# Patient Record
Sex: MALE | Race: WHITE | NOT HISPANIC OR LATINO | Employment: OTHER | URBAN - METROPOLITAN AREA
[De-identification: names, ages, dates, MRNs, and addresses within clinical notes are randomized per-mention and may not be internally consistent; named-entity substitution may affect disease eponyms.]

---

## 2018-08-29 ENCOUNTER — HOSPITAL ENCOUNTER (OUTPATIENT)
Dept: CT IMAGING | Facility: HOSPITAL | Age: 78
Discharge: HOME | End: 2018-08-29
Attending: INTERNAL MEDICINE | Admitting: INTERNAL MEDICINE

## 2018-08-29 ENCOUNTER — TRANSCRIBE ORDERS (OUTPATIENT)
Dept: ADMINISTRATIVE | Facility: HOSPITAL | Age: 78
End: 2018-08-29

## 2018-08-29 DIAGNOSIS — R41.82 ALTERED MENTAL STATUS, UNSPECIFIED ALTERED MENTAL STATUS TYPE: Primary | ICD-10-CM

## 2018-08-29 DIAGNOSIS — R41.82 ALTERED MENTAL STATUS, UNSPECIFIED ALTERED MENTAL STATUS TYPE: ICD-10-CM

## 2018-08-29 PROCEDURE — 70450 CT HEAD/BRAIN W/O DYE: CPT

## 2018-09-19 ENCOUNTER — HOSPITAL ENCOUNTER (INPATIENT)
Facility: HOSPITAL | Age: 78
LOS: 5 days | Discharge: HOME HEALTH | End: 2018-09-24
Attending: EMERGENCY MEDICINE | Admitting: INTERNAL MEDICINE

## 2018-09-19 DIAGNOSIS — G30.8 ALZHEIMER'S DISEASE OF OTHER ONSET WITH BEHAVIORAL DISTURBANCE: ICD-10-CM

## 2018-09-19 DIAGNOSIS — N30.90 CYSTITIS: Primary | ICD-10-CM

## 2018-09-19 DIAGNOSIS — F02.818 ALZHEIMER'S DISEASE OF OTHER ONSET WITH BEHAVIORAL DISTURBANCE: ICD-10-CM

## 2018-09-19 DIAGNOSIS — F03.91 DEMENTIA WITH BEHAVIORAL DISTURBANCE, UNSPECIFIED DEMENTIA TYPE: ICD-10-CM

## 2018-09-19 DIAGNOSIS — R46.89 AGGRESSIVE BEHAVIOR: ICD-10-CM

## 2018-09-19 PROBLEM — E78.5 HYPERLIPIDEMIA: Status: ACTIVE | Noted: 2018-09-19

## 2018-09-19 PROBLEM — N20.0 NEPHROLITHIASIS: Status: ACTIVE | Noted: 2018-09-19

## 2018-09-19 PROBLEM — Z91.14 NONCOMPLIANCE WITH MEDICATION REGIMEN: Status: ACTIVE | Noted: 2018-09-19

## 2018-09-19 PROBLEM — IMO0002 TYPE 2 DIABETES MELLITUS WITH NEUROLOGICAL MANIFESTATIONS, UNCONTROLLED: Status: ACTIVE | Noted: 2018-09-19

## 2018-09-19 PROBLEM — E11.65 POORLY CONTROLLED TYPE 2 DIABETES MELLITUS (HCC): Status: ACTIVE | Noted: 2018-09-19

## 2018-09-19 PROBLEM — G30.9 ALZHEIMER'S DEMENTIA WITH BEHAVIORAL DISTURBANCE (HCC): Status: ACTIVE | Noted: 2018-09-19

## 2018-09-19 PROBLEM — Z86.69 H/O PARTIAL SEIZURES: Status: ACTIVE | Noted: 2018-09-19

## 2018-09-19 PROBLEM — Z85.46 HISTORY OF PROSTATE CANCER: Status: ACTIVE | Noted: 2018-09-19

## 2018-09-19 LAB
ALBUMIN SERPL-MCNC: 4.3 G/DL (ref 3.5–5.2)
ALBUMIN UR-MCNC: 2.3 MG/L
ALBUMIN/GLOB SERPL: 1.4 G/DL
ALP SERPL-CCNC: 108 U/L (ref 39–117)
ALT SERPL W P-5'-P-CCNC: 21 U/L (ref 1–41)
AMPHET+METHAMPHET UR QL: NEGATIVE
ANION GAP SERPL CALCULATED.3IONS-SCNC: 16.3 MMOL/L
AST SERPL-CCNC: 15 U/L (ref 1–40)
BACTERIA UR QL AUTO: ABNORMAL /HPF
BARBITURATES UR QL SCN: NEGATIVE
BASOPHILS # BLD AUTO: 0.03 10*3/MM3 (ref 0–0.2)
BASOPHILS NFR BLD AUTO: 0.4 % (ref 0–1.5)
BENZODIAZ UR QL SCN: NEGATIVE
BILIRUB SERPL-MCNC: 0.5 MG/DL (ref 0.1–1.2)
BILIRUB UR QL STRIP: NEGATIVE
BUN BLD-MCNC: 10 MG/DL (ref 8–23)
BUN/CREAT SERPL: 14.5 (ref 7–25)
CALCIUM SPEC-SCNC: 9.4 MG/DL (ref 8.6–10.5)
CANNABINOIDS SERPL QL: NEGATIVE
CHLORIDE SERPL-SCNC: 98 MMOL/L (ref 98–107)
CLARITY UR: CLEAR
CO2 SERPL-SCNC: 22.7 MMOL/L (ref 22–29)
COCAINE UR QL: NEGATIVE
COLOR UR: YELLOW
CREAT BLD-MCNC: 0.69 MG/DL (ref 0.76–1.27)
CREAT UR-MCNC: 19.4 MG/DL
DEPRECATED RDW RBC AUTO: 41.8 FL (ref 37–54)
EOSINOPHIL # BLD AUTO: 0.07 10*3/MM3 (ref 0–0.7)
EOSINOPHIL NFR BLD AUTO: 0.9 % (ref 0.3–6.2)
ERYTHROCYTE [DISTWIDTH] IN BLOOD BY AUTOMATED COUNT: 12.4 % (ref 11.5–14.5)
ETHANOL BLD-MCNC: <10 MG/DL (ref 0–10)
ETHANOL UR QL: <0.01 %
FOLATE SERPL-MCNC: >20 NG/ML (ref 4.78–24.2)
GFR SERPL CREATININE-BSD FRML MDRD: 111 ML/MIN/1.73
GLOBULIN UR ELPH-MCNC: 3 GM/DL
GLUCOSE BLD-MCNC: 275 MG/DL (ref 65–99)
GLUCOSE BLDC GLUCOMTR-MCNC: 256 MG/DL (ref 70–130)
GLUCOSE BLDC GLUCOMTR-MCNC: 334 MG/DL (ref 70–130)
GLUCOSE UR STRIP-MCNC: ABNORMAL MG/DL
HBA1C MFR BLD: 16.54 % (ref 4.8–5.6)
HCT VFR BLD AUTO: 42.9 % (ref 40.4–52.2)
HGB BLD-MCNC: 14.8 G/DL (ref 13.7–17.6)
HGB UR QL STRIP.AUTO: NEGATIVE
HYALINE CASTS UR QL AUTO: ABNORMAL /LPF
IMM GRANULOCYTES # BLD: 0.03 10*3/MM3 (ref 0–0.03)
IMM GRANULOCYTES NFR BLD: 0.4 % (ref 0–0.5)
KETONES UR QL STRIP: ABNORMAL
LEUKOCYTE ESTERASE UR QL STRIP.AUTO: ABNORMAL
LYMPHOCYTES # BLD AUTO: 1.86 10*3/MM3 (ref 0.9–4.8)
LYMPHOCYTES NFR BLD AUTO: 24.1 % (ref 19.6–45.3)
MCH RBC QN AUTO: 31.7 PG (ref 27–32.7)
MCHC RBC AUTO-ENTMCNC: 34.5 G/DL (ref 32.6–36.4)
MCV RBC AUTO: 91.9 FL (ref 79.8–96.2)
METHADONE UR QL SCN: NEGATIVE
MICROALBUMIN/CREAT UR: 118.6 MG/G
MONOCYTES # BLD AUTO: 0.78 10*3/MM3 (ref 0.2–1.2)
MONOCYTES NFR BLD AUTO: 10.1 % (ref 5–12)
NEUTROPHILS # BLD AUTO: 4.98 10*3/MM3 (ref 1.9–8.1)
NEUTROPHILS NFR BLD AUTO: 64.5 % (ref 42.7–76)
NITRITE UR QL STRIP: NEGATIVE
OPIATES UR QL: NEGATIVE
OXYCODONE UR QL SCN: NEGATIVE
PH UR STRIP.AUTO: 6 [PH] (ref 5–8)
PLATELET # BLD AUTO: 265 10*3/MM3 (ref 140–500)
PMV BLD AUTO: 9.5 FL (ref 6–12)
POTASSIUM BLD-SCNC: 3.8 MMOL/L (ref 3.5–5.2)
PROT SERPL-MCNC: 7.3 G/DL (ref 6–8.5)
PROT UR QL STRIP: NEGATIVE
RBC # BLD AUTO: 4.67 10*6/MM3 (ref 4.6–6)
RBC # UR: ABNORMAL /HPF
REF LAB TEST METHOD: ABNORMAL
SODIUM BLD-SCNC: 137 MMOL/L (ref 136–145)
SP GR UR STRIP: 1.01 (ref 1–1.03)
SQUAMOUS #/AREA URNS HPF: ABNORMAL /HPF
T4 FREE SERPL-MCNC: 1.53 NG/DL (ref 0.93–1.7)
TSH SERPL DL<=0.05 MIU/L-ACNC: 1.05 MIU/ML (ref 0.27–4.2)
UROBILINOGEN UR QL STRIP: ABNORMAL
VIT B12 BLD-MCNC: 1367 PG/ML (ref 211–946)
WBC NRBC COR # BLD: 7.72 10*3/MM3 (ref 4.5–10.7)
WBC UR QL AUTO: ABNORMAL /HPF

## 2018-09-19 PROCEDURE — 84439 ASSAY OF FREE THYROXINE: CPT | Performed by: INTERNAL MEDICINE

## 2018-09-19 PROCEDURE — 80307 DRUG TEST PRSMV CHEM ANLYZR: CPT | Performed by: EMERGENCY MEDICINE

## 2018-09-19 PROCEDURE — 80053 COMPREHEN METABOLIC PANEL: CPT | Performed by: EMERGENCY MEDICINE

## 2018-09-19 PROCEDURE — 90791 PSYCH DIAGNOSTIC EVALUATION: CPT | Performed by: SOCIAL WORKER

## 2018-09-19 PROCEDURE — 87077 CULTURE AEROBIC IDENTIFY: CPT | Performed by: EMERGENCY MEDICINE

## 2018-09-19 PROCEDURE — 99284 EMERGENCY DEPT VISIT MOD MDM: CPT

## 2018-09-19 PROCEDURE — 84443 ASSAY THYROID STIM HORMONE: CPT | Performed by: INTERNAL MEDICINE

## 2018-09-19 PROCEDURE — 83036 HEMOGLOBIN GLYCOSYLATED A1C: CPT | Performed by: INTERNAL MEDICINE

## 2018-09-19 PROCEDURE — 87086 URINE CULTURE/COLONY COUNT: CPT | Performed by: EMERGENCY MEDICINE

## 2018-09-19 PROCEDURE — 87040 BLOOD CULTURE FOR BACTERIA: CPT | Performed by: EMERGENCY MEDICINE

## 2018-09-19 PROCEDURE — 82607 VITAMIN B-12: CPT | Performed by: INTERNAL MEDICINE

## 2018-09-19 PROCEDURE — 81001 URINALYSIS AUTO W/SCOPE: CPT | Performed by: EMERGENCY MEDICINE

## 2018-09-19 PROCEDURE — 63710000001 INSULIN ASPART PER 5 UNITS: Performed by: INTERNAL MEDICINE

## 2018-09-19 PROCEDURE — 99223 1ST HOSP IP/OBS HIGH 75: CPT | Performed by: INTERNAL MEDICINE

## 2018-09-19 PROCEDURE — 87186 SC STD MICRODIL/AGAR DIL: CPT | Performed by: EMERGENCY MEDICINE

## 2018-09-19 PROCEDURE — 82962 GLUCOSE BLOOD TEST: CPT

## 2018-09-19 PROCEDURE — 25010000002 CEFTRIAXONE PER 250 MG: Performed by: EMERGENCY MEDICINE

## 2018-09-19 PROCEDURE — 82043 UR ALBUMIN QUANTITATIVE: CPT | Performed by: INTERNAL MEDICINE

## 2018-09-19 PROCEDURE — 85025 COMPLETE CBC W/AUTO DIFF WBC: CPT | Performed by: EMERGENCY MEDICINE

## 2018-09-19 PROCEDURE — 82746 ASSAY OF FOLIC ACID SERUM: CPT | Performed by: INTERNAL MEDICINE

## 2018-09-19 PROCEDURE — 82570 ASSAY OF URINE CREATININE: CPT | Performed by: INTERNAL MEDICINE

## 2018-09-19 RX ORDER — GLIPIZIDE 10 MG/1
10 TABLET ORAL DAILY
COMMUNITY
End: 2018-09-24 | Stop reason: HOSPADM

## 2018-09-19 RX ORDER — LISINOPRIL 2.5 MG/1
2.5 TABLET ORAL DAILY
Status: DISCONTINUED | OUTPATIENT
Start: 2018-09-19 | End: 2018-09-24 | Stop reason: HOSPADM

## 2018-09-19 RX ORDER — SODIUM CHLORIDE 0.9 % (FLUSH) 0.9 %
1-10 SYRINGE (ML) INJECTION AS NEEDED
Status: DISCONTINUED | OUTPATIENT
Start: 2018-09-19 | End: 2018-09-24 | Stop reason: HOSPADM

## 2018-09-19 RX ORDER — LISINOPRIL 2.5 MG/1
2.5 TABLET ORAL DAILY
COMMUNITY
End: 2019-01-01

## 2018-09-19 RX ORDER — LORAZEPAM 2 MG/ML
0.5 INJECTION INTRAMUSCULAR EVERY 6 HOURS PRN
Status: DISCONTINUED | OUTPATIENT
Start: 2018-09-19 | End: 2018-09-21

## 2018-09-19 RX ORDER — NICOTINE POLACRILEX 4 MG
15 LOZENGE BUCCAL
Status: DISCONTINUED | OUTPATIENT
Start: 2018-09-19 | End: 2018-09-24 | Stop reason: HOSPADM

## 2018-09-19 RX ORDER — CEFTRIAXONE SODIUM 1 G/50ML
1 INJECTION, SOLUTION INTRAVENOUS ONCE
Status: COMPLETED | OUTPATIENT
Start: 2018-09-19 | End: 2018-09-19

## 2018-09-19 RX ORDER — ACETAMINOPHEN 325 MG/1
650 TABLET ORAL EVERY 4 HOURS PRN
Status: DISCONTINUED | OUTPATIENT
Start: 2018-09-19 | End: 2018-09-24 | Stop reason: HOSPADM

## 2018-09-19 RX ORDER — DEXTROSE MONOHYDRATE 25 G/50ML
25 INJECTION, SOLUTION INTRAVENOUS
Status: DISCONTINUED | OUTPATIENT
Start: 2018-09-19 | End: 2018-09-24 | Stop reason: HOSPADM

## 2018-09-19 RX ORDER — SODIUM CHLORIDE 0.9 % (FLUSH) 0.9 %
10 SYRINGE (ML) INJECTION AS NEEDED
Status: DISCONTINUED | OUTPATIENT
Start: 2018-09-19 | End: 2018-09-24 | Stop reason: HOSPADM

## 2018-09-19 RX ORDER — GLIPIZIDE 10 MG/1
10 TABLET ORAL DAILY
Status: DISCONTINUED | OUTPATIENT
Start: 2018-09-19 | End: 2018-09-19

## 2018-09-19 RX ADMIN — METFORMIN HYDROCHLORIDE 1000 MG: 1000 TABLET ORAL at 19:10

## 2018-09-19 RX ADMIN — LINAGLIPTIN 5 MG: 5 TABLET, FILM COATED ORAL at 19:11

## 2018-09-19 RX ADMIN — INSULIN ASPART 5 UNITS: 100 INJECTION, SOLUTION INTRAVENOUS; SUBCUTANEOUS at 17:50

## 2018-09-19 RX ADMIN — CEFTRIAXONE SODIUM 1 G: 1 INJECTION, SOLUTION INTRAVENOUS at 14:44

## 2018-09-19 RX ADMIN — INSULIN ASPART 4 UNITS: 100 INJECTION, SOLUTION INTRAVENOUS; SUBCUTANEOUS at 21:50

## 2018-09-19 RX ADMIN — LISINOPRIL 2.5 MG: 2.5 TABLET ORAL at 19:11

## 2018-09-20 LAB
GLUCOSE BLDC GLUCOMTR-MCNC: 136 MG/DL (ref 70–130)
GLUCOSE BLDC GLUCOMTR-MCNC: 166 MG/DL (ref 70–130)
GLUCOSE BLDC GLUCOMTR-MCNC: 244 MG/DL (ref 70–130)
GLUCOSE BLDC GLUCOMTR-MCNC: 284 MG/DL (ref 70–130)
GLUCOSE BLDC GLUCOMTR-MCNC: 366 MG/DL (ref 70–130)
GLUCOSE BLDC GLUCOMTR-MCNC: 413 MG/DL (ref 70–130)

## 2018-09-20 PROCEDURE — 63710000001 INSULIN DETEMIR PER 5 UNITS: Performed by: INTERNAL MEDICINE

## 2018-09-20 PROCEDURE — 63710000001 INSULIN ASPART PER 5 UNITS: Performed by: INTERNAL MEDICINE

## 2018-09-20 PROCEDURE — 99232 SBSQ HOSP IP/OBS MODERATE 35: CPT | Performed by: INTERNAL MEDICINE

## 2018-09-20 PROCEDURE — 82962 GLUCOSE BLOOD TEST: CPT

## 2018-09-20 PROCEDURE — 25010000002 CEFTRIAXONE PER 250 MG: Performed by: HOSPITALIST

## 2018-09-20 PROCEDURE — 25010000002 LORAZEPAM PER 2 MG: Performed by: INTERNAL MEDICINE

## 2018-09-20 RX ORDER — QUETIAPINE FUMARATE 25 MG/1
25 TABLET, FILM COATED ORAL NIGHTLY
Status: DISCONTINUED | OUTPATIENT
Start: 2018-09-21 | End: 2018-09-24 | Stop reason: HOSPADM

## 2018-09-20 RX ORDER — CEFTRIAXONE SODIUM 1 G/50ML
1 INJECTION, SOLUTION INTRAVENOUS EVERY 24 HOURS
Status: COMPLETED | OUTPATIENT
Start: 2018-09-20 | End: 2018-09-22

## 2018-09-20 RX ADMIN — INSULIN ASPART 5 UNITS: 100 INJECTION, SOLUTION INTRAVENOUS; SUBCUTANEOUS at 13:16

## 2018-09-20 RX ADMIN — INSULIN DETEMIR 20 UNITS: 100 INJECTION, SOLUTION SUBCUTANEOUS at 13:16

## 2018-09-20 RX ADMIN — INSULIN ASPART 3 UNITS: 100 INJECTION, SOLUTION INTRAVENOUS; SUBCUTANEOUS at 08:51

## 2018-09-20 RX ADMIN — LINAGLIPTIN 5 MG: 5 TABLET, FILM COATED ORAL at 08:51

## 2018-09-20 RX ADMIN — LISINOPRIL 2.5 MG: 2.5 TABLET ORAL at 08:51

## 2018-09-20 RX ADMIN — LORAZEPAM 0.5 MG: 2 INJECTION INTRAMUSCULAR; INTRAVENOUS at 00:14

## 2018-09-20 RX ADMIN — INSULIN ASPART 5 UNITS: 100 INJECTION, SOLUTION INTRAVENOUS; SUBCUTANEOUS at 13:18

## 2018-09-20 RX ADMIN — METFORMIN HYDROCHLORIDE 1000 MG: 1000 TABLET ORAL at 08:51

## 2018-09-20 RX ADMIN — LORAZEPAM 0.5 MG: 2 INJECTION INTRAMUSCULAR; INTRAVENOUS at 06:17

## 2018-09-20 RX ADMIN — INSULIN ASPART 5 UNITS: 100 INJECTION, SOLUTION INTRAVENOUS; SUBCUTANEOUS at 18:14

## 2018-09-20 RX ADMIN — CEFTRIAXONE SODIUM 1 G: 1 INJECTION, SOLUTION INTRAVENOUS at 16:20

## 2018-09-20 RX ADMIN — LORAZEPAM 0.5 MG: 2 INJECTION INTRAMUSCULAR; INTRAVENOUS at 21:16

## 2018-09-20 RX ADMIN — INSULIN ASPART 2 UNITS: 100 INJECTION, SOLUTION INTRAVENOUS; SUBCUTANEOUS at 18:14

## 2018-09-21 LAB
BACTERIA SPEC AEROBE CULT: ABNORMAL
GLUCOSE BLDC GLUCOMTR-MCNC: 112 MG/DL (ref 70–130)
GLUCOSE BLDC GLUCOMTR-MCNC: 146 MG/DL (ref 70–130)
GLUCOSE BLDC GLUCOMTR-MCNC: 160 MG/DL (ref 70–130)
GLUCOSE BLDC GLUCOMTR-MCNC: 250 MG/DL (ref 70–130)

## 2018-09-21 PROCEDURE — 25010000002 CEFTRIAXONE PER 250 MG: Performed by: HOSPITALIST

## 2018-09-21 PROCEDURE — 99232 SBSQ HOSP IP/OBS MODERATE 35: CPT | Performed by: INTERNAL MEDICINE

## 2018-09-21 PROCEDURE — 82962 GLUCOSE BLOOD TEST: CPT

## 2018-09-21 PROCEDURE — 63710000001 INSULIN DETEMIR PER 5 UNITS: Performed by: INTERNAL MEDICINE

## 2018-09-21 PROCEDURE — 63710000001 INSULIN ASPART PER 5 UNITS: Performed by: INTERNAL MEDICINE

## 2018-09-21 RX ORDER — OLANZAPINE 10 MG/1
7.5 INJECTION, POWDER, LYOPHILIZED, FOR SOLUTION INTRAMUSCULAR EVERY 6 HOURS PRN
Status: COMPLETED | OUTPATIENT
Start: 2018-09-21 | End: 2018-09-23

## 2018-09-21 RX ADMIN — INSULIN ASPART 4 UNITS: 100 INJECTION, SOLUTION INTRAVENOUS; SUBCUTANEOUS at 21:07

## 2018-09-21 RX ADMIN — QUETIAPINE FUMARATE 25 MG: 25 TABLET ORAL at 21:07

## 2018-09-21 RX ADMIN — INSULIN ASPART 2 UNITS: 100 INJECTION, SOLUTION INTRAVENOUS; SUBCUTANEOUS at 08:05

## 2018-09-21 RX ADMIN — INSULIN DETEMIR 20 UNITS: 100 INJECTION, SOLUTION SUBCUTANEOUS at 07:27

## 2018-09-21 RX ADMIN — INSULIN ASPART 5 UNITS: 100 INJECTION, SOLUTION INTRAVENOUS; SUBCUTANEOUS at 17:20

## 2018-09-21 RX ADMIN — INSULIN ASPART 5 UNITS: 100 INJECTION, SOLUTION INTRAVENOUS; SUBCUTANEOUS at 08:05

## 2018-09-21 RX ADMIN — LISINOPRIL 2.5 MG: 2.5 TABLET ORAL at 08:05

## 2018-09-21 RX ADMIN — QUETIAPINE FUMARATE 25 MG: 25 TABLET ORAL at 00:00

## 2018-09-21 RX ADMIN — CEFTRIAXONE SODIUM 1 G: 1 INJECTION, SOLUTION INTRAVENOUS at 16:14

## 2018-09-22 LAB
GLUCOSE BLDC GLUCOMTR-MCNC: 158 MG/DL (ref 70–130)
GLUCOSE BLDC GLUCOMTR-MCNC: 177 MG/DL (ref 70–130)
GLUCOSE BLDC GLUCOMTR-MCNC: 279 MG/DL (ref 70–130)
GLUCOSE BLDC GLUCOMTR-MCNC: 91 MG/DL (ref 70–130)

## 2018-09-22 PROCEDURE — 63710000001 INSULIN ASPART PER 5 UNITS: Performed by: INTERNAL MEDICINE

## 2018-09-22 PROCEDURE — 82962 GLUCOSE BLOOD TEST: CPT

## 2018-09-22 PROCEDURE — 99232 SBSQ HOSP IP/OBS MODERATE 35: CPT | Performed by: INTERNAL MEDICINE

## 2018-09-22 PROCEDURE — 25010000002 CEFTRIAXONE PER 250 MG: Performed by: HOSPITALIST

## 2018-09-22 PROCEDURE — 63710000001 INSULIN DETEMIR PER 5 UNITS: Performed by: INTERNAL MEDICINE

## 2018-09-22 RX ADMIN — QUETIAPINE FUMARATE 25 MG: 25 TABLET ORAL at 20:57

## 2018-09-22 RX ADMIN — LISINOPRIL 2.5 MG: 2.5 TABLET ORAL at 09:11

## 2018-09-22 RX ADMIN — INSULIN ASPART 4 UNITS: 100 INJECTION, SOLUTION INTRAVENOUS; SUBCUTANEOUS at 12:32

## 2018-09-22 RX ADMIN — INSULIN ASPART 5 UNITS: 100 INJECTION, SOLUTION INTRAVENOUS; SUBCUTANEOUS at 09:12

## 2018-09-22 RX ADMIN — CEFTRIAXONE SODIUM 1 G: 1 INJECTION, SOLUTION INTRAVENOUS at 15:56

## 2018-09-22 RX ADMIN — INSULIN ASPART 2 UNITS: 100 INJECTION, SOLUTION INTRAVENOUS; SUBCUTANEOUS at 09:11

## 2018-09-22 RX ADMIN — INSULIN ASPART 5 UNITS: 100 INJECTION, SOLUTION INTRAVENOUS; SUBCUTANEOUS at 12:31

## 2018-09-22 RX ADMIN — INSULIN DETEMIR 20 UNITS: 100 INJECTION, SOLUTION SUBCUTANEOUS at 09:12

## 2018-09-23 LAB
GLUCOSE BLDC GLUCOMTR-MCNC: 163 MG/DL (ref 70–130)
GLUCOSE BLDC GLUCOMTR-MCNC: 203 MG/DL (ref 70–130)
GLUCOSE BLDC GLUCOMTR-MCNC: 234 MG/DL (ref 70–130)
GLUCOSE BLDC GLUCOMTR-MCNC: 74 MG/DL (ref 70–130)

## 2018-09-23 PROCEDURE — 82962 GLUCOSE BLOOD TEST: CPT

## 2018-09-23 PROCEDURE — 63710000001 INSULIN ASPART PER 5 UNITS: Performed by: INTERNAL MEDICINE

## 2018-09-23 PROCEDURE — 63710000001 INSULIN DETEMIR PER 5 UNITS: Performed by: INTERNAL MEDICINE

## 2018-09-23 PROCEDURE — 99232 SBSQ HOSP IP/OBS MODERATE 35: CPT | Performed by: INTERNAL MEDICINE

## 2018-09-23 RX ADMIN — INSULIN ASPART 5 UNITS: 100 INJECTION, SOLUTION INTRAVENOUS; SUBCUTANEOUS at 12:42

## 2018-09-23 RX ADMIN — QUETIAPINE FUMARATE 25 MG: 25 TABLET ORAL at 20:32

## 2018-09-23 RX ADMIN — INSULIN ASPART 3 UNITS: 100 INJECTION, SOLUTION INTRAVENOUS; SUBCUTANEOUS at 08:53

## 2018-09-23 RX ADMIN — LISINOPRIL 2.5 MG: 2.5 TABLET ORAL at 08:53

## 2018-09-23 RX ADMIN — OLANZAPINE 7.5 MG: 10 INJECTION, POWDER, FOR SOLUTION INTRAMUSCULAR at 23:46

## 2018-09-23 RX ADMIN — INSULIN ASPART 8 UNITS: 100 INJECTION, SOLUTION INTRAVENOUS; SUBCUTANEOUS at 17:30

## 2018-09-23 RX ADMIN — INSULIN ASPART 2 UNITS: 100 INJECTION, SOLUTION INTRAVENOUS; SUBCUTANEOUS at 17:29

## 2018-09-23 RX ADMIN — INSULIN DETEMIR 20 UNITS: 100 INJECTION, SOLUTION SUBCUTANEOUS at 08:53

## 2018-09-23 RX ADMIN — INSULIN ASPART 3 UNITS: 100 INJECTION, SOLUTION INTRAVENOUS; SUBCUTANEOUS at 12:43

## 2018-09-23 RX ADMIN — INSULIN ASPART 5 UNITS: 100 INJECTION, SOLUTION INTRAVENOUS; SUBCUTANEOUS at 08:00

## 2018-09-24 VITALS
SYSTOLIC BLOOD PRESSURE: 127 MMHG | RESPIRATION RATE: 16 BRPM | DIASTOLIC BLOOD PRESSURE: 74 MMHG | BODY MASS INDEX: 20.14 KG/M2 | WEIGHT: 136 LBS | HEIGHT: 69 IN | HEART RATE: 98 BPM | OXYGEN SATURATION: 99 % | TEMPERATURE: 97.4 F

## 2018-09-24 PROBLEM — R80.9 PROTEINURIA: Status: ACTIVE | Noted: 2018-09-24

## 2018-09-24 LAB
BACTERIA SPEC AEROBE CULT: NORMAL
BACTERIA SPEC AEROBE CULT: NORMAL
GLUCOSE BLDC GLUCOMTR-MCNC: 106 MG/DL (ref 70–130)
GLUCOSE BLDC GLUCOMTR-MCNC: 152 MG/DL (ref 70–130)
GLUCOSE BLDC GLUCOMTR-MCNC: 164 MG/DL (ref 70–130)

## 2018-09-24 PROCEDURE — 82962 GLUCOSE BLOOD TEST: CPT

## 2018-09-24 PROCEDURE — 63710000001 INSULIN ASPART PER 5 UNITS: Performed by: INTERNAL MEDICINE

## 2018-09-24 PROCEDURE — 99232 SBSQ HOSP IP/OBS MODERATE 35: CPT | Performed by: INTERNAL MEDICINE

## 2018-09-24 PROCEDURE — 63710000001 INSULIN DETEMIR PER 5 UNITS: Performed by: INTERNAL MEDICINE

## 2018-09-24 RX ORDER — QUETIAPINE FUMARATE 25 MG/1
25 TABLET, FILM COATED ORAL NIGHTLY
Qty: 30 TABLET | Refills: 0 | Status: SHIPPED | OUTPATIENT
Start: 2018-09-24 | End: 2018-10-24

## 2018-09-24 RX ADMIN — LISINOPRIL 2.5 MG: 2.5 TABLET ORAL at 08:25

## 2018-09-24 RX ADMIN — INSULIN ASPART 8 UNITS: 100 INJECTION, SOLUTION INTRAVENOUS; SUBCUTANEOUS at 08:23

## 2018-09-24 RX ADMIN — INSULIN ASPART 8 UNITS: 100 INJECTION, SOLUTION INTRAVENOUS; SUBCUTANEOUS at 13:19

## 2018-09-24 RX ADMIN — INSULIN DETEMIR 25 UNITS: 100 INJECTION, SOLUTION SUBCUTANEOUS at 08:23

## 2018-09-24 RX ADMIN — INSULIN ASPART 2 UNITS: 100 INJECTION, SOLUTION INTRAVENOUS; SUBCUTANEOUS at 13:20

## 2018-09-25 ENCOUNTER — READMISSION MANAGEMENT (OUTPATIENT)
Dept: CALL CENTER | Facility: HOSPITAL | Age: 78
End: 2018-09-25

## 2018-09-25 NOTE — OUTREACH NOTE
Prep Survey      Responses   Facility patient discharged from?  [facility]   Is patient eligible?  Yes   Discharge diagnosis  **Cystitis    Does the patient have one of the following disease processes/diagnoses(primary or secondary)?  Other   Does the patient have Home health ordered?  Yes   What is the Home health agency?   [facility]   Is there a DME ordered?  No   Prep survey completed?  Yes          Christen Powers RN

## 2018-09-27 ENCOUNTER — READMISSION MANAGEMENT (OUTPATIENT)
Dept: CALL CENTER | Facility: HOSPITAL | Age: 78
End: 2018-09-27

## 2018-10-01 ENCOUNTER — READMISSION MANAGEMENT (OUTPATIENT)
Dept: CALL CENTER | Facility: HOSPITAL | Age: 78
End: 2018-10-01

## 2018-10-01 NOTE — OUTREACH NOTE
Medical Week 1 Survey      Responses   Facility patient discharged from?  [facility]   Does the patient have one of the following disease processes/diagnoses(primary or secondary)?  Other   Is there a successful TCM telephone encounter documented?  No   Week 1 attempt successful?  No   Unsuccessful attempts  Attempt 2   Revoke  Phone number issues          Liya Betancourt, RN

## 2019-01-01 ENCOUNTER — APPOINTMENT (OUTPATIENT)
Dept: GENERAL RADIOLOGY | Facility: HOSPITAL | Age: 79
End: 2019-01-01

## 2019-01-01 ENCOUNTER — HOSPITAL ENCOUNTER (INPATIENT)
Facility: HOSPITAL | Age: 79
LOS: 13 days | Discharge: NURSING, REHAB OR LONG-TERM CARE | End: 2019-12-14
Attending: EMERGENCY MEDICINE | Admitting: INTERNAL MEDICINE

## 2019-01-01 ENCOUNTER — HOSPITAL ENCOUNTER (INPATIENT)
Facility: HOSPITAL | Age: 79
LOS: 65 days | End: 2020-02-28
Attending: SPECIALIST | Admitting: SPECIALIST

## 2019-01-01 ENCOUNTER — HOSPITAL ENCOUNTER (OUTPATIENT)
Facility: HOSPITAL | Age: 79
Setting detail: OBSERVATION
End: 2019-12-25
Attending: EMERGENCY MEDICINE | Admitting: INTERNAL MEDICINE

## 2019-01-01 VITALS
OXYGEN SATURATION: 97 % | RESPIRATION RATE: 18 BRPM | HEIGHT: 72 IN | BODY MASS INDEX: 22.28 KG/M2 | WEIGHT: 164.46 LBS | HEART RATE: 76 BPM | DIASTOLIC BLOOD PRESSURE: 77 MMHG | SYSTOLIC BLOOD PRESSURE: 123 MMHG | TEMPERATURE: 99.2 F

## 2019-01-01 VITALS
WEIGHT: 132.28 LBS | OXYGEN SATURATION: 98 % | RESPIRATION RATE: 16 BRPM | HEIGHT: 72 IN | SYSTOLIC BLOOD PRESSURE: 127 MMHG | BODY MASS INDEX: 17.92 KG/M2 | HEART RATE: 65 BPM | TEMPERATURE: 97.2 F | DIASTOLIC BLOOD PRESSURE: 69 MMHG

## 2019-01-01 DIAGNOSIS — F02.81 ALZHEIMER'S DEMENTIA WITH BEHAVIORAL DISTURBANCE, UNSPECIFIED TIMING OF DEMENTIA ONSET: ICD-10-CM

## 2019-01-01 DIAGNOSIS — F03.91 DEMENTIA WITH BEHAVIORAL DISTURBANCE, UNSPECIFIED DEMENTIA TYPE: Primary | ICD-10-CM

## 2019-01-01 DIAGNOSIS — A41.9 SEPSIS WITHOUT ACUTE ORGAN DYSFUNCTION, DUE TO UNSPECIFIED ORGANISM (HCC): ICD-10-CM

## 2019-01-01 DIAGNOSIS — R22.2 MASS OF LEFT CHEST WALL: ICD-10-CM

## 2019-01-01 DIAGNOSIS — N39.0 ACUTE UTI (URINARY TRACT INFECTION): ICD-10-CM

## 2019-01-01 DIAGNOSIS — J18.9 PNEUMONIA OF RIGHT LOWER LOBE DUE TO INFECTIOUS ORGANISM: Primary | ICD-10-CM

## 2019-01-01 DIAGNOSIS — Z74.09 IMPAIRED MOBILITY: Primary | ICD-10-CM

## 2019-01-01 DIAGNOSIS — F05 DELIRIUM DUE TO ANOTHER MEDICAL CONDITION: ICD-10-CM

## 2019-01-01 DIAGNOSIS — G30.9 ALZHEIMER'S DEMENTIA WITH BEHAVIORAL DISTURBANCE, UNSPECIFIED TIMING OF DEMENTIA ONSET: ICD-10-CM

## 2019-01-01 LAB
ALBUMIN SERPL-MCNC: 3.1 G/DL (ref 3.5–5.2)
ALBUMIN SERPL-MCNC: 3.2 G/DL (ref 3.5–5.2)
ALBUMIN SERPL-MCNC: 3.5 G/DL (ref 3.5–5.2)
ALBUMIN/GLOB SERPL: 0.8 G/DL
ALBUMIN/GLOB SERPL: 0.9 G/DL
ALBUMIN/GLOB SERPL: 0.9 G/DL
ALP SERPL-CCNC: 124 U/L (ref 39–117)
ALP SERPL-CCNC: 140 U/L (ref 39–117)
ALP SERPL-CCNC: 86 U/L (ref 39–117)
ALT SERPL W P-5'-P-CCNC: 10 U/L (ref 1–41)
ALT SERPL W P-5'-P-CCNC: 13 U/L (ref 1–41)
ALT SERPL W P-5'-P-CCNC: 18 U/L (ref 1–41)
ANION GAP SERPL CALCULATED.3IONS-SCNC: 10 MMOL/L (ref 5–15)
ANION GAP SERPL CALCULATED.3IONS-SCNC: 10 MMOL/L (ref 5–15)
ANION GAP SERPL CALCULATED.3IONS-SCNC: 10.8 MMOL/L (ref 5–15)
ANION GAP SERPL CALCULATED.3IONS-SCNC: 11 MMOL/L (ref 5–15)
ANION GAP SERPL CALCULATED.3IONS-SCNC: 11.1 MMOL/L (ref 5–15)
ANION GAP SERPL CALCULATED.3IONS-SCNC: 12.2 MMOL/L (ref 5–15)
ANION GAP SERPL CALCULATED.3IONS-SCNC: 12.6 MMOL/L (ref 5–15)
ANION GAP SERPL CALCULATED.3IONS-SCNC: 8 MMOL/L (ref 5–15)
ANION GAP SERPL CALCULATED.3IONS-SCNC: 9 MMOL/L (ref 5–15)
AST SERPL-CCNC: 14 U/L (ref 1–40)
AST SERPL-CCNC: 19 U/L (ref 1–40)
AST SERPL-CCNC: 21 U/L (ref 1–40)
B PERT DNA SPEC QL NAA+PROBE: NOT DETECTED
BACTERIA SPEC AEROBE CULT: ABNORMAL
BACTERIA SPEC AEROBE CULT: NORMAL
BACTERIA UR QL AUTO: ABNORMAL /HPF
BACTERIA UR QL AUTO: ABNORMAL /HPF
BASOPHILS # BLD AUTO: 0 10*3/MM3 (ref 0–0.2)
BASOPHILS # BLD AUTO: 0.06 10*3/MM3 (ref 0–0.2)
BASOPHILS # BLD AUTO: 0.1 10*3/MM3 (ref 0–0.2)
BASOPHILS # BLD AUTO: 0.1 10*3/MM3 (ref 0–0.2)
BASOPHILS NFR BLD AUTO: 0.3 % (ref 0–1.5)
BASOPHILS NFR BLD AUTO: 0.4 % (ref 0–1.5)
BASOPHILS NFR BLD AUTO: 0.5 % (ref 0–1.5)
BASOPHILS NFR BLD AUTO: 0.7 % (ref 0–1.5)
BILIRUB SERPL-MCNC: 0.2 MG/DL (ref 0.2–1.2)
BILIRUB UR QL STRIP: NEGATIVE
BILIRUB UR QL STRIP: NEGATIVE
BUN BLD-MCNC: 10 MG/DL (ref 8–23)
BUN BLD-MCNC: 11 MG/DL (ref 8–23)
BUN BLD-MCNC: 12 MG/DL (ref 8–23)
BUN BLD-MCNC: 14 MG/DL (ref 8–23)
BUN BLD-MCNC: 15 MG/DL (ref 8–23)
BUN BLD-MCNC: 16 MG/DL (ref 8–23)
BUN BLD-MCNC: 17 MG/DL (ref 8–23)
BUN BLD-MCNC: 18 MG/DL (ref 8–23)
BUN BLD-MCNC: 18 MG/DL (ref 8–23)
BUN/CREAT SERPL: 14.7 (ref 7–25)
BUN/CREAT SERPL: 17 (ref 7–25)
BUN/CREAT SERPL: 17.1 (ref 7–25)
BUN/CREAT SERPL: 17.7 (ref 7–25)
BUN/CREAT SERPL: 18.8 (ref 7–25)
BUN/CREAT SERPL: 18.9 (ref 7–25)
BUN/CREAT SERPL: 20.2 (ref 7–25)
BUN/CREAT SERPL: 20.3 (ref 7–25)
BUN/CREAT SERPL: 21.1 (ref 7–25)
BUN/CREAT SERPL: 21.2 (ref 7–25)
BUN/CREAT SERPL: 21.4 (ref 7–25)
C PNEUM DNA NPH QL NAA+NON-PROBE: NOT DETECTED
CALCIUM SPEC-SCNC: 7.8 MG/DL (ref 8.6–10.5)
CALCIUM SPEC-SCNC: 8.1 MG/DL (ref 8.6–10.5)
CALCIUM SPEC-SCNC: 8.2 MG/DL (ref 8.6–10.5)
CALCIUM SPEC-SCNC: 8.4 MG/DL (ref 8.6–10.5)
CALCIUM SPEC-SCNC: 8.4 MG/DL (ref 8.6–10.5)
CALCIUM SPEC-SCNC: 8.5 MG/DL (ref 8.6–10.5)
CALCIUM SPEC-SCNC: 8.5 MG/DL (ref 8.6–10.5)
CALCIUM SPEC-SCNC: 8.6 MG/DL (ref 8.6–10.5)
CALCIUM SPEC-SCNC: 8.7 MG/DL (ref 8.6–10.5)
CALCIUM SPEC-SCNC: 9 MG/DL (ref 8.6–10.5)
CALCIUM SPEC-SCNC: 9.2 MG/DL (ref 8.6–10.5)
CHLORIDE SERPL-SCNC: 100 MMOL/L (ref 98–107)
CHLORIDE SERPL-SCNC: 101 MMOL/L (ref 98–107)
CHLORIDE SERPL-SCNC: 102 MMOL/L (ref 98–107)
CHLORIDE SERPL-SCNC: 103 MMOL/L (ref 98–107)
CHLORIDE SERPL-SCNC: 97 MMOL/L (ref 98–107)
CHLORIDE SERPL-SCNC: 98 MMOL/L (ref 98–107)
CHOLEST SERPL-MCNC: 143 MG/DL (ref 0–200)
CLARITY UR: ABNORMAL
CLARITY UR: CLEAR
CO2 SERPL-SCNC: 24.4 MMOL/L (ref 22–29)
CO2 SERPL-SCNC: 27 MMOL/L (ref 22–29)
CO2 SERPL-SCNC: 27.2 MMOL/L (ref 22–29)
CO2 SERPL-SCNC: 27.8 MMOL/L (ref 22–29)
CO2 SERPL-SCNC: 27.9 MMOL/L (ref 22–29)
CO2 SERPL-SCNC: 28 MMOL/L (ref 22–29)
CO2 SERPL-SCNC: 28 MMOL/L (ref 22–29)
CO2 SERPL-SCNC: 29 MMOL/L (ref 22–29)
CO2 SERPL-SCNC: 30 MMOL/L (ref 22–29)
COLOR UR: ABNORMAL
COLOR UR: YELLOW
CREAT BLD-MCNC: 0.62 MG/DL (ref 0.76–1.27)
CREAT BLD-MCNC: 0.68 MG/DL (ref 0.76–1.27)
CREAT BLD-MCNC: 0.7 MG/DL (ref 0.76–1.27)
CREAT BLD-MCNC: 0.7 MG/DL (ref 0.76–1.27)
CREAT BLD-MCNC: 0.74 MG/DL (ref 0.76–1.27)
CREAT BLD-MCNC: 0.74 MG/DL (ref 0.76–1.27)
CREAT BLD-MCNC: 0.76 MG/DL (ref 0.76–1.27)
CREAT BLD-MCNC: 0.84 MG/DL (ref 0.76–1.27)
CREAT BLD-MCNC: 0.85 MG/DL (ref 0.76–1.27)
CREAT BLD-MCNC: 0.88 MG/DL (ref 0.76–1.27)
CREAT BLD-MCNC: 0.96 MG/DL (ref 0.76–1.27)
D-LACTATE SERPL-SCNC: 0.8 MMOL/L (ref 0.5–2)
D-LACTATE SERPL-SCNC: 1.1 MMOL/L (ref 0.5–2)
D-LACTATE SERPL-SCNC: 2 MMOL/L (ref 0.5–2)
D-LACTATE SERPL-SCNC: 2.2 MMOL/L (ref 0.5–2)
D-LACTATE SERPL-SCNC: 2.3 MMOL/L (ref 0.5–2)
DEPRECATED RDW RBC AUTO: 44.9 FL (ref 37–54)
DEPRECATED RDW RBC AUTO: 45.5 FL (ref 37–54)
DEPRECATED RDW RBC AUTO: 45.8 FL (ref 37–54)
DEPRECATED RDW RBC AUTO: 46.3 FL (ref 37–54)
DEPRECATED RDW RBC AUTO: 50.8 FL (ref 37–54)
DEPRECATED RDW RBC AUTO: 51.2 FL (ref 37–54)
DEPRECATED RDW RBC AUTO: 51.2 FL (ref 37–54)
DEPRECATED RDW RBC AUTO: 52.1 FL (ref 37–54)
DEPRECATED RDW RBC AUTO: 52.5 FL (ref 37–54)
DEPRECATED RDW RBC AUTO: 52.5 FL (ref 37–54)
DEPRECATED RDW RBC AUTO: 52.9 FL (ref 37–54)
DEPRECATED RDW RBC AUTO: 53.4 FL (ref 37–54)
EOSINOPHIL # BLD AUTO: 0 10*3/MM3 (ref 0–0.4)
EOSINOPHIL # BLD AUTO: 0.1 10*3/MM3 (ref 0–0.4)
EOSINOPHIL # BLD AUTO: 0.15 10*3/MM3 (ref 0–0.4)
EOSINOPHIL # BLD AUTO: 0.3 10*3/MM3 (ref 0–0.4)
EOSINOPHIL NFR BLD AUTO: 0.2 % (ref 0.3–6.2)
EOSINOPHIL NFR BLD AUTO: 0.2 % (ref 0.3–6.2)
EOSINOPHIL NFR BLD AUTO: 0.3 % (ref 0.3–6.2)
EOSINOPHIL NFR BLD AUTO: 0.3 % (ref 0.3–6.2)
EOSINOPHIL NFR BLD AUTO: 0.7 % (ref 0.3–6.2)
EOSINOPHIL NFR BLD AUTO: 1.2 % (ref 0.3–6.2)
EOSINOPHIL NFR BLD AUTO: 1.4 % (ref 0.3–6.2)
EOSINOPHIL NFR BLD AUTO: 2.3 % (ref 0.3–6.2)
EOSINOPHIL NFR BLD AUTO: 3.4 % (ref 0.3–6.2)
ERYTHROCYTE [DISTWIDTH] IN BLOOD BY AUTOMATED COUNT: 13.5 % (ref 12.3–15.4)
ERYTHROCYTE [DISTWIDTH] IN BLOOD BY AUTOMATED COUNT: 13.9 % (ref 12.3–15.4)
ERYTHROCYTE [DISTWIDTH] IN BLOOD BY AUTOMATED COUNT: 14 % (ref 12.3–15.4)
ERYTHROCYTE [DISTWIDTH] IN BLOOD BY AUTOMATED COUNT: 14 % (ref 12.3–15.4)
ERYTHROCYTE [DISTWIDTH] IN BLOOD BY AUTOMATED COUNT: 15.4 % (ref 12.3–15.4)
ERYTHROCYTE [DISTWIDTH] IN BLOOD BY AUTOMATED COUNT: 15.5 % (ref 12.3–15.4)
ERYTHROCYTE [DISTWIDTH] IN BLOOD BY AUTOMATED COUNT: 15.6 % (ref 12.3–15.4)
ERYTHROCYTE [DISTWIDTH] IN BLOOD BY AUTOMATED COUNT: 15.7 % (ref 12.3–15.4)
ERYTHROCYTE [DISTWIDTH] IN BLOOD BY AUTOMATED COUNT: 15.8 % (ref 12.3–15.4)
ERYTHROCYTE [DISTWIDTH] IN BLOOD BY AUTOMATED COUNT: 15.9 % (ref 12.3–15.4)
ERYTHROCYTE [DISTWIDTH] IN BLOOD BY AUTOMATED COUNT: 16.1 % (ref 12.3–15.4)
ERYTHROCYTE [DISTWIDTH] IN BLOOD BY AUTOMATED COUNT: 16.1 % (ref 12.3–15.4)
FLUAV H1 2009 PAND RNA NPH QL NAA+PROBE: NOT DETECTED
FLUAV H1 HA GENE NPH QL NAA+PROBE: NOT DETECTED
FLUAV H3 RNA NPH QL NAA+PROBE: NOT DETECTED
FLUAV SUBTYP SPEC NAA+PROBE: NOT DETECTED
FLUBV RNA ISLT QL NAA+PROBE: NOT DETECTED
GFR SERPL CREATININE-BSD FRML MDRD: 102 ML/MIN/1.73
GFR SERPL CREATININE-BSD FRML MDRD: 102 ML/MIN/1.73
GFR SERPL CREATININE-BSD FRML MDRD: 109 ML/MIN/1.73
GFR SERPL CREATININE-BSD FRML MDRD: 109 ML/MIN/1.73
GFR SERPL CREATININE-BSD FRML MDRD: 112 ML/MIN/1.73
GFR SERPL CREATININE-BSD FRML MDRD: 125 ML/MIN/1.73
GFR SERPL CREATININE-BSD FRML MDRD: 75 ML/MIN/1.73
GFR SERPL CREATININE-BSD FRML MDRD: 83 ML/MIN/1.73
GFR SERPL CREATININE-BSD FRML MDRD: 87 ML/MIN/1.73
GFR SERPL CREATININE-BSD FRML MDRD: 88 ML/MIN/1.73
GFR SERPL CREATININE-BSD FRML MDRD: 99 ML/MIN/1.73
GLOBULIN UR ELPH-MCNC: 3.5 GM/DL
GLOBULIN UR ELPH-MCNC: 3.7 GM/DL
GLOBULIN UR ELPH-MCNC: 4 GM/DL
GLUCOSE BLD-MCNC: 115 MG/DL (ref 65–99)
GLUCOSE BLD-MCNC: 126 MG/DL (ref 65–99)
GLUCOSE BLD-MCNC: 130 MG/DL (ref 65–99)
GLUCOSE BLD-MCNC: 150 MG/DL (ref 65–99)
GLUCOSE BLD-MCNC: 221 MG/DL (ref 65–99)
GLUCOSE BLD-MCNC: 230 MG/DL (ref 65–99)
GLUCOSE BLD-MCNC: 272 MG/DL (ref 65–99)
GLUCOSE BLD-MCNC: 394 MG/DL (ref 65–99)
GLUCOSE BLD-MCNC: 47 MG/DL (ref 65–99)
GLUCOSE BLD-MCNC: 55 MG/DL (ref 65–99)
GLUCOSE BLD-MCNC: 82 MG/DL (ref 65–99)
GLUCOSE BLDC GLUCOMTR-MCNC: 102 MG/DL (ref 70–105)
GLUCOSE BLDC GLUCOMTR-MCNC: 103 MG/DL (ref 70–105)
GLUCOSE BLDC GLUCOMTR-MCNC: 103 MG/DL (ref 70–130)
GLUCOSE BLDC GLUCOMTR-MCNC: 105 MG/DL (ref 70–105)
GLUCOSE BLDC GLUCOMTR-MCNC: 106 MG/DL (ref 70–105)
GLUCOSE BLDC GLUCOMTR-MCNC: 108 MG/DL (ref 70–130)
GLUCOSE BLDC GLUCOMTR-MCNC: 110 MG/DL (ref 70–130)
GLUCOSE BLDC GLUCOMTR-MCNC: 110 MG/DL (ref 70–130)
GLUCOSE BLDC GLUCOMTR-MCNC: 111 MG/DL (ref 70–105)
GLUCOSE BLDC GLUCOMTR-MCNC: 111 MG/DL (ref 70–130)
GLUCOSE BLDC GLUCOMTR-MCNC: 113 MG/DL (ref 70–130)
GLUCOSE BLDC GLUCOMTR-MCNC: 115 MG/DL (ref 70–105)
GLUCOSE BLDC GLUCOMTR-MCNC: 116 MG/DL (ref 70–130)
GLUCOSE BLDC GLUCOMTR-MCNC: 118 MG/DL (ref 70–130)
GLUCOSE BLDC GLUCOMTR-MCNC: 120 MG/DL (ref 70–105)
GLUCOSE BLDC GLUCOMTR-MCNC: 120 MG/DL (ref 70–105)
GLUCOSE BLDC GLUCOMTR-MCNC: 121 MG/DL (ref 70–130)
GLUCOSE BLDC GLUCOMTR-MCNC: 123 MG/DL (ref 70–130)
GLUCOSE BLDC GLUCOMTR-MCNC: 124 MG/DL (ref 70–105)
GLUCOSE BLDC GLUCOMTR-MCNC: 125 MG/DL (ref 70–105)
GLUCOSE BLDC GLUCOMTR-MCNC: 127 MG/DL (ref 70–130)
GLUCOSE BLDC GLUCOMTR-MCNC: 128 MG/DL (ref 70–105)
GLUCOSE BLDC GLUCOMTR-MCNC: 133 MG/DL (ref 70–105)
GLUCOSE BLDC GLUCOMTR-MCNC: 133 MG/DL (ref 70–105)
GLUCOSE BLDC GLUCOMTR-MCNC: 134 MG/DL (ref 70–105)
GLUCOSE BLDC GLUCOMTR-MCNC: 136 MG/DL (ref 70–130)
GLUCOSE BLDC GLUCOMTR-MCNC: 137 MG/DL (ref 70–130)
GLUCOSE BLDC GLUCOMTR-MCNC: 139 MG/DL (ref 70–130)
GLUCOSE BLDC GLUCOMTR-MCNC: 139 MG/DL (ref 70–130)
GLUCOSE BLDC GLUCOMTR-MCNC: 141 MG/DL (ref 70–130)
GLUCOSE BLDC GLUCOMTR-MCNC: 142 MG/DL (ref 70–130)
GLUCOSE BLDC GLUCOMTR-MCNC: 143 MG/DL (ref 70–105)
GLUCOSE BLDC GLUCOMTR-MCNC: 143 MG/DL (ref 70–130)
GLUCOSE BLDC GLUCOMTR-MCNC: 145 MG/DL (ref 70–105)
GLUCOSE BLDC GLUCOMTR-MCNC: 150 MG/DL (ref 70–105)
GLUCOSE BLDC GLUCOMTR-MCNC: 152 MG/DL (ref 70–105)
GLUCOSE BLDC GLUCOMTR-MCNC: 155 MG/DL (ref 70–105)
GLUCOSE BLDC GLUCOMTR-MCNC: 157 MG/DL (ref 70–105)
GLUCOSE BLDC GLUCOMTR-MCNC: 158 MG/DL (ref 70–130)
GLUCOSE BLDC GLUCOMTR-MCNC: 159 MG/DL (ref 70–130)
GLUCOSE BLDC GLUCOMTR-MCNC: 166 MG/DL (ref 70–105)
GLUCOSE BLDC GLUCOMTR-MCNC: 166 MG/DL (ref 70–105)
GLUCOSE BLDC GLUCOMTR-MCNC: 171 MG/DL (ref 70–105)
GLUCOSE BLDC GLUCOMTR-MCNC: 173 MG/DL (ref 70–105)
GLUCOSE BLDC GLUCOMTR-MCNC: 173 MG/DL (ref 70–130)
GLUCOSE BLDC GLUCOMTR-MCNC: 178 MG/DL (ref 70–105)
GLUCOSE BLDC GLUCOMTR-MCNC: 178 MG/DL (ref 70–105)
GLUCOSE BLDC GLUCOMTR-MCNC: 181 MG/DL (ref 70–130)
GLUCOSE BLDC GLUCOMTR-MCNC: 183 MG/DL (ref 70–105)
GLUCOSE BLDC GLUCOMTR-MCNC: 183 MG/DL (ref 70–105)
GLUCOSE BLDC GLUCOMTR-MCNC: 183 MG/DL (ref 70–130)
GLUCOSE BLDC GLUCOMTR-MCNC: 185 MG/DL (ref 70–105)
GLUCOSE BLDC GLUCOMTR-MCNC: 185 MG/DL (ref 70–130)
GLUCOSE BLDC GLUCOMTR-MCNC: 193 MG/DL (ref 70–105)
GLUCOSE BLDC GLUCOMTR-MCNC: 198 MG/DL (ref 70–105)
GLUCOSE BLDC GLUCOMTR-MCNC: 201 MG/DL (ref 70–130)
GLUCOSE BLDC GLUCOMTR-MCNC: 202 MG/DL (ref 70–130)
GLUCOSE BLDC GLUCOMTR-MCNC: 203 MG/DL (ref 70–105)
GLUCOSE BLDC GLUCOMTR-MCNC: 212 MG/DL (ref 70–130)
GLUCOSE BLDC GLUCOMTR-MCNC: 212 MG/DL (ref 70–130)
GLUCOSE BLDC GLUCOMTR-MCNC: 214 MG/DL (ref 70–105)
GLUCOSE BLDC GLUCOMTR-MCNC: 216 MG/DL (ref 70–105)
GLUCOSE BLDC GLUCOMTR-MCNC: 218 MG/DL (ref 70–105)
GLUCOSE BLDC GLUCOMTR-MCNC: 222 MG/DL (ref 70–105)
GLUCOSE BLDC GLUCOMTR-MCNC: 223 MG/DL (ref 70–105)
GLUCOSE BLDC GLUCOMTR-MCNC: 225 MG/DL (ref 70–105)
GLUCOSE BLDC GLUCOMTR-MCNC: 232 MG/DL (ref 70–105)
GLUCOSE BLDC GLUCOMTR-MCNC: 235 MG/DL (ref 70–130)
GLUCOSE BLDC GLUCOMTR-MCNC: 236 MG/DL (ref 70–130)
GLUCOSE BLDC GLUCOMTR-MCNC: 236 MG/DL (ref 70–130)
GLUCOSE BLDC GLUCOMTR-MCNC: 238 MG/DL (ref 70–105)
GLUCOSE BLDC GLUCOMTR-MCNC: 240 MG/DL (ref 70–130)
GLUCOSE BLDC GLUCOMTR-MCNC: 243 MG/DL (ref 70–130)
GLUCOSE BLDC GLUCOMTR-MCNC: 247 MG/DL (ref 70–105)
GLUCOSE BLDC GLUCOMTR-MCNC: 249 MG/DL (ref 70–105)
GLUCOSE BLDC GLUCOMTR-MCNC: 249 MG/DL (ref 70–130)
GLUCOSE BLDC GLUCOMTR-MCNC: 257 MG/DL (ref 70–105)
GLUCOSE BLDC GLUCOMTR-MCNC: 258 MG/DL (ref 70–130)
GLUCOSE BLDC GLUCOMTR-MCNC: 264 MG/DL (ref 70–130)
GLUCOSE BLDC GLUCOMTR-MCNC: 264 MG/DL (ref 70–130)
GLUCOSE BLDC GLUCOMTR-MCNC: 276 MG/DL (ref 70–130)
GLUCOSE BLDC GLUCOMTR-MCNC: 284 MG/DL (ref 70–105)
GLUCOSE BLDC GLUCOMTR-MCNC: 285 MG/DL (ref 70–130)
GLUCOSE BLDC GLUCOMTR-MCNC: 286 MG/DL (ref 70–105)
GLUCOSE BLDC GLUCOMTR-MCNC: 293 MG/DL (ref 70–105)
GLUCOSE BLDC GLUCOMTR-MCNC: 302 MG/DL (ref 70–130)
GLUCOSE BLDC GLUCOMTR-MCNC: 306 MG/DL (ref 70–105)
GLUCOSE BLDC GLUCOMTR-MCNC: 31 MG/DL (ref 70–105)
GLUCOSE BLDC GLUCOMTR-MCNC: 32 MG/DL (ref 70–105)
GLUCOSE BLDC GLUCOMTR-MCNC: 32 MG/DL (ref 70–105)
GLUCOSE BLDC GLUCOMTR-MCNC: 322 MG/DL (ref 70–105)
GLUCOSE BLDC GLUCOMTR-MCNC: 324 MG/DL (ref 70–130)
GLUCOSE BLDC GLUCOMTR-MCNC: 325 MG/DL (ref 70–130)
GLUCOSE BLDC GLUCOMTR-MCNC: 325 MG/DL (ref 70–130)
GLUCOSE BLDC GLUCOMTR-MCNC: 333 MG/DL (ref 70–130)
GLUCOSE BLDC GLUCOMTR-MCNC: 336 MG/DL (ref 70–130)
GLUCOSE BLDC GLUCOMTR-MCNC: 341 MG/DL (ref 70–130)
GLUCOSE BLDC GLUCOMTR-MCNC: 347 MG/DL (ref 70–105)
GLUCOSE BLDC GLUCOMTR-MCNC: 352 MG/DL (ref 70–130)
GLUCOSE BLDC GLUCOMTR-MCNC: 376 MG/DL (ref 70–130)
GLUCOSE BLDC GLUCOMTR-MCNC: 38 MG/DL (ref 70–130)
GLUCOSE BLDC GLUCOMTR-MCNC: 39 MG/DL (ref 70–130)
GLUCOSE BLDC GLUCOMTR-MCNC: 40 MG/DL (ref 70–105)
GLUCOSE BLDC GLUCOMTR-MCNC: 40 MG/DL (ref 70–130)
GLUCOSE BLDC GLUCOMTR-MCNC: 41 MG/DL (ref 70–105)
GLUCOSE BLDC GLUCOMTR-MCNC: 41 MG/DL (ref 70–105)
GLUCOSE BLDC GLUCOMTR-MCNC: 42 MG/DL (ref 70–105)
GLUCOSE BLDC GLUCOMTR-MCNC: 44 MG/DL (ref 70–105)
GLUCOSE BLDC GLUCOMTR-MCNC: 441 MG/DL (ref 70–130)
GLUCOSE BLDC GLUCOMTR-MCNC: 442 MG/DL (ref 70–130)
GLUCOSE BLDC GLUCOMTR-MCNC: 45 MG/DL (ref 70–105)
GLUCOSE BLDC GLUCOMTR-MCNC: 51 MG/DL (ref 70–130)
GLUCOSE BLDC GLUCOMTR-MCNC: 58 MG/DL (ref 70–105)
GLUCOSE BLDC GLUCOMTR-MCNC: 62 MG/DL (ref 70–105)
GLUCOSE BLDC GLUCOMTR-MCNC: 63 MG/DL (ref 70–130)
GLUCOSE BLDC GLUCOMTR-MCNC: 64 MG/DL (ref 70–130)
GLUCOSE BLDC GLUCOMTR-MCNC: 65 MG/DL (ref 70–130)
GLUCOSE BLDC GLUCOMTR-MCNC: 67 MG/DL (ref 70–130)
GLUCOSE BLDC GLUCOMTR-MCNC: 68 MG/DL (ref 70–130)
GLUCOSE BLDC GLUCOMTR-MCNC: 69 MG/DL (ref 70–130)
GLUCOSE BLDC GLUCOMTR-MCNC: 73 MG/DL (ref 70–130)
GLUCOSE BLDC GLUCOMTR-MCNC: 74 MG/DL (ref 70–105)
GLUCOSE BLDC GLUCOMTR-MCNC: 74 MG/DL (ref 70–130)
GLUCOSE BLDC GLUCOMTR-MCNC: 77 MG/DL (ref 70–105)
GLUCOSE BLDC GLUCOMTR-MCNC: 79 MG/DL (ref 70–130)
GLUCOSE BLDC GLUCOMTR-MCNC: 85 MG/DL (ref 70–130)
GLUCOSE BLDC GLUCOMTR-MCNC: 86 MG/DL (ref 70–130)
GLUCOSE BLDC GLUCOMTR-MCNC: 93 MG/DL (ref 70–105)
GLUCOSE BLDC GLUCOMTR-MCNC: 93 MG/DL (ref 70–105)
GLUCOSE BLDC GLUCOMTR-MCNC: 99 MG/DL (ref 70–105)
GLUCOSE UR STRIP-MCNC: ABNORMAL MG/DL
GLUCOSE UR STRIP-MCNC: ABNORMAL MG/DL
HADV DNA SPEC NAA+PROBE: NOT DETECTED
HBA1C MFR BLD: 9.2 % (ref 4.8–5.6)
HCOV 229E RNA SPEC QL NAA+PROBE: NOT DETECTED
HCOV HKU1 RNA SPEC QL NAA+PROBE: NOT DETECTED
HCOV NL63 RNA SPEC QL NAA+PROBE: NOT DETECTED
HCOV OC43 RNA SPEC QL NAA+PROBE: NOT DETECTED
HCT VFR BLD AUTO: 28.3 % (ref 37.5–51)
HCT VFR BLD AUTO: 28.6 % (ref 37.5–51)
HCT VFR BLD AUTO: 30.8 % (ref 37.5–51)
HCT VFR BLD AUTO: 31.4 % (ref 37.5–51)
HCT VFR BLD AUTO: 31.6 % (ref 37.5–51)
HCT VFR BLD AUTO: 32.5 % (ref 37.5–51)
HCT VFR BLD AUTO: 33 % (ref 37.5–51)
HCT VFR BLD AUTO: 33.1 % (ref 37.5–51)
HCT VFR BLD AUTO: 33.4 % (ref 37.5–51)
HCT VFR BLD AUTO: 35 % (ref 37.5–51)
HCT VFR BLD AUTO: 36.7 % (ref 37.5–51)
HCT VFR BLD AUTO: 38 % (ref 37.5–51)
HDLC SERPL-MCNC: 47 MG/DL (ref 40–60)
HGB BLD-MCNC: 10.2 G/DL (ref 13–17.7)
HGB BLD-MCNC: 10.4 G/DL (ref 13–17.7)
HGB BLD-MCNC: 10.5 G/DL (ref 13–17.7)
HGB BLD-MCNC: 10.5 G/DL (ref 13–17.7)
HGB BLD-MCNC: 11 G/DL (ref 13–17.7)
HGB BLD-MCNC: 11 G/DL (ref 13–17.7)
HGB BLD-MCNC: 11.1 G/DL (ref 13–17.7)
HGB BLD-MCNC: 11.1 G/DL (ref 13–17.7)
HGB BLD-MCNC: 12 G/DL (ref 13–17.7)
HGB BLD-MCNC: 12.2 G/DL (ref 13–17.7)
HGB BLD-MCNC: 13.1 G/DL (ref 13–17.7)
HGB BLD-MCNC: 9.5 G/DL (ref 13–17.7)
HGB UR QL STRIP.AUTO: ABNORMAL
HGB UR QL STRIP.AUTO: ABNORMAL
HMPV RNA NPH QL NAA+NON-PROBE: DETECTED
HOLD SPECIMEN: NORMAL
HPIV1 RNA SPEC QL NAA+PROBE: NOT DETECTED
HPIV2 RNA SPEC QL NAA+PROBE: NOT DETECTED
HPIV3 RNA NPH QL NAA+PROBE: NOT DETECTED
HPIV4 P GENE NPH QL NAA+PROBE: NOT DETECTED
HYALINE CASTS UR QL AUTO: ABNORMAL /LPF
HYALINE CASTS UR QL AUTO: ABNORMAL /LPF
IMM GRANULOCYTES # BLD AUTO: 0.08 10*3/MM3 (ref 0–0.05)
IMM GRANULOCYTES NFR BLD AUTO: 0.7 % (ref 0–0.5)
KETONES UR QL STRIP: NEGATIVE
KETONES UR QL STRIP: NEGATIVE
LDLC SERPL CALC-MCNC: 84 MG/DL (ref 0–100)
LDLC/HDLC SERPL: 1.79 {RATIO}
LEUKOCYTE ESTERASE UR QL STRIP.AUTO: ABNORMAL
LEUKOCYTE ESTERASE UR QL STRIP.AUTO: ABNORMAL
LYMPHOCYTES # BLD AUTO: 1 10*3/MM3 (ref 0.7–3.1)
LYMPHOCYTES # BLD AUTO: 1.1 10*3/MM3 (ref 0.7–3.1)
LYMPHOCYTES # BLD AUTO: 1.4 10*3/MM3 (ref 0.7–3.1)
LYMPHOCYTES # BLD AUTO: 1.5 10*3/MM3 (ref 0.7–3.1)
LYMPHOCYTES # BLD AUTO: 1.6 10*3/MM3 (ref 0.7–3.1)
LYMPHOCYTES # BLD AUTO: 1.7 10*3/MM3 (ref 0.7–3.1)
LYMPHOCYTES # BLD AUTO: 1.7 10*3/MM3 (ref 0.7–3.1)
LYMPHOCYTES # BLD AUTO: 2.46 10*3/MM3 (ref 0.7–3.1)
LYMPHOCYTES # BLD AUTO: 2.5 10*3/MM3 (ref 0.7–3.1)
LYMPHOCYTES NFR BLD AUTO: 10.4 % (ref 19.6–45.3)
LYMPHOCYTES NFR BLD AUTO: 11.8 % (ref 19.6–45.3)
LYMPHOCYTES NFR BLD AUTO: 17.9 % (ref 19.6–45.3)
LYMPHOCYTES NFR BLD AUTO: 18.1 % (ref 19.6–45.3)
LYMPHOCYTES NFR BLD AUTO: 20.3 % (ref 19.6–45.3)
LYMPHOCYTES NFR BLD AUTO: 20.4 % (ref 19.6–45.3)
LYMPHOCYTES NFR BLD AUTO: 24 % (ref 19.6–45.3)
LYMPHOCYTES NFR BLD AUTO: 29.5 % (ref 19.6–45.3)
LYMPHOCYTES NFR BLD AUTO: 30.9 % (ref 19.6–45.3)
M PNEUMO IGG SER IA-ACNC: NOT DETECTED
MAGNESIUM SERPL-MCNC: 2 MG/DL (ref 1.6–2.4)
MCH RBC QN AUTO: 30.4 PG (ref 26.6–33)
MCH RBC QN AUTO: 30.6 PG (ref 26.6–33)
MCH RBC QN AUTO: 30.8 PG (ref 26.6–33)
MCH RBC QN AUTO: 30.8 PG (ref 26.6–33)
MCH RBC QN AUTO: 30.9 PG (ref 26.6–33)
MCH RBC QN AUTO: 31.1 PG (ref 26.6–33)
MCH RBC QN AUTO: 31.3 PG (ref 26.6–33)
MCH RBC QN AUTO: 31.4 PG (ref 26.6–33)
MCH RBC QN AUTO: 31.6 PG (ref 26.6–33)
MCH RBC QN AUTO: 34.4 PG (ref 26.6–33)
MCHC RBC AUTO-ENTMCNC: 32.9 G/DL (ref 31.5–35.7)
MCHC RBC AUTO-ENTMCNC: 33.1 G/DL (ref 31.5–35.7)
MCHC RBC AUTO-ENTMCNC: 33.2 G/DL (ref 31.5–35.7)
MCHC RBC AUTO-ENTMCNC: 33.2 G/DL (ref 31.5–35.7)
MCHC RBC AUTO-ENTMCNC: 33.3 G/DL (ref 31.5–35.7)
MCHC RBC AUTO-ENTMCNC: 33.4 G/DL (ref 31.5–35.7)
MCHC RBC AUTO-ENTMCNC: 33.6 G/DL (ref 31.5–35.7)
MCHC RBC AUTO-ENTMCNC: 33.7 G/DL (ref 31.5–35.7)
MCHC RBC AUTO-ENTMCNC: 33.8 G/DL (ref 31.5–35.7)
MCHC RBC AUTO-ENTMCNC: 34.3 G/DL (ref 31.5–35.7)
MCHC RBC AUTO-ENTMCNC: 34.5 G/DL (ref 31.5–35.7)
MCHC RBC AUTO-ENTMCNC: 36.4 G/DL (ref 31.5–35.7)
MCV RBC AUTO: 90 FL (ref 79–97)
MCV RBC AUTO: 90.5 FL (ref 79–97)
MCV RBC AUTO: 91.1 FL (ref 79–97)
MCV RBC AUTO: 91.6 FL (ref 79–97)
MCV RBC AUTO: 92.8 FL (ref 79–97)
MCV RBC AUTO: 92.9 FL (ref 79–97)
MCV RBC AUTO: 93.4 FL (ref 79–97)
MCV RBC AUTO: 93.9 FL (ref 79–97)
MCV RBC AUTO: 93.9 FL (ref 79–97)
MCV RBC AUTO: 94.5 FL (ref 79–97)
MCV RBC AUTO: 94.6 FL (ref 79–97)
MCV RBC AUTO: 94.7 FL (ref 79–97)
MONOCYTES # BLD AUTO: 0.6 10*3/MM3 (ref 0.1–0.9)
MONOCYTES # BLD AUTO: 0.8 10*3/MM3 (ref 0.1–0.9)
MONOCYTES # BLD AUTO: 0.8 10*3/MM3 (ref 0.1–0.9)
MONOCYTES # BLD AUTO: 1 10*3/MM3 (ref 0.1–0.9)
MONOCYTES # BLD AUTO: 1 10*3/MM3 (ref 0.1–0.9)
MONOCYTES # BLD AUTO: 1.1 10*3/MM3 (ref 0.1–0.9)
MONOCYTES # BLD AUTO: 1.32 10*3/MM3 (ref 0.1–0.9)
MONOCYTES NFR BLD AUTO: 10.1 % (ref 5–12)
MONOCYTES NFR BLD AUTO: 10.6 % (ref 5–12)
MONOCYTES NFR BLD AUTO: 10.9 % (ref 5–12)
MONOCYTES NFR BLD AUTO: 10.9 % (ref 5–12)
MONOCYTES NFR BLD AUTO: 11.3 % (ref 5–12)
MONOCYTES NFR BLD AUTO: 11.3 % (ref 5–12)
MONOCYTES NFR BLD AUTO: 11.8 % (ref 5–12)
MONOCYTES NFR BLD AUTO: 13.3 % (ref 5–12)
MONOCYTES NFR BLD AUTO: 13.6 % (ref 5–12)
NEUTROPHILS # BLD AUTO: 3 10*3/MM3 (ref 1.7–7)
NEUTROPHILS # BLD AUTO: 3.2 10*3/MM3 (ref 1.7–7)
NEUTROPHILS # BLD AUTO: 4.7 10*3/MM3 (ref 1.7–7)
NEUTROPHILS # BLD AUTO: 5.3 10*3/MM3 (ref 1.7–7)
NEUTROPHILS # BLD AUTO: 6.3 10*3/MM3 (ref 1.7–7)
NEUTROPHILS # BLD AUTO: 6.3 10*3/MM3 (ref 1.7–7)
NEUTROPHILS # BLD AUTO: 7.4 10*3/MM3 (ref 1.7–7)
NEUTROPHILS # BLD AUTO: 7.4 10*3/MM3 (ref 1.7–7)
NEUTROPHILS # BLD AUTO: 8.06 10*3/MM3 (ref 1.7–7)
NEUTROPHILS NFR BLD AUTO: 54.8 % (ref 42.7–76)
NEUTROPHILS NFR BLD AUTO: 55.1 % (ref 42.7–76)
NEUTROPHILS NFR BLD AUTO: 61.3 % (ref 42.7–76)
NEUTROPHILS NFR BLD AUTO: 64.8 % (ref 42.7–76)
NEUTROPHILS NFR BLD AUTO: 66.4 % (ref 42.7–76)
NEUTROPHILS NFR BLD AUTO: 69.8 % (ref 42.7–76)
NEUTROPHILS NFR BLD AUTO: 70 % (ref 42.7–76)
NEUTROPHILS NFR BLD AUTO: 77.3 % (ref 42.7–76)
NEUTROPHILS NFR BLD AUTO: 77.7 % (ref 42.7–76)
NITRITE UR QL STRIP: NEGATIVE
NITRITE UR QL STRIP: NEGATIVE
NRBC BLD AUTO-RTO: 0 /100 WBC (ref 0–0.2)
NRBC BLD AUTO-RTO: 0.1 /100 WBC (ref 0–0.2)
NRBC BLD AUTO-RTO: 0.2 /100 WBC (ref 0–0.2)
NT-PROBNP SERPL-MCNC: 1692 PG/ML (ref 5–1800)
PH UR STRIP.AUTO: 7 [PH] (ref 5–8)
PH UR STRIP.AUTO: 7.5 [PH] (ref 5–8)
PLATELET # BLD AUTO: 286 10*3/MM3 (ref 140–450)
PLATELET # BLD AUTO: 297 10*3/MM3 (ref 140–450)
PLATELET # BLD AUTO: 298 10*3/MM3 (ref 140–450)
PLATELET # BLD AUTO: 302 10*3/MM3 (ref 140–450)
PLATELET # BLD AUTO: 303 10*3/MM3 (ref 140–450)
PLATELET # BLD AUTO: 324 10*3/MM3 (ref 140–450)
PLATELET # BLD AUTO: 333 10*3/MM3 (ref 140–450)
PLATELET # BLD AUTO: 343 10*3/MM3 (ref 140–450)
PLATELET # BLD AUTO: 349 10*3/MM3 (ref 140–450)
PLATELET # BLD AUTO: 353 10*3/MM3 (ref 140–450)
PLATELET # BLD AUTO: 359 10*3/MM3 (ref 140–450)
PLATELET # BLD AUTO: 493 10*3/MM3 (ref 140–450)
PMV BLD AUTO: 6.3 FL (ref 6–12)
PMV BLD AUTO: 6.4 FL (ref 6–12)
PMV BLD AUTO: 6.6 FL (ref 6–12)
PMV BLD AUTO: 6.6 FL (ref 6–12)
PMV BLD AUTO: 6.7 FL (ref 6–12)
PMV BLD AUTO: 6.8 FL (ref 6–12)
PMV BLD AUTO: 6.9 FL (ref 6–12)
PMV BLD AUTO: 7.2 FL (ref 6–12)
PMV BLD AUTO: 8.8 FL (ref 6–12)
PMV BLD AUTO: 9.5 FL (ref 6–12)
PMV BLD AUTO: 9.6 FL (ref 6–12)
PMV BLD AUTO: 9.6 FL (ref 6–12)
POTASSIUM BLD-SCNC: 3.3 MMOL/L (ref 3.5–5.2)
POTASSIUM BLD-SCNC: 3.5 MMOL/L (ref 3.5–5.2)
POTASSIUM BLD-SCNC: 3.9 MMOL/L (ref 3.5–5.2)
POTASSIUM BLD-SCNC: 4 MMOL/L (ref 3.5–5.2)
POTASSIUM BLD-SCNC: 4.1 MMOL/L (ref 3.5–5.2)
POTASSIUM BLD-SCNC: 4.2 MMOL/L (ref 3.5–5.2)
POTASSIUM BLD-SCNC: 4.2 MMOL/L (ref 3.5–5.2)
POTASSIUM BLD-SCNC: 4.8 MMOL/L (ref 3.5–5.2)
POTASSIUM BLD-SCNC: 5 MMOL/L (ref 3.5–5.2)
PROCALCITONIN SERPL-MCNC: 0.97 NG/ML (ref 0.1–0.25)
PROT SERPL-MCNC: 6.6 G/DL (ref 6–8.5)
PROT SERPL-MCNC: 7.2 G/DL (ref 6–8.5)
PROT SERPL-MCNC: 7.2 G/DL (ref 6–8.5)
PROT UR QL STRIP: ABNORMAL
PROT UR QL STRIP: ABNORMAL
RBC # BLD AUTO: 3.03 10*6/MM3 (ref 4.14–5.8)
RBC # BLD AUTO: 3.03 10*6/MM3 (ref 4.14–5.8)
RBC # BLD AUTO: 3.26 10*6/MM3 (ref 4.14–5.8)
RBC # BLD AUTO: 3.38 10*6/MM3 (ref 4.14–5.8)
RBC # BLD AUTO: 3.45 10*6/MM3 (ref 4.14–5.8)
RBC # BLD AUTO: 3.5 10*6/MM3 (ref 4.14–5.8)
RBC # BLD AUTO: 3.55 10*6/MM3 (ref 4.14–5.8)
RBC # BLD AUTO: 3.56 10*6/MM3 (ref 4.14–5.8)
RBC # BLD AUTO: 3.59 10*6/MM3 (ref 4.14–5.8)
RBC # BLD AUTO: 3.89 10*6/MM3 (ref 4.14–5.8)
RBC # BLD AUTO: 3.91 10*6/MM3 (ref 4.14–5.8)
RBC # BLD AUTO: 4.17 10*6/MM3 (ref 4.14–5.8)
RBC # UR: ABNORMAL /HPF
RBC # UR: ABNORMAL /HPF
REF LAB TEST METHOD: ABNORMAL
REF LAB TEST METHOD: ABNORMAL
RHINOVIRUS RNA SPEC NAA+PROBE: NOT DETECTED
RSV RNA NPH QL NAA+NON-PROBE: NOT DETECTED
SODIUM BLD-SCNC: 134 MMOL/L (ref 136–145)
SODIUM BLD-SCNC: 135 MMOL/L (ref 136–145)
SODIUM BLD-SCNC: 137 MMOL/L (ref 136–145)
SODIUM BLD-SCNC: 139 MMOL/L (ref 136–145)
SODIUM BLD-SCNC: 139 MMOL/L (ref 136–145)
SODIUM BLD-SCNC: 140 MMOL/L (ref 136–145)
SODIUM BLD-SCNC: 140 MMOL/L (ref 136–145)
SODIUM BLD-SCNC: 141 MMOL/L (ref 136–145)
SP GR UR STRIP: 1.01 (ref 1–1.03)
SP GR UR STRIP: 1.02 (ref 1–1.03)
SQUAMOUS #/AREA URNS HPF: ABNORMAL /HPF
SQUAMOUS #/AREA URNS HPF: ABNORMAL /HPF
TRIGL SERPL-MCNC: 59 MG/DL (ref 0–150)
UROBILINOGEN UR QL STRIP: ABNORMAL
UROBILINOGEN UR QL STRIP: ABNORMAL
VALPROATE SERPL-MCNC: 26.7 MCG/ML (ref 50–125)
VALPROATE SERPL-MCNC: 33 MCG/ML (ref 50–125)
VALPROATE SERPL-MCNC: 34 MCG/ML (ref 50–125)
VLDLC SERPL-MCNC: 11.8 MG/DL (ref 5–40)
WBC NRBC COR # BLD: 10.3 10*3/MM3 (ref 3.4–10.8)
WBC NRBC COR # BLD: 12.13 10*3/MM3 (ref 3.4–10.8)
WBC NRBC COR # BLD: 5.5 10*3/MM3 (ref 3.4–10.8)
WBC NRBC COR # BLD: 5.9 10*3/MM3 (ref 3.4–10.8)
WBC NRBC COR # BLD: 7.3 10*3/MM3 (ref 3.4–10.8)
WBC NRBC COR # BLD: 7.6 10*3/MM3 (ref 3.4–10.8)
WBC NRBC COR # BLD: 7.7 10*3/MM3 (ref 3.4–10.8)
WBC NRBC COR # BLD: 7.86 10*3/MM3 (ref 3.4–10.8)
WBC NRBC COR # BLD: 9 10*3/MM3 (ref 3.4–10.8)
WBC NRBC COR # BLD: 9.34 10*3/MM3 (ref 3.4–10.8)
WBC NRBC COR # BLD: 9.5 10*3/MM3 (ref 3.4–10.8)
WBC NRBC COR # BLD: 9.6 10*3/MM3 (ref 3.4–10.8)
WBC UR QL AUTO: ABNORMAL /HPF
WBC UR QL AUTO: ABNORMAL /HPF
WHOLE BLOOD HOLD SPECIMEN: NORMAL
WHOLE BLOOD HOLD SPECIMEN: NORMAL

## 2019-01-01 PROCEDURE — 63710000001 INSULIN LISPRO (HUMAN) PER 5 UNITS: Performed by: INTERNAL MEDICINE

## 2019-01-01 PROCEDURE — 63710000001 INSULIN GLARGINE PER 5 UNITS: Performed by: INTERNAL MEDICINE

## 2019-01-01 PROCEDURE — G0515 COGNITIVE SKILLS DEVELOPMENT: HCPCS

## 2019-01-01 PROCEDURE — 85025 COMPLETE CBC W/AUTO DIFF WBC: CPT | Performed by: INTERNAL MEDICINE

## 2019-01-01 PROCEDURE — 25010000002 PIPERACILLIN SOD-TAZOBACTAM PER 1 G: Performed by: INTERNAL MEDICINE

## 2019-01-01 PROCEDURE — 99232 SBSQ HOSP IP/OBS MODERATE 35: CPT | Performed by: INTERNAL MEDICINE

## 2019-01-01 PROCEDURE — 82962 GLUCOSE BLOOD TEST: CPT

## 2019-01-01 PROCEDURE — G0378 HOSPITAL OBSERVATION PER HR: HCPCS

## 2019-01-01 PROCEDURE — 97116 GAIT TRAINING THERAPY: CPT

## 2019-01-01 PROCEDURE — 97535 SELF CARE MNGMENT TRAINING: CPT

## 2019-01-01 PROCEDURE — 85027 COMPLETE CBC AUTOMATED: CPT | Performed by: INTERNAL MEDICINE

## 2019-01-01 PROCEDURE — 97530 THERAPEUTIC ACTIVITIES: CPT

## 2019-01-01 PROCEDURE — 63710000001 INSULIN LISPRO (HUMAN) PER 5 UNITS: Performed by: SPECIALIST

## 2019-01-01 PROCEDURE — 25010000002 HALOPERIDOL LACTATE PER 5 MG: Performed by: INTERNAL MEDICINE

## 2019-01-01 PROCEDURE — 96372 THER/PROPH/DIAG INJ SC/IM: CPT

## 2019-01-01 PROCEDURE — 25010000002 HEPARIN (PORCINE) PER 1000 UNITS: Performed by: INTERNAL MEDICINE

## 2019-01-01 PROCEDURE — 85027 COMPLETE CBC AUTOMATED: CPT | Performed by: PSYCHIATRY & NEUROLOGY

## 2019-01-01 PROCEDURE — 97163 PT EVAL HIGH COMPLEX 45 MIN: CPT

## 2019-01-01 PROCEDURE — 83880 ASSAY OF NATRIURETIC PEPTIDE: CPT | Performed by: INTERNAL MEDICINE

## 2019-01-01 PROCEDURE — 25010000002 CEFTRIAXONE PER 250 MG: Performed by: INTERNAL MEDICINE

## 2019-01-01 PROCEDURE — 96365 THER/PROPH/DIAG IV INF INIT: CPT

## 2019-01-01 PROCEDURE — 87086 URINE CULTURE/COLONY COUNT: CPT | Performed by: EMERGENCY MEDICINE

## 2019-01-01 PROCEDURE — 80164 ASSAY DIPROPYLACETIC ACD TOT: CPT | Performed by: EMERGENCY MEDICINE

## 2019-01-01 PROCEDURE — 80053 COMPREHEN METABOLIC PANEL: CPT | Performed by: INTERNAL MEDICINE

## 2019-01-01 PROCEDURE — 80048 BASIC METABOLIC PNL TOTAL CA: CPT | Performed by: INTERNAL MEDICINE

## 2019-01-01 PROCEDURE — 83605 ASSAY OF LACTIC ACID: CPT | Performed by: INTERNAL MEDICINE

## 2019-01-01 PROCEDURE — 71045 X-RAY EXAM CHEST 1 VIEW: CPT

## 2019-01-01 PROCEDURE — 99231 SBSQ HOSP IP/OBS SF/LOW 25: CPT | Performed by: PHYSICIAN ASSISTANT

## 2019-01-01 PROCEDURE — 99223 1ST HOSP IP/OBS HIGH 75: CPT | Performed by: INTERNAL MEDICINE

## 2019-01-01 PROCEDURE — 90791 PSYCH DIAGNOSTIC EVALUATION: CPT | Performed by: SOCIAL WORKER

## 2019-01-01 PROCEDURE — 81001 URINALYSIS AUTO W/SCOPE: CPT | Performed by: EMERGENCY MEDICINE

## 2019-01-01 PROCEDURE — 80053 COMPREHEN METABOLIC PANEL: CPT | Performed by: PSYCHIATRY & NEUROLOGY

## 2019-01-01 PROCEDURE — 97166 OT EVAL MOD COMPLEX 45 MIN: CPT

## 2019-01-01 PROCEDURE — 97110 THERAPEUTIC EXERCISES: CPT

## 2019-01-01 PROCEDURE — 63710000001 INSULIN LISPRO (HUMAN) PER 5 UNITS: Performed by: NURSE PRACTITIONER

## 2019-01-01 PROCEDURE — 99238 HOSP IP/OBS DSCHRG MGMT 30/<: CPT | Performed by: INTERNAL MEDICINE

## 2019-01-01 PROCEDURE — P9612 CATHETERIZE FOR URINE SPEC: HCPCS

## 2019-01-01 PROCEDURE — 63710000001 INSULIN GLARGINE PER 5 UNITS: Performed by: NURSE PRACTITIONER

## 2019-01-01 PROCEDURE — 99285 EMERGENCY DEPT VISIT HI MDM: CPT

## 2019-01-01 PROCEDURE — 96366 THER/PROPH/DIAG IV INF ADDON: CPT

## 2019-01-01 PROCEDURE — 96375 TX/PRO/DX INJ NEW DRUG ADDON: CPT

## 2019-01-01 PROCEDURE — 83605 ASSAY OF LACTIC ACID: CPT

## 2019-01-01 PROCEDURE — 80061 LIPID PANEL: CPT | Performed by: SPECIALIST

## 2019-01-01 PROCEDURE — 80053 COMPREHEN METABOLIC PANEL: CPT | Performed by: EMERGENCY MEDICINE

## 2019-01-01 PROCEDURE — 25010000002 CEFEPIME PER 500 MG: Performed by: INTERNAL MEDICINE

## 2019-01-01 PROCEDURE — 51798 US URINE CAPACITY MEASURE: CPT

## 2019-01-01 PROCEDURE — 25010000002 LORAZEPAM PER 2 MG: Performed by: NURSE PRACTITIONER

## 2019-01-01 PROCEDURE — 36415 COLL VENOUS BLD VENIPUNCTURE: CPT

## 2019-01-01 PROCEDURE — 83735 ASSAY OF MAGNESIUM: CPT | Performed by: INTERNAL MEDICINE

## 2019-01-01 PROCEDURE — 85025 COMPLETE CBC W/AUTO DIFF WBC: CPT | Performed by: EMERGENCY MEDICINE

## 2019-01-01 PROCEDURE — 25010000002 CEFEPIME PER 500 MG: Performed by: EMERGENCY MEDICINE

## 2019-01-01 PROCEDURE — 97112 NEUROMUSCULAR REEDUCATION: CPT

## 2019-01-01 PROCEDURE — 99222 1ST HOSP IP/OBS MODERATE 55: CPT | Performed by: PHYSICIAN ASSISTANT

## 2019-01-01 PROCEDURE — 94640 AIRWAY INHALATION TREATMENT: CPT

## 2019-01-01 PROCEDURE — 83036 HEMOGLOBIN GLYCOSYLATED A1C: CPT | Performed by: NURSE PRACTITIONER

## 2019-01-01 PROCEDURE — 99232 SBSQ HOSP IP/OBS MODERATE 35: CPT | Performed by: PSYCHIATRY & NEUROLOGY

## 2019-01-01 PROCEDURE — 25010000002 ZIPRASIDONE MESYLATE PER 10 MG: Performed by: INTERNAL MEDICINE

## 2019-01-01 PROCEDURE — 25010000002 ZIPRASIDONE MESYLATE PER 10 MG: Performed by: EMERGENCY MEDICINE

## 2019-01-01 PROCEDURE — 80048 BASIC METABOLIC PNL TOTAL CA: CPT | Performed by: EMERGENCY MEDICINE

## 2019-01-01 PROCEDURE — 36415 COLL VENOUS BLD VENIPUNCTURE: CPT | Performed by: NURSE PRACTITIONER

## 2019-01-01 PROCEDURE — 87040 BLOOD CULTURE FOR BACTERIA: CPT | Performed by: EMERGENCY MEDICINE

## 2019-01-01 PROCEDURE — 25010000003 CEFTRIAXONE PER 250 MG: Performed by: EMERGENCY MEDICINE

## 2019-01-01 PROCEDURE — 0099U HC BIOFIRE FILMARRAY RESP PANEL 1: CPT | Performed by: INTERNAL MEDICINE

## 2019-01-01 PROCEDURE — 84145 PROCALCITONIN (PCT): CPT | Performed by: INTERNAL MEDICINE

## 2019-01-01 PROCEDURE — 80164 ASSAY DIPROPYLACETIC ACD TOT: CPT | Performed by: PSYCHIATRY & NEUROLOGY

## 2019-01-01 PROCEDURE — 25010000003 CEFTRIAXONE PER 250 MG: Performed by: NURSE PRACTITIONER

## 2019-01-01 RX ORDER — DIVALPROEX SODIUM 125 MG/1
250 CAPSULE, COATED PELLETS ORAL 3 TIMES DAILY
Status: DISCONTINUED | OUTPATIENT
Start: 2019-01-01 | End: 2019-01-01

## 2019-01-01 RX ORDER — ONDANSETRON 4 MG/1
4 TABLET, FILM COATED ORAL EVERY 6 HOURS PRN
Status: ON HOLD
Start: 2019-01-01 | End: 2020-01-01

## 2019-01-01 RX ORDER — INSULIN GLARGINE 100 [IU]/ML
10 INJECTION, SOLUTION SUBCUTANEOUS DAILY
Status: DISCONTINUED | OUTPATIENT
Start: 2019-01-01 | End: 2019-01-01

## 2019-01-01 RX ORDER — DEXTROSE MONOHYDRATE 25 G/50ML
25 INJECTION, SOLUTION INTRAVENOUS
Status: DISCONTINUED | OUTPATIENT
Start: 2019-01-01 | End: 2019-01-01

## 2019-01-01 RX ORDER — ONDANSETRON 4 MG/1
4 TABLET, ORALLY DISINTEGRATING ORAL EVERY 6 HOURS PRN
Status: DISCONTINUED | OUTPATIENT
Start: 2019-01-01 | End: 2020-01-01 | Stop reason: HOSPADM

## 2019-01-01 RX ORDER — OLANZAPINE 5 MG/1
5 TABLET, ORALLY DISINTEGRATING ORAL 2 TIMES DAILY
Status: DISCONTINUED | OUTPATIENT
Start: 2019-01-01 | End: 2019-01-01

## 2019-01-01 RX ORDER — ZIPRASIDONE MESYLATE 20 MG/ML
10 INJECTION, POWDER, LYOPHILIZED, FOR SOLUTION INTRAMUSCULAR ONCE
Status: COMPLETED | OUTPATIENT
Start: 2019-01-01 | End: 2019-01-01

## 2019-01-01 RX ORDER — LOPERAMIDE HYDROCHLORIDE 2 MG/1
2 CAPSULE ORAL
Status: DISCONTINUED | OUTPATIENT
Start: 2019-01-01 | End: 2020-01-01 | Stop reason: HOSPADM

## 2019-01-01 RX ORDER — SODIUM CHLORIDE 0.9 % (FLUSH) 0.9 %
10 SYRINGE (ML) INJECTION EVERY 12 HOURS SCHEDULED
Status: DISCONTINUED | OUTPATIENT
Start: 2019-01-01 | End: 2019-01-01 | Stop reason: HOSPADM

## 2019-01-01 RX ORDER — INSULIN GLARGINE 100 [IU]/ML
30 INJECTION, SOLUTION SUBCUTANEOUS NIGHTLY
Status: DISCONTINUED | OUTPATIENT
Start: 2019-01-01 | End: 2019-01-01

## 2019-01-01 RX ORDER — HALOPERIDOL 5 MG/ML
5 INJECTION INTRAMUSCULAR EVERY 6 HOURS PRN
COMMUNITY
End: 2019-01-01 | Stop reason: HOSPADM

## 2019-01-01 RX ORDER — DIVALPROEX SODIUM 125 MG/1
250 CAPSULE, COATED PELLETS ORAL 3 TIMES DAILY
Status: COMPLETED | OUTPATIENT
Start: 2019-01-01 | End: 2019-01-01

## 2019-01-01 RX ORDER — DIVALPROEX SODIUM 250 MG/1
250 TABLET, DELAYED RELEASE ORAL 3 TIMES DAILY
Status: DISCONTINUED | OUTPATIENT
Start: 2019-01-01 | End: 2019-01-01

## 2019-01-01 RX ORDER — MORPHINE SULFATE 4 MG/ML
1 INJECTION, SOLUTION INTRAMUSCULAR; INTRAVENOUS
Status: DISCONTINUED | OUTPATIENT
Start: 2019-01-01 | End: 2019-01-01

## 2019-01-01 RX ORDER — INSULIN GLARGINE 100 [IU]/ML
25 INJECTION, SOLUTION SUBCUTANEOUS DAILY
Status: DISCONTINUED | OUTPATIENT
Start: 2019-01-01 | End: 2019-01-01

## 2019-01-01 RX ORDER — CEFTRIAXONE SODIUM 2 G/50ML
2 INJECTION, SOLUTION INTRAVENOUS ONCE
Status: COMPLETED | OUTPATIENT
Start: 2019-01-01 | End: 2019-01-01

## 2019-01-01 RX ORDER — ZIPRASIDONE MESYLATE 20 MG/ML
20 INJECTION, POWDER, LYOPHILIZED, FOR SOLUTION INTRAMUSCULAR EVERY 6 HOURS PRN
Status: DISCONTINUED | OUTPATIENT
Start: 2019-01-01 | End: 2019-01-01

## 2019-01-01 RX ORDER — FAMOTIDINE 20 MG/1
40 TABLET, FILM COATED ORAL 2 TIMES DAILY
COMMUNITY
End: 2019-01-01 | Stop reason: HOSPADM

## 2019-01-01 RX ORDER — IPRATROPIUM BROMIDE AND ALBUTEROL SULFATE 2.5; .5 MG/3ML; MG/3ML
3 SOLUTION RESPIRATORY (INHALATION) ONCE
Status: COMPLETED | OUTPATIENT
Start: 2019-01-01 | End: 2019-01-01

## 2019-01-01 RX ORDER — INSULIN GLARGINE 100 [IU]/ML
20 INJECTION, SOLUTION SUBCUTANEOUS DAILY
Refills: 0 | Status: ON HOLD
Start: 2019-01-01 | End: 2020-01-01

## 2019-01-01 RX ORDER — ACETAMINOPHEN 650 MG/1
650 SUPPOSITORY RECTAL EVERY 4 HOURS PRN
Status: DISCONTINUED | OUTPATIENT
Start: 2019-01-01 | End: 2019-01-01 | Stop reason: HOSPADM

## 2019-01-01 RX ORDER — CEFTRIAXONE SODIUM 2 G/50ML
2 INJECTION, SOLUTION INTRAVENOUS EVERY 24 HOURS
Status: COMPLETED | OUTPATIENT
Start: 2019-01-01 | End: 2019-01-01

## 2019-01-01 RX ORDER — ACETAMINOPHEN 325 MG/1
650 TABLET ORAL EVERY 4 HOURS PRN
Status: DISCONTINUED | OUTPATIENT
Start: 2019-01-01 | End: 2020-01-01 | Stop reason: HOSPADM

## 2019-01-01 RX ORDER — ACETAMINOPHEN 160 MG/5ML
650 SOLUTION ORAL EVERY 4 HOURS PRN
Status: DISCONTINUED | OUTPATIENT
Start: 2019-01-01 | End: 2019-01-01 | Stop reason: HOSPADM

## 2019-01-01 RX ORDER — LORAZEPAM 2 MG/ML
1 INJECTION INTRAMUSCULAR EVERY 6 HOURS PRN
COMMUNITY
End: 2019-01-01 | Stop reason: HOSPADM

## 2019-01-01 RX ORDER — BISACODYL 10 MG
10 SUPPOSITORY, RECTAL RECTAL DAILY PRN
Status: DISCONTINUED | OUTPATIENT
Start: 2019-01-01 | End: 2019-01-01 | Stop reason: HOSPADM

## 2019-01-01 RX ORDER — ONDANSETRON 4 MG/1
4 TABLET, FILM COATED ORAL EVERY 6 HOURS PRN
Status: DISCONTINUED | OUTPATIENT
Start: 2019-01-01 | End: 2019-01-01 | Stop reason: HOSPADM

## 2019-01-01 RX ORDER — POLYETHYLENE GLYCOL 3350 17 G/17G
17 POWDER, FOR SOLUTION ORAL DAILY PRN
Status: DISCONTINUED | OUTPATIENT
Start: 2019-01-01 | End: 2019-01-01 | Stop reason: HOSPADM

## 2019-01-01 RX ORDER — AMOXICILLIN AND CLAVULANATE POTASSIUM 875; 125 MG/1; MG/1
1 TABLET, FILM COATED ORAL EVERY 12 HOURS SCHEDULED
Status: COMPLETED | OUTPATIENT
Start: 2019-01-01 | End: 2019-01-01

## 2019-01-01 RX ORDER — LORAZEPAM 1 MG/1
1 TABLET ORAL NIGHTLY
Status: DISCONTINUED | OUTPATIENT
Start: 2019-01-01 | End: 2019-01-01 | Stop reason: HOSPADM

## 2019-01-01 RX ORDER — OLANZAPINE 5 MG/1
5 TABLET, ORALLY DISINTEGRATING ORAL 3 TIMES DAILY
Status: DISCONTINUED | OUTPATIENT
Start: 2019-01-01 | End: 2020-01-01

## 2019-01-01 RX ORDER — DIVALPROEX SODIUM 250 MG/1
250 TABLET, DELAYED RELEASE ORAL 3 TIMES DAILY
Status: DISCONTINUED | OUTPATIENT
Start: 2019-01-01 | End: 2019-01-01 | Stop reason: HOSPADM

## 2019-01-01 RX ORDER — ACETAMINOPHEN 325 MG/1
325 TABLET ORAL EVERY 4 HOURS PRN
Status: DISCONTINUED | OUTPATIENT
Start: 2019-01-01 | End: 2019-01-01 | Stop reason: HOSPADM

## 2019-01-01 RX ORDER — INSULIN GLARGINE 100 [IU]/ML
20 INJECTION, SOLUTION SUBCUTANEOUS DAILY
Status: DISCONTINUED | OUTPATIENT
Start: 2019-01-01 | End: 2019-01-01

## 2019-01-01 RX ORDER — FLUCONAZOLE 100 MG/1
200 TABLET ORAL EVERY 24 HOURS
Status: COMPLETED | OUTPATIENT
Start: 2019-01-01 | End: 2019-01-01

## 2019-01-01 RX ORDER — ACETAMINOPHEN 650 MG/1
SUPPOSITORY RECTAL
Status: COMPLETED
Start: 2019-01-01 | End: 2019-01-01

## 2019-01-01 RX ORDER — OLANZAPINE 10 MG/1
10 INJECTION, POWDER, LYOPHILIZED, FOR SOLUTION INTRAMUSCULAR EVERY 8 HOURS PRN
Status: DISCONTINUED | OUTPATIENT
Start: 2019-01-01 | End: 2020-01-01

## 2019-01-01 RX ORDER — INSULIN GLARGINE 100 [IU]/ML
15 INJECTION, SOLUTION SUBCUTANEOUS DAILY
Status: ON HOLD | COMMUNITY
End: 2019-01-01 | Stop reason: SDUPTHER

## 2019-01-01 RX ORDER — QUETIAPINE FUMARATE 25 MG/1
25 TABLET, FILM COATED ORAL EVERY 4 HOURS PRN
Status: DISCONTINUED | OUTPATIENT
Start: 2019-01-01 | End: 2019-01-01 | Stop reason: HOSPADM

## 2019-01-01 RX ORDER — LORAZEPAM 2 MG/ML
1 INJECTION INTRAMUSCULAR ONCE
Status: COMPLETED | OUTPATIENT
Start: 2019-01-01 | End: 2019-01-01

## 2019-01-01 RX ORDER — CEPHALEXIN 250 MG/1
250 CAPSULE ORAL 3 TIMES DAILY
Status: ON HOLD | COMMUNITY
End: 2019-01-01

## 2019-01-01 RX ORDER — INSULIN GLARGINE 100 [IU]/ML
8 INJECTION, SOLUTION SUBCUTANEOUS EVERY MORNING
Status: DISCONTINUED | OUTPATIENT
Start: 2019-01-01 | End: 2019-01-01

## 2019-01-01 RX ORDER — DIVALPROEX SODIUM 125 MG/1
500 CAPSULE, COATED PELLETS ORAL 3 TIMES DAILY
Status: DISCONTINUED | OUTPATIENT
Start: 2019-01-01 | End: 2020-01-01 | Stop reason: HOSPADM

## 2019-01-01 RX ORDER — NICOTINE POLACRILEX 4 MG
15 LOZENGE BUCCAL
Status: DISCONTINUED | OUTPATIENT
Start: 2019-01-01 | End: 2019-01-01

## 2019-01-01 RX ORDER — HALOPERIDOL 5 MG/ML
2 INJECTION INTRAMUSCULAR EVERY 6 HOURS PRN
Status: DISCONTINUED | OUTPATIENT
Start: 2019-01-01 | End: 2019-01-01

## 2019-01-01 RX ORDER — INSULIN GLARGINE 100 [IU]/ML
10 INJECTION, SOLUTION SUBCUTANEOUS EVERY MORNING
Status: DISCONTINUED | OUTPATIENT
Start: 2019-01-01 | End: 2019-01-01

## 2019-01-01 RX ORDER — NICOTINE POLACRILEX 4 MG
15 LOZENGE BUCCAL
Status: DISCONTINUED | OUTPATIENT
Start: 2019-01-01 | End: 2020-01-01

## 2019-01-01 RX ORDER — CEFTRIAXONE SODIUM 1 G/50ML
1 INJECTION, SOLUTION INTRAVENOUS EVERY 24 HOURS
Status: COMPLETED | OUTPATIENT
Start: 2019-01-01 | End: 2019-01-01

## 2019-01-01 RX ORDER — PROCHLORPERAZINE EDISYLATE 5 MG/ML
5 INJECTION INTRAMUSCULAR; INTRAVENOUS EVERY 4 HOURS PRN
Status: DISCONTINUED | OUTPATIENT
Start: 2019-01-01 | End: 2019-01-01 | Stop reason: HOSPADM

## 2019-01-01 RX ORDER — INSULIN GLARGINE 100 [IU]/ML
15 INJECTION, SOLUTION SUBCUTANEOUS DAILY
Status: DISCONTINUED | OUTPATIENT
Start: 2019-01-01 | End: 2019-01-01

## 2019-01-01 RX ORDER — THIAMINE MONONITRATE (VIT B1) 100 MG
100 TABLET ORAL 3 TIMES DAILY
COMMUNITY
End: 2019-01-01 | Stop reason: HOSPADM

## 2019-01-01 RX ORDER — ACETAMINOPHEN 325 MG/1
650 TABLET ORAL EVERY 6 HOURS PRN
Status: DISCONTINUED | OUTPATIENT
Start: 2019-01-01 | End: 2019-01-01 | Stop reason: HOSPADM

## 2019-01-01 RX ORDER — ACETAMINOPHEN 160 MG/5ML
325 SOLUTION ORAL EVERY 4 HOURS PRN
Status: DISCONTINUED | OUTPATIENT
Start: 2019-01-01 | End: 2019-01-01 | Stop reason: HOSPADM

## 2019-01-01 RX ORDER — TEMAZEPAM 15 MG/1
15 CAPSULE ORAL
COMMUNITY
End: 2019-01-01 | Stop reason: HOSPADM

## 2019-01-01 RX ORDER — OLANZAPINE 5 MG/1
5 TABLET, ORALLY DISINTEGRATING ORAL NIGHTLY
Status: DISCONTINUED | OUTPATIENT
Start: 2019-01-01 | End: 2019-01-01

## 2019-01-01 RX ORDER — POLYETHYLENE GLYCOL 3350 17 G/17G
17 POWDER, FOR SOLUTION ORAL DAILY PRN
COMMUNITY
End: 2019-01-01 | Stop reason: HOSPADM

## 2019-01-01 RX ORDER — HALOPERIDOL 5 MG/ML
2 INJECTION INTRAMUSCULAR EVERY 6 HOURS PRN
Status: DISCONTINUED | OUTPATIENT
Start: 2019-01-01 | End: 2019-01-01 | Stop reason: HOSPADM

## 2019-01-01 RX ORDER — DEXTROSE MONOHYDRATE 25 G/50ML
25 INJECTION, SOLUTION INTRAVENOUS
Status: DISCONTINUED | OUTPATIENT
Start: 2019-01-01 | End: 2019-01-01 | Stop reason: HOSPADM

## 2019-01-01 RX ORDER — INSULIN GLARGINE 100 [IU]/ML
10 INJECTION, SOLUTION SUBCUTANEOUS ONCE
Status: COMPLETED | OUTPATIENT
Start: 2019-01-01 | End: 2019-01-01

## 2019-01-01 RX ORDER — ZIPRASIDONE MESYLATE 20 MG/ML
10 INJECTION, POWDER, LYOPHILIZED, FOR SOLUTION INTRAMUSCULAR EVERY 12 HOURS PRN
COMMUNITY
End: 2019-01-01 | Stop reason: HOSPADM

## 2019-01-01 RX ORDER — OLANZAPINE 10 MG/1
5 INJECTION, POWDER, LYOPHILIZED, FOR SOLUTION INTRAMUSCULAR EVERY 8 HOURS PRN
Status: ON HOLD
Start: 2019-01-01 | End: 2019-01-01

## 2019-01-01 RX ORDER — ONDANSETRON 2 MG/ML
4 INJECTION INTRAMUSCULAR; INTRAVENOUS EVERY 6 HOURS PRN
Status: DISCONTINUED | OUTPATIENT
Start: 2019-01-01 | End: 2019-01-01 | Stop reason: HOSPADM

## 2019-01-01 RX ORDER — ACETAMINOPHEN 325 MG/1
650 TABLET ORAL EVERY 4 HOURS PRN
Status: DISCONTINUED | OUTPATIENT
Start: 2019-01-01 | End: 2019-01-01 | Stop reason: HOSPADM

## 2019-01-01 RX ORDER — ACETAMINOPHEN 325 MG/1
650 TABLET ORAL EVERY 6 HOURS PRN
COMMUNITY
End: 2019-01-01 | Stop reason: HOSPADM

## 2019-01-01 RX ORDER — DIVALPROEX SODIUM 125 MG/1
250 CAPSULE, COATED PELLETS ORAL EVERY 8 HOURS SCHEDULED
Status: DISCONTINUED | OUTPATIENT
Start: 2019-01-01 | End: 2019-01-01 | Stop reason: HOSPADM

## 2019-01-01 RX ORDER — QUETIAPINE FUMARATE 25 MG/1
25 TABLET, FILM COATED ORAL EVERY 4 HOURS PRN
Status: ON HOLD | COMMUNITY
End: 2020-01-01

## 2019-01-01 RX ORDER — INSULIN GLARGINE 100 [IU]/ML
20 INJECTION, SOLUTION SUBCUTANEOUS DAILY
Status: DISCONTINUED | OUTPATIENT
Start: 2019-01-01 | End: 2019-01-01 | Stop reason: HOSPADM

## 2019-01-01 RX ORDER — NICOTINE POLACRILEX 4 MG
15 LOZENGE BUCCAL
Status: DISCONTINUED | OUTPATIENT
Start: 2019-01-01 | End: 2019-01-01 | Stop reason: HOSPADM

## 2019-01-01 RX ORDER — INSULIN GLARGINE 100 [IU]/ML
15 INJECTION, SOLUTION SUBCUTANEOUS EVERY MORNING
Status: DISCONTINUED | OUTPATIENT
Start: 2019-01-01 | End: 2020-01-01

## 2019-01-01 RX ORDER — SODIUM CHLORIDE 0.9 % (FLUSH) 0.9 %
10 SYRINGE (ML) INJECTION AS NEEDED
Status: DISCONTINUED | OUTPATIENT
Start: 2019-01-01 | End: 2019-01-01 | Stop reason: HOSPADM

## 2019-01-01 RX ORDER — FOLIC ACID 1 MG/1
1 TABLET ORAL DAILY
COMMUNITY
End: 2019-01-01 | Stop reason: HOSPADM

## 2019-01-01 RX ORDER — LORAZEPAM 2 MG/ML
1 CONCENTRATE ORAL EVERY 6 HOURS PRN
COMMUNITY
End: 2019-01-01

## 2019-01-01 RX ORDER — OLANZAPINE 10 MG/1
5 INJECTION, POWDER, LYOPHILIZED, FOR SOLUTION INTRAMUSCULAR EVERY 8 HOURS PRN
Status: DISCONTINUED | OUTPATIENT
Start: 2019-01-01 | End: 2019-01-01 | Stop reason: HOSPADM

## 2019-01-01 RX ORDER — INSULIN GLARGINE 100 [IU]/ML
10 INJECTION, SOLUTION SUBCUTANEOUS EVERY MORNING
Status: DISCONTINUED | OUTPATIENT
Start: 2019-01-01 | End: 2019-01-01 | Stop reason: HOSPADM

## 2019-01-01 RX ORDER — AMOXICILLIN AND CLAVULANATE POTASSIUM 875; 125 MG/1; MG/1
1 TABLET, FILM COATED ORAL EVERY 12 HOURS SCHEDULED
Status: DISCONTINUED | OUTPATIENT
Start: 2019-01-01 | End: 2019-01-01

## 2019-01-01 RX ORDER — DIVALPROEX SODIUM 125 MG/1
250 TABLET, DELAYED RELEASE ORAL 3 TIMES DAILY
Status: ON HOLD | COMMUNITY
End: 2020-01-01

## 2019-01-01 RX ORDER — ACETAMINOPHEN 650 MG/1
975 SUPPOSITORY RECTAL ONCE
Status: COMPLETED | OUTPATIENT
Start: 2019-01-01 | End: 2019-01-01

## 2019-01-01 RX ORDER — ALUMINA, MAGNESIA, AND SIMETHICONE 2400; 2400; 240 MG/30ML; MG/30ML; MG/30ML
15 SUSPENSION ORAL EVERY 6 HOURS PRN
Status: DISCONTINUED | OUTPATIENT
Start: 2019-01-01 | End: 2020-01-01 | Stop reason: HOSPADM

## 2019-01-01 RX ORDER — FAMOTIDINE 20 MG/1
40 TABLET, FILM COATED ORAL 2 TIMES DAILY
Status: DISCONTINUED | OUTPATIENT
Start: 2019-01-01 | End: 2019-01-01 | Stop reason: HOSPADM

## 2019-01-01 RX ORDER — THIAMINE HCL 100 MG
100 TABLET ORAL DAILY
Status: DISCONTINUED | OUTPATIENT
Start: 2019-01-01 | End: 2019-01-01 | Stop reason: HOSPADM

## 2019-01-01 RX ORDER — LORAZEPAM 1 MG/1
1 TABLET ORAL
COMMUNITY
End: 2019-01-01 | Stop reason: HOSPADM

## 2019-01-01 RX ORDER — FOLIC ACID 1 MG/1
1 TABLET ORAL DAILY
Status: DISCONTINUED | OUTPATIENT
Start: 2019-01-01 | End: 2019-01-01 | Stop reason: HOSPADM

## 2019-01-01 RX ORDER — LOPERAMIDE HYDROCHLORIDE 2 MG/1
2 CAPSULE ORAL 4 TIMES DAILY PRN
COMMUNITY
End: 2019-01-01 | Stop reason: HOSPADM

## 2019-01-01 RX ORDER — ACETAMINOPHEN 650 MG/1
325 SUPPOSITORY RECTAL EVERY 4 HOURS PRN
Status: DISCONTINUED | OUTPATIENT
Start: 2019-01-01 | End: 2019-01-01 | Stop reason: HOSPADM

## 2019-01-01 RX ORDER — HEPARIN SODIUM 5000 [USP'U]/ML
5000 INJECTION, SOLUTION INTRAVENOUS; SUBCUTANEOUS EVERY 12 HOURS SCHEDULED
Status: DISCONTINUED | OUTPATIENT
Start: 2019-01-01 | End: 2019-01-01 | Stop reason: HOSPADM

## 2019-01-01 RX ADMIN — DEXTROSE MONOHYDRATE 25 G: 25 INJECTION, SOLUTION INTRAVENOUS at 18:03

## 2019-01-01 RX ADMIN — CEFTRIAXONE SODIUM 1 G: 1 INJECTION, SOLUTION INTRAVENOUS at 12:08

## 2019-01-01 RX ADMIN — METOPROLOL TARTRATE 12.5 MG: 25 TABLET, FILM COATED ORAL at 08:46

## 2019-01-01 RX ADMIN — QUETIAPINE 25 MG: 25 TABLET, FILM COATED ORAL at 11:54

## 2019-01-01 RX ADMIN — PIPERACILLIN AND TAZOBACTAM 3.38 G: 3; .375 INJECTION, POWDER, LYOPHILIZED, FOR SOLUTION INTRAVENOUS at 00:24

## 2019-01-01 RX ADMIN — FAMOTIDINE 40 MG: 20 TABLET ORAL at 09:16

## 2019-01-01 RX ADMIN — DIVALPROEX SODIUM 250 MG: 125 CAPSULE, COATED PELLETS ORAL at 09:25

## 2019-01-01 RX ADMIN — METOPROLOL TARTRATE 12.5 MG: 25 TABLET, FILM COATED ORAL at 08:30

## 2019-01-01 RX ADMIN — INSULIN GLARGINE 8 UNITS: 100 INJECTION, SOLUTION SUBCUTANEOUS at 06:09

## 2019-01-01 RX ADMIN — OLANZAPINE 10 MG: 10 INJECTION, POWDER, FOR SOLUTION INTRAMUSCULAR at 18:56

## 2019-01-01 RX ADMIN — LORAZEPAM 1 MG: 1 TABLET ORAL at 21:08

## 2019-01-01 RX ADMIN — QUETIAPINE FUMARATE 25 MG: 25 TABLET ORAL at 02:27

## 2019-01-01 RX ADMIN — INSULIN LISPRO 2 UNITS: 100 INJECTION, SOLUTION INTRAVENOUS; SUBCUTANEOUS at 13:04

## 2019-01-01 RX ADMIN — ACETAMINOPHEN 650 MG: 325 TABLET, FILM COATED ORAL at 07:50

## 2019-01-01 RX ADMIN — DIVALPROEX SODIUM 250 MG: 250 TABLET, DELAYED RELEASE ORAL at 08:01

## 2019-01-01 RX ADMIN — QUETIAPINE FUMARATE 25 MG: 25 TABLET ORAL at 13:14

## 2019-01-01 RX ADMIN — INSULIN LISPRO 3 UNITS: 100 INJECTION, SOLUTION INTRAVENOUS; SUBCUTANEOUS at 18:51

## 2019-01-01 RX ADMIN — Medication 10 ML: at 20:10

## 2019-01-01 RX ADMIN — Medication 10 ML: at 09:20

## 2019-01-01 RX ADMIN — DIVALPROEX SODIUM 250 MG: 250 TABLET, DELAYED RELEASE ORAL at 20:02

## 2019-01-01 RX ADMIN — PIPERACILLIN AND TAZOBACTAM 3.38 G: 3; .375 INJECTION, POWDER, LYOPHILIZED, FOR SOLUTION INTRAVENOUS at 08:07

## 2019-01-01 RX ADMIN — ACETAMINOPHEN 650 MG: 325 TABLET, FILM COATED ORAL at 20:32

## 2019-01-01 RX ADMIN — INSULIN LISPRO 2 UNITS: 100 INJECTION, SOLUTION INTRAVENOUS; SUBCUTANEOUS at 22:27

## 2019-01-01 RX ADMIN — DIVALPROEX SODIUM 250 MG: 125 CAPSULE, COATED PELLETS ORAL at 15:15

## 2019-01-01 RX ADMIN — INSULIN GLARGINE 20 UNITS: 100 INJECTION, SOLUTION SUBCUTANEOUS at 07:52

## 2019-01-01 RX ADMIN — FAMOTIDINE 40 MG: 20 TABLET ORAL at 22:14

## 2019-01-01 RX ADMIN — SODIUM CHLORIDE, PRESERVATIVE FREE 10 ML: 5 INJECTION INTRAVENOUS at 08:46

## 2019-01-01 RX ADMIN — HALOPERIDOL LACTATE 2 MG: 5 INJECTION, SOLUTION INTRAMUSCULAR at 19:55

## 2019-01-01 RX ADMIN — DIVALPROEX SODIUM 250 MG: 250 TABLET, DELAYED RELEASE ORAL at 08:54

## 2019-01-01 RX ADMIN — DIVALPROEX SODIUM 250 MG: 125 CAPSULE, COATED PELLETS ORAL at 08:53

## 2019-01-01 RX ADMIN — ACETAMINOPHEN 650 MG: 325 TABLET, FILM COATED ORAL at 03:20

## 2019-01-01 RX ADMIN — INSULIN LISPRO 3 UNITS: 100 INJECTION, SOLUTION INTRAVENOUS; SUBCUTANEOUS at 17:51

## 2019-01-01 RX ADMIN — HALOPERIDOL LACTATE 2 MG: 5 INJECTION, SOLUTION INTRAMUSCULAR at 10:28

## 2019-01-01 RX ADMIN — METOPROLOL TARTRATE 12.5 MG: 25 TABLET, FILM COATED ORAL at 09:19

## 2019-01-01 RX ADMIN — DIVALPROEX SODIUM 250 MG: 250 TABLET, DELAYED RELEASE ORAL at 20:09

## 2019-01-01 RX ADMIN — INSULIN GLARGINE 10 UNITS: 100 INJECTION, SOLUTION SUBCUTANEOUS at 21:43

## 2019-01-01 RX ADMIN — INSULIN LISPRO 4 UNITS: 100 INJECTION, SOLUTION INTRAVENOUS; SUBCUTANEOUS at 17:14

## 2019-01-01 RX ADMIN — QUETIAPINE 25 MG: 25 TABLET, FILM COATED ORAL at 14:12

## 2019-01-01 RX ADMIN — ACETAMINOPHEN 650 MG: 325 TABLET, FILM COATED ORAL at 01:09

## 2019-01-01 RX ADMIN — DIVALPROEX SODIUM 250 MG: 125 CAPSULE, COATED PELLETS ORAL at 06:09

## 2019-01-01 RX ADMIN — DIVALPROEX SODIUM 500 MG: 125 CAPSULE, COATED PELLETS ORAL at 21:39

## 2019-01-01 RX ADMIN — QUETIAPINE FUMARATE 25 MG: 25 TABLET ORAL at 07:59

## 2019-01-01 RX ADMIN — INSULIN LISPRO 3 UNITS: 100 INJECTION, SOLUTION INTRAVENOUS; SUBCUTANEOUS at 09:52

## 2019-01-01 RX ADMIN — QUETIAPINE FUMARATE 25 MG: 25 TABLET ORAL at 21:31

## 2019-01-01 RX ADMIN — INSULIN LISPRO 2 UNITS: 100 INJECTION, SOLUTION INTRAVENOUS; SUBCUTANEOUS at 08:46

## 2019-01-01 RX ADMIN — Medication 10 ML: at 09:17

## 2019-01-01 RX ADMIN — FAMOTIDINE 40 MG: 20 TABLET ORAL at 09:20

## 2019-01-01 RX ADMIN — INSULIN LISPRO 3 UNITS: 100 INJECTION, SOLUTION INTRAVENOUS; SUBCUTANEOUS at 08:54

## 2019-01-01 RX ADMIN — CEFEPIME HYDROCHLORIDE 2 G: 2 INJECTION, POWDER, FOR SOLUTION INTRAVENOUS at 16:14

## 2019-01-01 RX ADMIN — INSULIN GLARGINE 10 UNITS: 100 INJECTION, SOLUTION SUBCUTANEOUS at 08:00

## 2019-01-01 RX ADMIN — HEPARIN SODIUM 5000 UNITS: 5000 INJECTION INTRAVENOUS; SUBCUTANEOUS at 09:19

## 2019-01-01 RX ADMIN — FOLIC ACID 1 MG: 1 TABLET ORAL at 08:46

## 2019-01-01 RX ADMIN — HALOPERIDOL LACTATE 2 MG: 5 INJECTION, SOLUTION INTRAMUSCULAR at 18:44

## 2019-01-01 RX ADMIN — QUETIAPINE FUMARATE 25 MG: 25 TABLET ORAL at 21:36

## 2019-01-01 RX ADMIN — DIVALPROEX SODIUM 500 MG: 125 CAPSULE, COATED PELLETS ORAL at 15:06

## 2019-01-01 RX ADMIN — METOPROLOL TARTRATE 12.5 MG: 25 TABLET ORAL at 09:33

## 2019-01-01 RX ADMIN — IPRATROPIUM BROMIDE AND ALBUTEROL SULFATE 3 ML: .5; 3 SOLUTION RESPIRATORY (INHALATION) at 14:22

## 2019-01-01 RX ADMIN — DIVALPROEX SODIUM 250 MG: 250 TABLET, DELAYED RELEASE ORAL at 16:06

## 2019-01-01 RX ADMIN — Medication 10 ML: at 08:08

## 2019-01-01 RX ADMIN — ACETAMINOPHEN 975 MG: 650 SUPPOSITORY RECTAL at 14:45

## 2019-01-01 RX ADMIN — HEPARIN SODIUM 5000 UNITS: 5000 INJECTION INTRAVENOUS; SUBCUTANEOUS at 20:09

## 2019-01-01 RX ADMIN — LORAZEPAM 1 MG: 1 TABLET ORAL at 20:09

## 2019-01-01 RX ADMIN — ACETAMINOPHEN 650 MG: 325 TABLET, FILM COATED ORAL at 17:52

## 2019-01-01 RX ADMIN — LORAZEPAM 1 MG: 1 TABLET ORAL at 21:10

## 2019-01-01 RX ADMIN — INSULIN LISPRO 3 UNITS: 100 INJECTION, SOLUTION INTRAVENOUS; SUBCUTANEOUS at 11:51

## 2019-01-01 RX ADMIN — LORAZEPAM 1 MG: 1 TABLET ORAL at 21:49

## 2019-01-01 RX ADMIN — FAMOTIDINE 40 MG: 20 TABLET ORAL at 09:53

## 2019-01-01 RX ADMIN — METOPROLOL TARTRATE 12.5 MG: 25 TABLET, FILM COATED ORAL at 21:08

## 2019-01-01 RX ADMIN — PIPERACILLIN AND TAZOBACTAM 3.38 G: 3; .375 INJECTION, POWDER, LYOPHILIZED, FOR SOLUTION INTRAVENOUS at 08:16

## 2019-01-01 RX ADMIN — Medication 10 ML: at 21:28

## 2019-01-01 RX ADMIN — INSULIN LISPRO 3 UNITS: 100 INJECTION, SOLUTION INTRAVENOUS; SUBCUTANEOUS at 12:33

## 2019-01-01 RX ADMIN — INSULIN LISPRO 3 UNITS: 100 INJECTION, SOLUTION INTRAVENOUS; SUBCUTANEOUS at 07:51

## 2019-01-01 RX ADMIN — INSULIN GLARGINE 25 UNITS: 100 INJECTION, SOLUTION SUBCUTANEOUS at 10:06

## 2019-01-01 RX ADMIN — INSULIN LISPRO 3 UNITS: 100 INJECTION, SOLUTION INTRAVENOUS; SUBCUTANEOUS at 18:05

## 2019-01-01 RX ADMIN — HALOPERIDOL LACTATE 2 MG: 5 INJECTION, SOLUTION INTRAMUSCULAR at 21:37

## 2019-01-01 RX ADMIN — INSULIN GLARGINE 15 UNITS: 100 INJECTION, SOLUTION SUBCUTANEOUS at 08:54

## 2019-01-01 RX ADMIN — DIVALPROEX SODIUM 250 MG: 125 CAPSULE, COATED PELLETS ORAL at 17:01

## 2019-01-01 RX ADMIN — INSULIN LISPRO 3 UNITS: 100 INJECTION, SOLUTION INTRAVENOUS; SUBCUTANEOUS at 17:45

## 2019-01-01 RX ADMIN — HEPARIN SODIUM 5000 UNITS: 5000 INJECTION INTRAVENOUS; SUBCUTANEOUS at 08:47

## 2019-01-01 RX ADMIN — INSULIN GLARGINE 30 UNITS: 100 INJECTION, SOLUTION SUBCUTANEOUS at 21:50

## 2019-01-01 RX ADMIN — HEPARIN SODIUM 5000 UNITS: 5000 INJECTION INTRAVENOUS; SUBCUTANEOUS at 09:54

## 2019-01-01 RX ADMIN — METOPROLOL TARTRATE 12.5 MG: 25 TABLET ORAL at 21:06

## 2019-01-01 RX ADMIN — DIVALPROEX SODIUM 250 MG: 250 TABLET, DELAYED RELEASE ORAL at 08:46

## 2019-01-01 RX ADMIN — DEXTROSE 50 % IN WATER (D50W) INTRAVENOUS SYRINGE 25 G: at 04:09

## 2019-01-01 RX ADMIN — DIVALPROEX SODIUM 250 MG: 125 CAPSULE, COATED PELLETS ORAL at 20:54

## 2019-01-01 RX ADMIN — OLANZAPINE 10 MG: 10 INJECTION, POWDER, FOR SOLUTION INTRAMUSCULAR at 01:55

## 2019-01-01 RX ADMIN — LORAZEPAM 1 MG: 2 INJECTION INTRAMUSCULAR; INTRAVENOUS at 21:35

## 2019-01-01 RX ADMIN — HALOPERIDOL LACTATE 2 MG: 5 INJECTION, SOLUTION INTRAMUSCULAR at 18:56

## 2019-01-01 RX ADMIN — FAMOTIDINE 40 MG: 20 TABLET ORAL at 20:09

## 2019-01-01 RX ADMIN — INSULIN GLARGINE 15 UNITS: 100 INJECTION, SOLUTION SUBCUTANEOUS at 09:04

## 2019-01-01 RX ADMIN — DIVALPROEX SODIUM 250 MG: 250 TABLET, DELAYED RELEASE ORAL at 08:17

## 2019-01-01 RX ADMIN — Medication 10 ML: at 20:27

## 2019-01-01 RX ADMIN — DIVALPROEX SODIUM 250 MG: 250 TABLET, DELAYED RELEASE ORAL at 15:47

## 2019-01-01 RX ADMIN — QUETIAPINE FUMARATE 25 MG: 25 TABLET ORAL at 12:28

## 2019-01-01 RX ADMIN — INSULIN LISPRO 3 UNITS: 100 INJECTION, SOLUTION INTRAVENOUS; SUBCUTANEOUS at 16:47

## 2019-01-01 RX ADMIN — DIVALPROEX SODIUM 250 MG: 250 TABLET, DELAYED RELEASE ORAL at 08:48

## 2019-01-01 RX ADMIN — INSULIN GLARGINE 8 UNITS: 100 INJECTION, SOLUTION SUBCUTANEOUS at 08:50

## 2019-01-01 RX ADMIN — PIPERACILLIN AND TAZOBACTAM 3.38 G: 3; .375 INJECTION, POWDER, LYOPHILIZED, FOR SOLUTION INTRAVENOUS at 00:58

## 2019-01-01 RX ADMIN — HEPARIN SODIUM 5000 UNITS: 5000 INJECTION INTRAVENOUS; SUBCUTANEOUS at 22:14

## 2019-01-01 RX ADMIN — INSULIN GLARGINE 15 UNITS: 100 INJECTION, SOLUTION SUBCUTANEOUS at 09:08

## 2019-01-01 RX ADMIN — INSULIN LISPRO 10 UNITS: 100 INJECTION, SOLUTION INTRAVENOUS; SUBCUTANEOUS at 21:43

## 2019-01-01 RX ADMIN — INSULIN LISPRO 4 UNITS: 100 INJECTION, SOLUTION INTRAVENOUS; SUBCUTANEOUS at 17:26

## 2019-01-01 RX ADMIN — Medication 10 ML: at 20:43

## 2019-01-01 RX ADMIN — DIVALPROEX SODIUM 250 MG: 125 CAPSULE, COATED PELLETS ORAL at 06:28

## 2019-01-01 RX ADMIN — HALOPERIDOL LACTATE 2 MG: 5 INJECTION, SOLUTION INTRAMUSCULAR at 00:24

## 2019-01-01 RX ADMIN — Medication 10 ML: at 10:18

## 2019-01-01 RX ADMIN — HALOPERIDOL LACTATE 2 MG: 5 INJECTION, SOLUTION INTRAMUSCULAR at 15:47

## 2019-01-01 RX ADMIN — QUETIAPINE FUMARATE 25 MG: 25 TABLET ORAL at 23:10

## 2019-01-01 RX ADMIN — INSULIN LISPRO 3 UNITS: 100 INJECTION, SOLUTION INTRAVENOUS; SUBCUTANEOUS at 13:20

## 2019-01-01 RX ADMIN — CEFEPIME HYDROCHLORIDE 2 G: 2 INJECTION, POWDER, FOR SOLUTION INTRAVENOUS at 00:30

## 2019-01-01 RX ADMIN — Medication 100 MG: at 10:12

## 2019-01-01 RX ADMIN — INSULIN LISPRO 2 UNITS: 100 INJECTION, SOLUTION INTRAVENOUS; SUBCUTANEOUS at 20:16

## 2019-01-01 RX ADMIN — HEPARIN SODIUM 5000 UNITS: 5000 INJECTION INTRAVENOUS; SUBCUTANEOUS at 21:06

## 2019-01-01 RX ADMIN — DIVALPROEX SODIUM 250 MG: 125 CAPSULE, COATED PELLETS ORAL at 17:45

## 2019-01-01 RX ADMIN — INSULIN LISPRO 7 UNITS: 100 INJECTION, SOLUTION INTRAVENOUS; SUBCUTANEOUS at 21:58

## 2019-01-01 RX ADMIN — DIVALPROEX SODIUM 250 MG: 250 TABLET, DELAYED RELEASE ORAL at 08:58

## 2019-01-01 RX ADMIN — LORAZEPAM 1 MG: 1 TABLET ORAL at 22:00

## 2019-01-01 RX ADMIN — DIVALPROEX SODIUM 250 MG: 250 TABLET, DELAYED RELEASE ORAL at 17:26

## 2019-01-01 RX ADMIN — Medication 10 ML: at 21:51

## 2019-01-01 RX ADMIN — DIVALPROEX SODIUM 250 MG: 250 TABLET, DELAYED RELEASE ORAL at 09:16

## 2019-01-01 RX ADMIN — HEPARIN SODIUM 5000 UNITS: 5000 INJECTION INTRAVENOUS; SUBCUTANEOUS at 21:11

## 2019-01-01 RX ADMIN — HEPARIN SODIUM 5000 UNITS: 5000 INJECTION INTRAVENOUS; SUBCUTANEOUS at 20:02

## 2019-01-01 RX ADMIN — INSULIN LISPRO 4 UNITS: 100 INJECTION, SOLUTION INTRAVENOUS; SUBCUTANEOUS at 21:30

## 2019-01-01 RX ADMIN — DIVALPROEX SODIUM 250 MG: 125 CAPSULE, COATED PELLETS ORAL at 20:38

## 2019-01-01 RX ADMIN — INSULIN LISPRO 3 UNITS: 100 INJECTION, SOLUTION INTRAVENOUS; SUBCUTANEOUS at 13:02

## 2019-01-01 RX ADMIN — FAMOTIDINE 40 MG: 20 TABLET ORAL at 21:06

## 2019-01-01 RX ADMIN — FAMOTIDINE 40 MG: 20 TABLET ORAL at 09:02

## 2019-01-01 RX ADMIN — Medication 100 MG: at 08:07

## 2019-01-01 RX ADMIN — METOPROLOL TARTRATE 12.5 MG: 25 TABLET ORAL at 12:05

## 2019-01-01 RX ADMIN — FAMOTIDINE 40 MG: 20 TABLET ORAL at 08:30

## 2019-01-01 RX ADMIN — DIVALPROEX SODIUM 250 MG: 125 CAPSULE, COATED PELLETS ORAL at 21:02

## 2019-01-01 RX ADMIN — FAMOTIDINE 40 MG: 20 TABLET ORAL at 20:22

## 2019-01-01 RX ADMIN — Medication 10 ML: at 08:48

## 2019-01-01 RX ADMIN — INSULIN GLARGINE 10 UNITS: 100 INJECTION, SOLUTION SUBCUTANEOUS at 08:46

## 2019-01-01 RX ADMIN — Medication 10 ML: at 21:07

## 2019-01-01 RX ADMIN — METOPROLOL TARTRATE 12.5 MG: 25 TABLET, FILM COATED ORAL at 21:28

## 2019-01-01 RX ADMIN — METOPROLOL TARTRATE 12.5 MG: 25 TABLET ORAL at 21:02

## 2019-01-01 RX ADMIN — HALOPERIDOL LACTATE 2 MG: 5 INJECTION, SOLUTION INTRAMUSCULAR at 08:19

## 2019-01-01 RX ADMIN — METOPROLOL TARTRATE 12.5 MG: 25 TABLET, FILM COATED ORAL at 09:16

## 2019-01-01 RX ADMIN — ACETAMINOPHEN 650 MG: 325 TABLET, FILM COATED ORAL at 17:55

## 2019-01-01 RX ADMIN — SODIUM CHLORIDE, PRESERVATIVE FREE 10 ML: 5 INJECTION INTRAVENOUS at 08:00

## 2019-01-01 RX ADMIN — INSULIN LISPRO 2 UNITS: 100 INJECTION, SOLUTION INTRAVENOUS; SUBCUTANEOUS at 20:38

## 2019-01-01 RX ADMIN — HEPARIN SODIUM 5000 UNITS: 5000 INJECTION INTRAVENOUS; SUBCUTANEOUS at 08:32

## 2019-01-01 RX ADMIN — DIVALPROEX SODIUM 250 MG: 125 CAPSULE, COATED PELLETS ORAL at 06:00

## 2019-01-01 RX ADMIN — HEPARIN SODIUM 5000 UNITS: 5000 INJECTION INTRAVENOUS; SUBCUTANEOUS at 10:11

## 2019-01-01 RX ADMIN — QUETIAPINE FUMARATE 25 MG: 25 TABLET ORAL at 07:25

## 2019-01-01 RX ADMIN — DIVALPROEX SODIUM 250 MG: 125 CAPSULE, COATED PELLETS ORAL at 08:44

## 2019-01-01 RX ADMIN — DIVALPROEX SODIUM 250 MG: 250 TABLET, DELAYED RELEASE ORAL at 09:02

## 2019-01-01 RX ADMIN — INSULIN LISPRO 3 UNITS: 100 INJECTION, SOLUTION INTRAVENOUS; SUBCUTANEOUS at 09:24

## 2019-01-01 RX ADMIN — DIVALPROEX SODIUM 250 MG: 250 TABLET, DELAYED RELEASE ORAL at 07:25

## 2019-01-01 RX ADMIN — INSULIN LISPRO 3 UNITS: 100 INJECTION, SOLUTION INTRAVENOUS; SUBCUTANEOUS at 17:32

## 2019-01-01 RX ADMIN — AMOXICILLIN AND CLAVULANATE POTASSIUM 1 TABLET: 875; 125 TABLET, FILM COATED ORAL at 20:42

## 2019-01-01 RX ADMIN — METOPROLOL TARTRATE 12.5 MG: 25 TABLET ORAL at 20:56

## 2019-01-01 RX ADMIN — INSULIN GLARGINE 10 UNITS: 100 INJECTION, SOLUTION SUBCUTANEOUS at 06:10

## 2019-01-01 RX ADMIN — ACETAMINOPHEN 650 MG: 325 TABLET, FILM COATED ORAL at 06:40

## 2019-01-01 RX ADMIN — Medication 10 ML: at 08:17

## 2019-01-01 RX ADMIN — HEPARIN SODIUM 5000 UNITS: 5000 INJECTION INTRAVENOUS; SUBCUTANEOUS at 08:30

## 2019-01-01 RX ADMIN — OLANZAPINE 5 MG: 5 TABLET, ORALLY DISINTEGRATING ORAL at 08:40

## 2019-01-01 RX ADMIN — DIVALPROEX SODIUM 250 MG: 125 CAPSULE, COATED PELLETS ORAL at 06:18

## 2019-01-01 RX ADMIN — SODIUM CHLORIDE, PRESERVATIVE FREE 10 ML: 5 INJECTION INTRAVENOUS at 10:40

## 2019-01-01 RX ADMIN — FOLIC ACID 1 MG: 1 TABLET ORAL at 10:12

## 2019-01-01 RX ADMIN — INSULIN LISPRO 3 UNITS: 100 INJECTION, SOLUTION INTRAVENOUS; SUBCUTANEOUS at 13:35

## 2019-01-01 RX ADMIN — DIVALPROEX SODIUM 250 MG: 125 CAPSULE, COATED PELLETS ORAL at 21:09

## 2019-01-01 RX ADMIN — Medication 10 ML: at 20:11

## 2019-01-01 RX ADMIN — DIVALPROEX SODIUM 250 MG: 125 CAPSULE, COATED PELLETS ORAL at 22:23

## 2019-01-01 RX ADMIN — DIVALPROEX SODIUM 500 MG: 125 CAPSULE, COATED PELLETS ORAL at 20:45

## 2019-01-01 RX ADMIN — INSULIN LISPRO 8 UNITS: 100 INJECTION, SOLUTION INTRAVENOUS; SUBCUTANEOUS at 21:21

## 2019-01-01 RX ADMIN — METOPROLOL TARTRATE 12.5 MG: 25 TABLET ORAL at 08:54

## 2019-01-01 RX ADMIN — INSULIN GLARGINE 15 UNITS: 100 INJECTION, SOLUTION SUBCUTANEOUS at 10:36

## 2019-01-01 RX ADMIN — DIVALPROEX SODIUM 250 MG: 125 CAPSULE, COATED PELLETS ORAL at 06:11

## 2019-01-01 RX ADMIN — INSULIN LISPRO 6 UNITS: 100 INJECTION, SOLUTION INTRAVENOUS; SUBCUTANEOUS at 22:17

## 2019-01-01 RX ADMIN — Medication 10 ML: at 22:15

## 2019-01-01 RX ADMIN — DIVALPROEX SODIUM 250 MG: 250 TABLET, DELAYED RELEASE ORAL at 20:03

## 2019-01-01 RX ADMIN — ACETAMINOPHEN 650 MG: 325 TABLET, FILM COATED ORAL at 05:57

## 2019-01-01 RX ADMIN — Medication 100 MG: at 09:19

## 2019-01-01 RX ADMIN — INSULIN GLARGINE 15 UNITS: 100 INJECTION, SOLUTION SUBCUTANEOUS at 09:32

## 2019-01-01 RX ADMIN — METOPROLOL TARTRATE 12.5 MG: 25 TABLET, FILM COATED ORAL at 21:49

## 2019-01-01 RX ADMIN — HEPARIN SODIUM 5000 UNITS: 5000 INJECTION INTRAVENOUS; SUBCUTANEOUS at 21:29

## 2019-01-01 RX ADMIN — INSULIN GLARGINE 10 UNITS: 100 INJECTION, SOLUTION SUBCUTANEOUS at 06:00

## 2019-01-01 RX ADMIN — METOPROLOL TARTRATE 12.5 MG: 25 TABLET ORAL at 21:33

## 2019-01-01 RX ADMIN — FOLIC ACID 1 MG: 1 TABLET ORAL at 09:19

## 2019-01-01 RX ADMIN — OLANZAPINE 5 MG: 5 TABLET, ORALLY DISINTEGRATING ORAL at 20:48

## 2019-01-01 RX ADMIN — DEXTROSE 50 % IN WATER (D50W) INTRAVENOUS SYRINGE 25 G: at 07:24

## 2019-01-01 RX ADMIN — ACETAMINOPHEN 650 MG: 325 TABLET, FILM COATED ORAL at 21:03

## 2019-01-01 RX ADMIN — INSULIN LISPRO 3 UNITS: 100 INJECTION, SOLUTION INTRAVENOUS; SUBCUTANEOUS at 09:25

## 2019-01-01 RX ADMIN — ACETAMINOPHEN 650 MG: 325 TABLET, FILM COATED ORAL at 12:08

## 2019-01-01 RX ADMIN — QUETIAPINE FUMARATE 25 MG: 25 TABLET ORAL at 17:37

## 2019-01-01 RX ADMIN — METOPROLOL TARTRATE 12.5 MG: 25 TABLET ORAL at 08:46

## 2019-01-01 RX ADMIN — ACETAMINOPHEN 650 MG: 325 TABLET, FILM COATED ORAL at 21:43

## 2019-01-01 RX ADMIN — QUETIAPINE FUMARATE 25 MG: 25 TABLET ORAL at 15:45

## 2019-01-01 RX ADMIN — AMOXICILLIN AND CLAVULANATE POTASSIUM 1 TABLET: 875; 125 TABLET, FILM COATED ORAL at 09:53

## 2019-01-01 RX ADMIN — HEPARIN SODIUM 5000 UNITS: 5000 INJECTION INTRAVENOUS; SUBCUTANEOUS at 21:07

## 2019-01-01 RX ADMIN — INSULIN LISPRO 5 UNITS: 100 INJECTION, SOLUTION INTRAVENOUS; SUBCUTANEOUS at 23:08

## 2019-01-01 RX ADMIN — PIPERACILLIN AND TAZOBACTAM 3.38 G: 3; .375 INJECTION, POWDER, LYOPHILIZED, FOR SOLUTION INTRAVENOUS at 15:45

## 2019-01-01 RX ADMIN — Medication 10 ML: at 09:16

## 2019-01-01 RX ADMIN — INSULIN LISPRO 3 UNITS: 100 INJECTION, SOLUTION INTRAVENOUS; SUBCUTANEOUS at 07:50

## 2019-01-01 RX ADMIN — ZIPRASIDONE MESYLATE 10 MG: 20 INJECTION, POWDER, LYOPHILIZED, FOR SOLUTION INTRAMUSCULAR at 23:22

## 2019-01-01 RX ADMIN — DIVALPROEX SODIUM 250 MG: 125 CAPSULE, COATED PELLETS ORAL at 06:14

## 2019-01-01 RX ADMIN — FOLIC ACID 1 MG: 1 TABLET ORAL at 09:16

## 2019-01-01 RX ADMIN — OLANZAPINE 5 MG: 5 TABLET, ORALLY DISINTEGRATING ORAL at 20:59

## 2019-01-01 RX ADMIN — INSULIN LISPRO 5 UNITS: 100 INJECTION, SOLUTION INTRAVENOUS; SUBCUTANEOUS at 20:57

## 2019-01-01 RX ADMIN — LORAZEPAM 1 MG: 1 TABLET ORAL at 20:42

## 2019-01-01 RX ADMIN — OLANZAPINE 5 MG: 5 TABLET, ORALLY DISINTEGRATING ORAL at 21:02

## 2019-01-01 RX ADMIN — DIVALPROEX SODIUM 250 MG: 125 CAPSULE, COATED PELLETS ORAL at 16:09

## 2019-01-01 RX ADMIN — INSULIN LISPRO 4 UNITS: 100 INJECTION, SOLUTION INTRAVENOUS; SUBCUTANEOUS at 13:36

## 2019-01-01 RX ADMIN — INSULIN LISPRO 7 UNITS: 100 INJECTION, SOLUTION INTRAVENOUS; SUBCUTANEOUS at 06:04

## 2019-01-01 RX ADMIN — QUETIAPINE FUMARATE 25 MG: 25 TABLET ORAL at 17:51

## 2019-01-01 RX ADMIN — QUETIAPINE FUMARATE 25 MG: 25 TABLET ORAL at 11:38

## 2019-01-01 RX ADMIN — AMOXICILLIN AND CLAVULANATE POTASSIUM 1 TABLET: 875; 125 TABLET, FILM COATED ORAL at 20:02

## 2019-01-01 RX ADMIN — QUETIAPINE FUMARATE 25 MG: 25 TABLET ORAL at 20:03

## 2019-01-01 RX ADMIN — METOPROLOL TARTRATE 12.5 MG: 25 TABLET ORAL at 20:45

## 2019-01-01 RX ADMIN — FLUCONAZOLE 200 MG: 100 TABLET ORAL at 08:45

## 2019-01-01 RX ADMIN — DIVALPROEX SODIUM 250 MG: 125 CAPSULE, COATED PELLETS ORAL at 20:31

## 2019-01-01 RX ADMIN — DIVALPROEX SODIUM 250 MG: 250 TABLET, DELAYED RELEASE ORAL at 15:45

## 2019-01-01 RX ADMIN — QUETIAPINE FUMARATE 25 MG: 25 TABLET ORAL at 21:03

## 2019-01-01 RX ADMIN — INSULIN LISPRO 3 UNITS: 100 INJECTION, SOLUTION INTRAVENOUS; SUBCUTANEOUS at 09:01

## 2019-01-01 RX ADMIN — INSULIN GLARGINE 30 UNITS: 100 INJECTION, SOLUTION SUBCUTANEOUS at 22:27

## 2019-01-01 RX ADMIN — METOPROLOL TARTRATE 12.5 MG: 25 TABLET ORAL at 09:52

## 2019-01-01 RX ADMIN — INSULIN LISPRO 2 UNITS: 100 INJECTION, SOLUTION INTRAVENOUS; SUBCUTANEOUS at 10:32

## 2019-01-01 RX ADMIN — FOLIC ACID 1 MG: 1 TABLET ORAL at 08:30

## 2019-01-01 RX ADMIN — METOPROLOL TARTRATE 12.5 MG: 25 TABLET ORAL at 09:16

## 2019-01-01 RX ADMIN — METOPROLOL TARTRATE 12.5 MG: 25 TABLET, FILM COATED ORAL at 09:53

## 2019-01-01 RX ADMIN — DIVALPROEX SODIUM 250 MG: 125 CAPSULE, COATED PELLETS ORAL at 16:44

## 2019-01-01 RX ADMIN — HALOPERIDOL LACTATE 2 MG: 5 INJECTION, SOLUTION INTRAMUSCULAR at 04:47

## 2019-01-01 RX ADMIN — INSULIN LISPRO 2 UNITS: 100 INJECTION, SOLUTION INTRAVENOUS; SUBCUTANEOUS at 20:43

## 2019-01-01 RX ADMIN — ACETAMINOPHEN 650 MG: 325 TABLET, FILM COATED ORAL at 11:38

## 2019-01-01 RX ADMIN — HALOPERIDOL LACTATE 2 MG: 5 INJECTION, SOLUTION INTRAMUSCULAR at 10:43

## 2019-01-01 RX ADMIN — HALOPERIDOL LACTATE 2 MG: 5 INJECTION INTRAMUSCULAR at 02:57

## 2019-01-01 RX ADMIN — METOPROLOL TARTRATE 12.5 MG: 25 TABLET, FILM COATED ORAL at 08:07

## 2019-01-01 RX ADMIN — SODIUM CHLORIDE, PRESERVATIVE FREE 10 ML: 5 INJECTION INTRAVENOUS at 21:34

## 2019-01-01 RX ADMIN — INSULIN LISPRO 3 UNITS: 100 INJECTION, SOLUTION INTRAVENOUS; SUBCUTANEOUS at 11:50

## 2019-01-01 RX ADMIN — METOPROLOL TARTRATE 12.5 MG: 25 TABLET, FILM COATED ORAL at 09:06

## 2019-01-01 RX ADMIN — HEPARIN SODIUM 5000 UNITS: 5000 INJECTION INTRAVENOUS; SUBCUTANEOUS at 20:16

## 2019-01-01 RX ADMIN — HEPARIN SODIUM 5000 UNITS: 5000 INJECTION INTRAVENOUS; SUBCUTANEOUS at 09:07

## 2019-01-01 RX ADMIN — DIVALPROEX SODIUM 250 MG: 125 CAPSULE, COATED PELLETS ORAL at 13:22

## 2019-01-01 RX ADMIN — INSULIN GLARGINE 15 UNITS: 100 INJECTION, SOLUTION SUBCUTANEOUS at 09:09

## 2019-01-01 RX ADMIN — INSULIN LISPRO 2 UNITS: 100 INJECTION, SOLUTION INTRAVENOUS; SUBCUTANEOUS at 12:02

## 2019-01-01 RX ADMIN — FAMOTIDINE 40 MG: 20 TABLET ORAL at 21:09

## 2019-01-01 RX ADMIN — FAMOTIDINE 40 MG: 20 TABLET ORAL at 08:48

## 2019-01-01 RX ADMIN — DEXTROSE 50 % IN WATER (D50W) INTRAVENOUS SYRINGE 25 G: at 12:36

## 2019-01-01 RX ADMIN — METOPROLOL TARTRATE 12.5 MG: 25 TABLET ORAL at 08:57

## 2019-01-01 RX ADMIN — QUETIAPINE 25 MG: 25 TABLET, FILM COATED ORAL at 18:13

## 2019-01-01 RX ADMIN — DIVALPROEX SODIUM 250 MG: 250 TABLET, DELAYED RELEASE ORAL at 08:07

## 2019-01-01 RX ADMIN — HEPARIN SODIUM 5000 UNITS: 5000 INJECTION INTRAVENOUS; SUBCUTANEOUS at 22:26

## 2019-01-01 RX ADMIN — INSULIN LISPRO 5 UNITS: 100 INJECTION, SOLUTION INTRAVENOUS; SUBCUTANEOUS at 17:32

## 2019-01-01 RX ADMIN — INSULIN LISPRO 3 UNITS: 100 INJECTION, SOLUTION INTRAVENOUS; SUBCUTANEOUS at 17:53

## 2019-01-01 RX ADMIN — INSULIN LISPRO 3 UNITS: 100 INJECTION, SOLUTION INTRAVENOUS; SUBCUTANEOUS at 12:26

## 2019-01-01 RX ADMIN — INSULIN LISPRO 5 UNITS: 100 INJECTION, SOLUTION INTRAVENOUS; SUBCUTANEOUS at 12:12

## 2019-01-01 RX ADMIN — HEPARIN SODIUM 5000 UNITS: 5000 INJECTION INTRAVENOUS; SUBCUTANEOUS at 08:45

## 2019-01-01 RX ADMIN — CEFTRIAXONE SODIUM 2 G: 2 INJECTION, SOLUTION INTRAVENOUS at 01:59

## 2019-01-01 RX ADMIN — INSULIN LISPRO 2 UNITS: 100 INJECTION, SOLUTION INTRAVENOUS; SUBCUTANEOUS at 12:53

## 2019-01-01 RX ADMIN — INSULIN GLARGINE 20 UNITS: 100 INJECTION, SOLUTION SUBCUTANEOUS at 08:53

## 2019-01-01 RX ADMIN — FAMOTIDINE 40 MG: 20 TABLET ORAL at 08:44

## 2019-01-01 RX ADMIN — INSULIN LISPRO 2 UNITS: 100 INJECTION, SOLUTION INTRAVENOUS; SUBCUTANEOUS at 18:50

## 2019-01-01 RX ADMIN — DIVALPROEX SODIUM 250 MG: 125 CAPSULE, COATED PELLETS ORAL at 22:03

## 2019-01-01 RX ADMIN — INSULIN LISPRO 7 UNITS: 100 INJECTION, SOLUTION INTRAVENOUS; SUBCUTANEOUS at 21:49

## 2019-01-01 RX ADMIN — Medication 100 MG: at 09:02

## 2019-01-01 RX ADMIN — DIVALPROEX SODIUM 250 MG: 250 TABLET, DELAYED RELEASE ORAL at 21:43

## 2019-01-01 RX ADMIN — METOPROLOL TARTRATE 12.5 MG: 25 TABLET ORAL at 20:38

## 2019-01-01 RX ADMIN — QUETIAPINE FUMARATE 25 MG: 25 TABLET ORAL at 13:40

## 2019-01-01 RX ADMIN — INSULIN LISPRO 3 UNITS: 100 INJECTION, SOLUTION INTRAVENOUS; SUBCUTANEOUS at 12:29

## 2019-01-01 RX ADMIN — SODIUM CHLORIDE, PRESERVATIVE FREE 10 ML: 5 INJECTION INTRAVENOUS at 10:00

## 2019-01-01 RX ADMIN — HEPARIN SODIUM 5000 UNITS: 5000 INJECTION INTRAVENOUS; SUBCUTANEOUS at 22:00

## 2019-01-01 RX ADMIN — DIVALPROEX SODIUM 250 MG: 250 TABLET, DELAYED RELEASE ORAL at 22:15

## 2019-01-01 RX ADMIN — QUETIAPINE 25 MG: 25 TABLET, FILM COATED ORAL at 20:02

## 2019-01-01 RX ADMIN — INSULIN LISPRO 4 UNITS: 100 INJECTION, SOLUTION INTRAVENOUS; SUBCUTANEOUS at 13:21

## 2019-01-01 RX ADMIN — OLANZAPINE 10 MG: 10 INJECTION, POWDER, FOR SOLUTION INTRAMUSCULAR at 17:50

## 2019-01-01 RX ADMIN — METOPROLOL TARTRATE 12.5 MG: 25 TABLET ORAL at 21:39

## 2019-01-01 RX ADMIN — INSULIN LISPRO 5 UNITS: 100 INJECTION, SOLUTION INTRAVENOUS; SUBCUTANEOUS at 08:20

## 2019-01-01 RX ADMIN — METOPROLOL TARTRATE 12.5 MG: 25 TABLET, FILM COATED ORAL at 22:15

## 2019-01-01 RX ADMIN — Medication 100 MG: at 09:16

## 2019-01-01 RX ADMIN — ACETAMINOPHEN 650 MG: 325 TABLET, FILM COATED ORAL at 13:14

## 2019-01-01 RX ADMIN — INSULIN LISPRO 4 UNITS: 100 INJECTION, SOLUTION INTRAVENOUS; SUBCUTANEOUS at 10:12

## 2019-01-01 RX ADMIN — INSULIN LISPRO 7 UNITS: 100 INJECTION, SOLUTION INTRAVENOUS; SUBCUTANEOUS at 17:41

## 2019-01-01 RX ADMIN — METOPROLOL TARTRATE 12.5 MG: 25 TABLET ORAL at 23:09

## 2019-01-01 RX ADMIN — DIVALPROEX SODIUM 500 MG: 125 CAPSULE, COATED PELLETS ORAL at 09:17

## 2019-01-01 RX ADMIN — METOPROLOL TARTRATE 12.5 MG: 25 TABLET, FILM COATED ORAL at 22:16

## 2019-01-01 RX ADMIN — SODIUM CHLORIDE, PRESERVATIVE FREE 10 ML: 5 INJECTION INTRAVENOUS at 23:11

## 2019-01-01 RX ADMIN — CEFEPIME HYDROCHLORIDE 2 G: 2 INJECTION, POWDER, FOR SOLUTION INTRAVENOUS at 16:13

## 2019-01-01 RX ADMIN — SODIUM CHLORIDE, PRESERVATIVE FREE 10 ML: 5 INJECTION INTRAVENOUS at 20:43

## 2019-01-01 RX ADMIN — FAMOTIDINE 40 MG: 20 TABLET ORAL at 21:48

## 2019-01-01 RX ADMIN — OLANZAPINE 5 MG: 5 TABLET, ORALLY DISINTEGRATING ORAL at 20:38

## 2019-01-01 RX ADMIN — FAMOTIDINE 40 MG: 20 TABLET ORAL at 08:17

## 2019-01-01 RX ADMIN — CEFEPIME HYDROCHLORIDE 2 G: 2 INJECTION, POWDER, FOR SOLUTION INTRAVENOUS at 09:16

## 2019-01-01 RX ADMIN — DIVALPROEX SODIUM 250 MG: 250 TABLET, DELAYED RELEASE ORAL at 21:03

## 2019-01-01 RX ADMIN — AMOXICILLIN AND CLAVULANATE POTASSIUM 1 TABLET: 875; 125 TABLET, FILM COATED ORAL at 08:48

## 2019-01-01 RX ADMIN — DIVALPROEX SODIUM 250 MG: 125 CAPSULE, COATED PELLETS ORAL at 06:10

## 2019-01-01 RX ADMIN — Medication 100 MG: at 08:30

## 2019-01-01 RX ADMIN — INSULIN LISPRO 2 UNITS: 100 INJECTION, SOLUTION INTRAVENOUS; SUBCUTANEOUS at 21:50

## 2019-01-01 RX ADMIN — METOPROLOL TARTRATE 12.5 MG: 25 TABLET ORAL at 21:43

## 2019-01-01 RX ADMIN — DIVALPROEX SODIUM 250 MG: 125 CAPSULE, COATED PELLETS ORAL at 21:29

## 2019-01-01 RX ADMIN — INSULIN LISPRO 2 UNITS: 100 INJECTION, SOLUTION INTRAVENOUS; SUBCUTANEOUS at 09:18

## 2019-01-01 RX ADMIN — METOPROLOL TARTRATE 12.5 MG: 25 TABLET ORAL at 07:50

## 2019-01-01 RX ADMIN — Medication 10 ML: at 09:54

## 2019-01-01 RX ADMIN — HEPARIN SODIUM 5000 UNITS: 5000 INJECTION INTRAVENOUS; SUBCUTANEOUS at 20:42

## 2019-01-01 RX ADMIN — METOPROLOL TARTRATE 12.5 MG: 25 TABLET ORAL at 08:40

## 2019-01-01 RX ADMIN — DIVALPROEX SODIUM 250 MG: 125 CAPSULE, COATED PELLETS ORAL at 21:05

## 2019-01-01 RX ADMIN — FOLIC ACID 1 MG: 1 TABLET ORAL at 09:07

## 2019-01-01 RX ADMIN — DIVALPROEX SODIUM 250 MG: 250 TABLET, DELAYED RELEASE ORAL at 17:31

## 2019-01-01 RX ADMIN — FLUCONAZOLE 200 MG: 100 TABLET ORAL at 09:19

## 2019-01-01 RX ADMIN — LORAZEPAM 1 MG: 1 TABLET ORAL at 21:06

## 2019-01-01 RX ADMIN — AMOXICILLIN AND CLAVULANATE POTASSIUM 1 TABLET: 875; 125 TABLET, FILM COATED ORAL at 21:09

## 2019-01-01 RX ADMIN — METOPROLOL TARTRATE 12.5 MG: 25 TABLET, FILM COATED ORAL at 20:09

## 2019-01-01 RX ADMIN — INSULIN GLARGINE 15 UNITS: 100 INJECTION, SOLUTION SUBCUTANEOUS at 09:28

## 2019-01-01 RX ADMIN — CEFTRIAXONE SODIUM 1 G: 1 INJECTION, SOLUTION INTRAVENOUS at 12:27

## 2019-01-01 RX ADMIN — INSULIN GLARGINE 10 UNITS: 100 INJECTION, SOLUTION SUBCUTANEOUS at 06:18

## 2019-01-01 RX ADMIN — DIVALPROEX SODIUM 250 MG: 125 CAPSULE, COATED PELLETS ORAL at 22:11

## 2019-01-01 RX ADMIN — QUETIAPINE FUMARATE 25 MG: 25 TABLET ORAL at 05:57

## 2019-01-01 RX ADMIN — METOPROLOL TARTRATE 12.5 MG: 25 TABLET ORAL at 20:03

## 2019-01-01 RX ADMIN — DIVALPROEX SODIUM 250 MG: 125 CAPSULE, COATED PELLETS ORAL at 14:08

## 2019-01-01 RX ADMIN — QUETIAPINE FUMARATE 25 MG: 25 TABLET ORAL at 17:26

## 2019-01-01 RX ADMIN — OLANZAPINE 5 MG: 5 TABLET, ORALLY DISINTEGRATING ORAL at 15:06

## 2019-01-01 RX ADMIN — METOPROLOL TARTRATE 12.5 MG: 25 TABLET ORAL at 07:26

## 2019-01-01 RX ADMIN — Medication 10 ML: at 09:08

## 2019-01-01 RX ADMIN — ACETAMINOPHEN 325 MG: 325 TABLET ORAL at 22:54

## 2019-01-01 RX ADMIN — DIVALPROEX SODIUM 250 MG: 250 TABLET, DELAYED RELEASE ORAL at 21:31

## 2019-01-01 RX ADMIN — METOPROLOL TARTRATE 12.5 MG: 25 TABLET, FILM COATED ORAL at 09:02

## 2019-01-01 RX ADMIN — AMOXICILLIN AND CLAVULANATE POTASSIUM 1 TABLET: 875; 125 TABLET, FILM COATED ORAL at 21:08

## 2019-01-01 RX ADMIN — DEXTROSE 15 G: 15 GEL ORAL at 12:28

## 2019-01-01 RX ADMIN — DIVALPROEX SODIUM 250 MG: 125 CAPSULE, COATED PELLETS ORAL at 08:54

## 2019-01-01 RX ADMIN — DIVALPROEX SODIUM 250 MG: 250 TABLET, DELAYED RELEASE ORAL at 09:52

## 2019-01-01 RX ADMIN — SODIUM CHLORIDE, SODIUM LACTATE, POTASSIUM CHLORIDE, AND CALCIUM CHLORIDE 2382 ML: 600; 310; 30; 20 INJECTION, SOLUTION INTRAVENOUS at 14:44

## 2019-01-01 RX ADMIN — DIVALPROEX SODIUM 250 MG: 250 TABLET, DELAYED RELEASE ORAL at 16:14

## 2019-01-01 RX ADMIN — DIVALPROEX SODIUM 250 MG: 125 CAPSULE, COATED PELLETS ORAL at 08:40

## 2019-01-01 RX ADMIN — AMOXICILLIN AND CLAVULANATE POTASSIUM 1 TABLET: 875; 125 TABLET, FILM COATED ORAL at 21:48

## 2019-01-01 RX ADMIN — AMOXICILLIN AND CLAVULANATE POTASSIUM 1 TABLET: 875; 125 TABLET, FILM COATED ORAL at 08:46

## 2019-01-01 RX ADMIN — INSULIN GLARGINE 10 UNITS: 100 INJECTION, SOLUTION SUBCUTANEOUS at 06:14

## 2019-01-01 RX ADMIN — DIVALPROEX SODIUM 250 MG: 250 TABLET, DELAYED RELEASE ORAL at 17:37

## 2019-01-01 RX ADMIN — INSULIN LISPRO 3 UNITS: 100 INJECTION, SOLUTION INTRAVENOUS; SUBCUTANEOUS at 08:47

## 2019-01-01 RX ADMIN — OLANZAPINE 5 MG: 5 TABLET, ORALLY DISINTEGRATING ORAL at 08:54

## 2019-01-01 RX ADMIN — DIVALPROEX SODIUM 250 MG: 250 TABLET, DELAYED RELEASE ORAL at 16:00

## 2019-01-01 RX ADMIN — DIVALPROEX SODIUM 500 MG: 125 CAPSULE, COATED PELLETS ORAL at 17:06

## 2019-01-01 RX ADMIN — QUETIAPINE 25 MG: 25 TABLET, FILM COATED ORAL at 13:53

## 2019-01-01 RX ADMIN — OLANZAPINE 5 MG: 5 TABLET, ORALLY DISINTEGRATING ORAL at 21:39

## 2019-01-01 RX ADMIN — INSULIN LISPRO 3 UNITS: 100 INJECTION, SOLUTION INTRAVENOUS; SUBCUTANEOUS at 09:33

## 2019-01-01 RX ADMIN — SODIUM CHLORIDE, PRESERVATIVE FREE 10 ML: 5 INJECTION INTRAVENOUS at 08:02

## 2019-01-01 RX ADMIN — Medication 10 ML: at 21:12

## 2019-01-01 RX ADMIN — INSULIN LISPRO 6 UNITS: 100 INJECTION, SOLUTION INTRAVENOUS; SUBCUTANEOUS at 17:39

## 2019-01-01 RX ADMIN — QUETIAPINE FUMARATE 25 MG: 25 TABLET ORAL at 06:40

## 2019-01-01 RX ADMIN — SODIUM CHLORIDE, PRESERVATIVE FREE 10 ML: 5 INJECTION INTRAVENOUS at 21:44

## 2019-01-01 RX ADMIN — QUETIAPINE FUMARATE 25 MG: 25 TABLET ORAL at 17:39

## 2019-01-01 RX ADMIN — FLUCONAZOLE 200 MG: 100 TABLET ORAL at 09:06

## 2019-01-01 RX ADMIN — FOLIC ACID 1 MG: 1 TABLET ORAL at 08:45

## 2019-01-01 RX ADMIN — INSULIN LISPRO 4 UNITS: 100 INJECTION, SOLUTION INTRAVENOUS; SUBCUTANEOUS at 21:03

## 2019-01-01 RX ADMIN — INSULIN LISPRO 3 UNITS: 100 INJECTION, SOLUTION INTRAVENOUS; SUBCUTANEOUS at 12:17

## 2019-01-01 RX ADMIN — FLUCONAZOLE 200 MG: 100 TABLET ORAL at 08:29

## 2019-01-01 RX ADMIN — QUETIAPINE FUMARATE 25 MG: 25 TABLET ORAL at 03:48

## 2019-01-01 RX ADMIN — QUETIAPINE FUMARATE 25 MG: 25 TABLET ORAL at 03:20

## 2019-01-01 RX ADMIN — OLANZAPINE 5 MG: 5 TABLET, ORALLY DISINTEGRATING ORAL at 17:01

## 2019-01-01 RX ADMIN — Medication 100 MG: at 08:21

## 2019-01-01 RX ADMIN — METOPROLOL TARTRATE 12.5 MG: 25 TABLET ORAL at 08:01

## 2019-01-01 RX ADMIN — DIVALPROEX SODIUM 250 MG: 125 CAPSULE, COATED PELLETS ORAL at 09:33

## 2019-01-01 RX ADMIN — SODIUM CHLORIDE, PRESERVATIVE FREE 10 ML: 5 INJECTION INTRAVENOUS at 21:04

## 2019-01-01 RX ADMIN — INSULIN GLARGINE 20 UNITS: 100 INJECTION, SOLUTION SUBCUTANEOUS at 08:47

## 2019-01-01 RX ADMIN — DIVALPROEX SODIUM 250 MG: 250 TABLET, DELAYED RELEASE ORAL at 15:48

## 2019-01-01 RX ADMIN — FAMOTIDINE 40 MG: 20 TABLET ORAL at 21:08

## 2019-01-01 RX ADMIN — Medication 100 MG: at 08:48

## 2019-01-01 RX ADMIN — INSULIN LISPRO 4 UNITS: 100 INJECTION, SOLUTION INTRAVENOUS; SUBCUTANEOUS at 17:32

## 2019-01-01 RX ADMIN — LORAZEPAM 1 MG: 1 TABLET ORAL at 22:14

## 2019-01-01 RX ADMIN — FAMOTIDINE 40 MG: 20 TABLET ORAL at 22:00

## 2019-01-01 RX ADMIN — METOPROLOL TARTRATE 12.5 MG: 25 TABLET, FILM COATED ORAL at 21:10

## 2019-01-01 RX ADMIN — INSULIN LISPRO 3 UNITS: 100 INJECTION, SOLUTION INTRAVENOUS; SUBCUTANEOUS at 12:18

## 2019-01-01 RX ADMIN — PIPERACILLIN AND TAZOBACTAM 3.38 G: 3; .375 INJECTION, POWDER, LYOPHILIZED, FOR SOLUTION INTRAVENOUS at 09:02

## 2019-01-01 RX ADMIN — DIVALPROEX SODIUM 250 MG: 125 CAPSULE, COATED PELLETS ORAL at 15:12

## 2019-01-01 RX ADMIN — DEXTROSE 50 % IN WATER (D50W) INTRAVENOUS SYRINGE 25 G: at 04:07

## 2019-01-01 RX ADMIN — INSULIN LISPRO 3 UNITS: 100 INJECTION, SOLUTION INTRAVENOUS; SUBCUTANEOUS at 17:43

## 2019-01-01 RX ADMIN — HEPARIN SODIUM 5000 UNITS: 5000 INJECTION INTRAVENOUS; SUBCUTANEOUS at 21:49

## 2019-01-01 RX ADMIN — LORAZEPAM 1 MG: 1 TABLET ORAL at 22:16

## 2019-01-01 RX ADMIN — Medication 100 MG: at 09:53

## 2019-01-01 RX ADMIN — DIVALPROEX SODIUM 250 MG: 125 CAPSULE, COATED PELLETS ORAL at 21:08

## 2019-01-01 RX ADMIN — METOPROLOL TARTRATE 12.5 MG: 25 TABLET ORAL at 09:01

## 2019-01-01 RX ADMIN — DIVALPROEX SODIUM 250 MG: 125 CAPSULE, COATED PELLETS ORAL at 17:14

## 2019-01-01 RX ADMIN — FOLIC ACID 1 MG: 1 TABLET ORAL at 08:32

## 2019-01-01 RX ADMIN — LORAZEPAM 1 MG: 1 TABLET ORAL at 21:29

## 2019-01-01 RX ADMIN — LORAZEPAM 1 MG: 1 TABLET ORAL at 20:02

## 2019-01-01 RX ADMIN — INSULIN LISPRO 3 UNITS: 100 INJECTION, SOLUTION INTRAVENOUS; SUBCUTANEOUS at 08:58

## 2019-01-01 RX ADMIN — DIVALPROEX SODIUM 250 MG: 250 TABLET, DELAYED RELEASE ORAL at 20:39

## 2019-01-01 RX ADMIN — METOPROLOL TARTRATE 12.5 MG: 25 TABLET ORAL at 09:25

## 2019-01-01 RX ADMIN — METOPROLOL TARTRATE 12.5 MG: 25 TABLET ORAL at 10:40

## 2019-01-01 RX ADMIN — ACETAMINOPHEN 650 MG: 325 TABLET, FILM COATED ORAL at 21:31

## 2019-01-01 RX ADMIN — INSULIN GLARGINE 20 UNITS: 100 INJECTION, SOLUTION SUBCUTANEOUS at 09:52

## 2019-01-01 RX ADMIN — HALOPERIDOL LACTATE 2 MG: 5 INJECTION, SOLUTION INTRAMUSCULAR at 11:16

## 2019-01-01 RX ADMIN — CEFTRIAXONE SODIUM 1 G: 1 INJECTION, SOLUTION INTRAVENOUS at 11:50

## 2019-01-01 RX ADMIN — METOPROLOL TARTRATE 12.5 MG: 25 TABLET, FILM COATED ORAL at 20:22

## 2019-01-01 RX ADMIN — DIVALPROEX SODIUM 250 MG: 250 TABLET, DELAYED RELEASE ORAL at 17:10

## 2019-01-01 RX ADMIN — FAMOTIDINE 40 MG: 20 TABLET ORAL at 20:42

## 2019-01-01 RX ADMIN — HEPARIN SODIUM 5000 UNITS: 5000 INJECTION INTRAVENOUS; SUBCUTANEOUS at 09:17

## 2019-01-01 RX ADMIN — FAMOTIDINE 40 MG: 20 TABLET ORAL at 21:28

## 2019-01-01 RX ADMIN — PIPERACILLIN AND TAZOBACTAM 3.38 G: 3; .375 INJECTION, POWDER, LYOPHILIZED, FOR SOLUTION INTRAVENOUS at 16:06

## 2019-01-01 RX ADMIN — HEPARIN SODIUM 5000 UNITS: 5000 INJECTION INTRAVENOUS; SUBCUTANEOUS at 08:07

## 2019-01-01 RX ADMIN — ZIPRASIDONE MESYLATE 10 MG: 20 INJECTION, POWDER, LYOPHILIZED, FOR SOLUTION INTRAMUSCULAR at 13:35

## 2019-01-01 RX ADMIN — CEFTRIAXONE SODIUM 2 G: 2 INJECTION, SOLUTION INTRAVENOUS at 10:00

## 2019-01-01 RX ADMIN — Medication 10 ML: at 09:03

## 2019-01-01 RX ADMIN — DIVALPROEX SODIUM 250 MG: 125 CAPSULE, COATED PELLETS ORAL at 17:41

## 2019-01-01 RX ADMIN — INSULIN LISPRO 10 UNITS: 100 INJECTION, SOLUTION INTRAVENOUS; SUBCUTANEOUS at 08:54

## 2019-01-01 RX ADMIN — FAMOTIDINE 40 MG: 20 TABLET ORAL at 22:17

## 2019-01-01 RX ADMIN — METOPROLOL TARTRATE 12.5 MG: 25 TABLET ORAL at 20:31

## 2019-01-01 RX ADMIN — Medication 100 MG: at 08:46

## 2019-01-01 RX ADMIN — QUETIAPINE FUMARATE 25 MG: 25 TABLET ORAL at 08:58

## 2019-01-01 RX ADMIN — AMOXICILLIN AND CLAVULANATE POTASSIUM 1 TABLET: 875; 125 TABLET, FILM COATED ORAL at 09:06

## 2019-01-01 RX ADMIN — FOLIC ACID 1 MG: 1 TABLET ORAL at 09:53

## 2019-01-01 RX ADMIN — HALOPERIDOL LACTATE 2 MG: 5 INJECTION, SOLUTION INTRAMUSCULAR at 13:36

## 2019-01-01 RX ADMIN — FLUCONAZOLE 200 MG: 100 TABLET ORAL at 09:53

## 2019-01-01 RX ADMIN — QUETIAPINE FUMARATE 25 MG: 25 TABLET ORAL at 23:41

## 2019-01-01 RX ADMIN — METOPROLOL TARTRATE 12.5 MG: 25 TABLET, FILM COATED ORAL at 10:16

## 2019-01-01 RX ADMIN — FOLIC ACID 1 MG: 1 TABLET ORAL at 08:48

## 2019-01-01 RX ADMIN — INSULIN LISPRO 8 UNITS: 100 INJECTION, SOLUTION INTRAVENOUS; SUBCUTANEOUS at 12:28

## 2019-01-01 RX ADMIN — QUETIAPINE FUMARATE 25 MG: 25 TABLET ORAL at 12:54

## 2019-01-01 RX ADMIN — QUETIAPINE FUMARATE 25 MG: 25 TABLET ORAL at 08:01

## 2019-01-01 RX ADMIN — PIPERACILLIN AND TAZOBACTAM 3.38 G: 3; .375 INJECTION, POWDER, LYOPHILIZED, FOR SOLUTION INTRAVENOUS at 00:46

## 2019-01-01 RX ADMIN — METOPROLOL TARTRATE 12.5 MG: 25 TABLET, FILM COATED ORAL at 21:58

## 2019-01-01 RX ADMIN — FOLIC ACID 1 MG: 1 TABLET ORAL at 09:02

## 2019-01-01 RX ADMIN — INSULIN LISPRO 2 UNITS: 100 INJECTION, SOLUTION INTRAVENOUS; SUBCUTANEOUS at 21:36

## 2019-01-01 RX ADMIN — QUETIAPINE FUMARATE 25 MG: 25 TABLET ORAL at 12:08

## 2019-01-01 RX ADMIN — Medication 100 MG: at 08:47

## 2019-01-01 RX ADMIN — ACETAMINOPHEN 650 MG: 325 TABLET, FILM COATED ORAL at 15:47

## 2019-01-01 RX ADMIN — FAMOTIDINE 40 MG: 20 TABLET ORAL at 10:17

## 2019-01-01 RX ADMIN — QUETIAPINE 25 MG: 25 TABLET, FILM COATED ORAL at 16:09

## 2019-01-01 RX ADMIN — FAMOTIDINE 40 MG: 20 TABLET ORAL at 09:07

## 2019-01-01 RX ADMIN — OLANZAPINE 10 MG: 10 INJECTION, POWDER, FOR SOLUTION INTRAMUSCULAR at 01:12

## 2019-01-01 RX ADMIN — PIPERACILLIN SODIUM AND TAZOBACTAM SODIUM 3.38 G: 3; .375 INJECTION, POWDER, LYOPHILIZED, FOR SOLUTION INTRAVENOUS at 17:55

## 2019-01-01 RX ADMIN — HEPARIN SODIUM 5000 UNITS: 5000 INJECTION INTRAVENOUS; SUBCUTANEOUS at 22:15

## 2019-01-01 RX ADMIN — INSULIN LISPRO 4 UNITS: 100 INJECTION, SOLUTION INTRAVENOUS; SUBCUTANEOUS at 21:10

## 2019-01-01 RX ADMIN — CEFTRIAXONE SODIUM 2 G: 2 INJECTION, SOLUTION INTRAVENOUS at 09:47

## 2019-01-01 RX ADMIN — FAMOTIDINE 40 MG: 20 TABLET ORAL at 08:33

## 2019-01-01 RX ADMIN — DIVALPROEX SODIUM 250 MG: 250 TABLET, DELAYED RELEASE ORAL at 20:22

## 2019-01-01 RX ADMIN — DIVALPROEX SODIUM 250 MG: 250 TABLET, DELAYED RELEASE ORAL at 12:19

## 2019-01-01 RX ADMIN — INSULIN LISPRO 2 UNITS: 100 INJECTION, SOLUTION INTRAVENOUS; SUBCUTANEOUS at 17:14

## 2019-01-01 RX ADMIN — DIVALPROEX SODIUM 250 MG: 125 CAPSULE, COATED PELLETS ORAL at 20:32

## 2019-01-01 RX ADMIN — Medication 10 ML: at 21:38

## 2019-01-01 RX ADMIN — INSULIN LISPRO 6 UNITS: 100 INJECTION, SOLUTION INTRAVENOUS; SUBCUTANEOUS at 17:33

## 2019-01-01 RX ADMIN — ONDANSETRON 4 MG: 4 TABLET, ORALLY DISINTEGRATING ORAL at 20:31

## 2019-01-01 RX ADMIN — INSULIN LISPRO 3 UNITS: 100 INJECTION, SOLUTION INTRAVENOUS; SUBCUTANEOUS at 20:41

## 2019-01-01 RX ADMIN — METOPROLOL TARTRATE 12.5 MG: 25 TABLET, FILM COATED ORAL at 08:33

## 2019-01-01 RX ADMIN — METOPROLOL TARTRATE 12.5 MG: 25 TABLET, FILM COATED ORAL at 08:48

## 2019-01-01 RX ADMIN — DIVALPROEX SODIUM 250 MG: 250 TABLET, DELAYED RELEASE ORAL at 21:33

## 2019-01-01 RX ADMIN — METOPROLOL TARTRATE 12.5 MG: 25 TABLET, FILM COATED ORAL at 08:47

## 2019-01-01 RX ADMIN — METOPROLOL TARTRATE 12.5 MG: 25 TABLET, FILM COATED ORAL at 20:42

## 2019-01-01 RX ADMIN — LORAZEPAM 1 MG: 1 TABLET ORAL at 20:22

## 2019-01-01 RX ADMIN — OLANZAPINE 10 MG: 10 INJECTION, POWDER, FOR SOLUTION INTRAMUSCULAR at 14:35

## 2019-01-01 RX ADMIN — Medication 10 ML: at 08:47

## 2019-01-01 RX ADMIN — SODIUM CHLORIDE, PRESERVATIVE FREE 10 ML: 5 INJECTION INTRAVENOUS at 20:03

## 2019-01-01 RX ADMIN — INSULIN LISPRO 3 UNITS: 100 INJECTION, SOLUTION INTRAVENOUS; SUBCUTANEOUS at 21:40

## 2019-01-01 RX ADMIN — METOPROLOL TARTRATE 12.5 MG: 25 TABLET, FILM COATED ORAL at 08:17

## 2019-01-01 RX ADMIN — DIVALPROEX SODIUM 250 MG: 125 CAPSULE, COATED PELLETS ORAL at 21:49

## 2019-01-01 RX ADMIN — METOPROLOL TARTRATE 12.5 MG: 25 TABLET ORAL at 21:31

## 2019-01-01 RX ADMIN — HEPARIN SODIUM 5000 UNITS: 5000 INJECTION INTRAVENOUS; SUBCUTANEOUS at 08:18

## 2019-01-01 RX ADMIN — OLANZAPINE 5 MG: 5 TABLET, ORALLY DISINTEGRATING ORAL at 09:16

## 2019-01-01 RX ADMIN — FAMOTIDINE 40 MG: 20 TABLET ORAL at 08:06

## 2019-01-01 RX ADMIN — INSULIN LISPRO 10 UNITS: 100 INJECTION, SOLUTION INTRAVENOUS; SUBCUTANEOUS at 12:28

## 2019-01-01 RX ADMIN — FAMOTIDINE 40 MG: 20 TABLET ORAL at 20:02

## 2019-01-01 RX ADMIN — FAMOTIDINE 40 MG: 20 TABLET ORAL at 08:46

## 2019-01-01 RX ADMIN — OLANZAPINE 5 MG: 5 TABLET, ORALLY DISINTEGRATING ORAL at 09:33

## 2019-01-01 RX ADMIN — FOLIC ACID 1 MG: 1 TABLET ORAL at 08:06

## 2019-01-01 RX ADMIN — DIVALPROEX SODIUM 250 MG: 250 TABLET, DELAYED RELEASE ORAL at 07:50

## 2019-01-01 RX ADMIN — QUETIAPINE FUMARATE 25 MG: 25 TABLET ORAL at 01:09

## 2019-01-01 RX ADMIN — HEPARIN SODIUM 5000 UNITS: 5000 INJECTION INTRAVENOUS; SUBCUTANEOUS at 09:02

## 2019-01-01 RX ADMIN — OLANZAPINE 5 MG: 5 TABLET, ORALLY DISINTEGRATING ORAL at 20:32

## 2019-01-01 RX ADMIN — Medication 100 MG: at 09:07

## 2019-01-01 RX ADMIN — QUETIAPINE 25 MG: 25 TABLET, FILM COATED ORAL at 04:12

## 2019-01-01 RX ADMIN — INSULIN LISPRO 4 UNITS: 100 INJECTION, SOLUTION INTRAVENOUS; SUBCUTANEOUS at 17:59

## 2019-01-01 RX ADMIN — QUETIAPINE FUMARATE 25 MG: 25 TABLET ORAL at 15:47

## 2019-01-01 RX ADMIN — INSULIN GLARGINE 20 UNITS: 100 INJECTION, SOLUTION SUBCUTANEOUS at 08:00

## 2019-01-01 RX ADMIN — DIVALPROEX SODIUM 250 MG: 125 CAPSULE, COATED PELLETS ORAL at 14:14

## 2019-01-01 RX ADMIN — DIVALPROEX SODIUM 250 MG: 250 TABLET, DELAYED RELEASE ORAL at 23:10

## 2019-01-01 RX ADMIN — METOPROLOL TARTRATE 12.5 MG: 25 TABLET, FILM COATED ORAL at 20:02

## 2019-01-01 RX ADMIN — QUETIAPINE FUMARATE 25 MG: 25 TABLET ORAL at 01:18

## 2019-01-01 RX ADMIN — QUETIAPINE 25 MG: 25 TABLET, FILM COATED ORAL at 08:17

## 2019-01-01 RX ADMIN — QUETIAPINE 25 MG: 25 TABLET, FILM COATED ORAL at 12:42

## 2019-01-01 RX ADMIN — INSULIN LISPRO 3 UNITS: 100 INJECTION, SOLUTION INTRAVENOUS; SUBCUTANEOUS at 12:27

## 2019-01-01 RX ADMIN — AMOXICILLIN AND CLAVULANATE POTASSIUM 1 TABLET: 875; 125 TABLET, FILM COATED ORAL at 10:16

## 2019-01-01 RX ADMIN — INSULIN LISPRO 2 UNITS: 100 INJECTION, SOLUTION INTRAVENOUS; SUBCUTANEOUS at 14:07

## 2019-01-01 RX ADMIN — OLANZAPINE 5 MG: 10 INJECTION, POWDER, FOR SOLUTION INTRAMUSCULAR at 18:43

## 2019-01-01 RX ADMIN — SODIUM CHLORIDE, PRESERVATIVE FREE 10 ML: 5 INJECTION INTRAVENOUS at 21:32

## 2019-01-01 RX ADMIN — Medication 10 ML: at 22:23

## 2019-01-01 RX ADMIN — Medication 100 MG: at 08:32

## 2019-12-01 PROBLEM — L03.115 CELLULITIS OF RIGHT LOWER EXTREMITY: Status: ACTIVE | Noted: 2019-01-01

## 2019-12-01 PROBLEM — J18.9 SEPSIS DUE TO PNEUMONIA (HCC): Status: ACTIVE | Noted: 2019-01-01

## 2019-12-01 PROBLEM — J18.9 PNEUMONIA OF RIGHT LOWER LOBE DUE TO INFECTIOUS ORGANISM: Status: ACTIVE | Noted: 2019-01-01

## 2019-12-01 PROBLEM — E11.65 UNCONTROLLED TYPE 2 DIABETES MELLITUS WITH HYPERGLYCEMIA (HCC): Chronic | Status: ACTIVE | Noted: 2018-09-19

## 2019-12-01 PROBLEM — I10 PRIMARY HYPERTENSION: Status: ACTIVE | Noted: 2019-01-01

## 2019-12-01 PROBLEM — A41.9 SEPSIS DUE TO PNEUMONIA (HCC): Status: ACTIVE | Noted: 2019-01-01

## 2019-12-01 NOTE — ED PROVIDER NOTES
"Subjective   79-year-old male sent in from a mini home after he was noted to have altered mental status after lunch.  The patient apparently had been evaluated 10 days ago for worsening confusion and delirium but had a negative work-up at that time.  His blood sugar was reportedly normal.  There is no documented elevation of fever or chills although the patient's significant other noticed that he felt warm.  The patient apparently has recently been started on valproic acid for dementia features and no recent levels were reported.  Patient also apparently receives lorazepam for generalized anxiety            Review of Systems    Past Medical History:   Diagnosis Date   • Alzheimer disease    • Diabetes mellitus (CMS/[facility])      In addition to Alzheimer's dementia and diabetes mellitus the patient has a long history of hypertension, gastroesophageal reflux disease, generalized anxiety disorder, anemia, and immobilization syndrome.  The patient has been treated with radioactive seeds according to his significant other for prostate cancer  No Known Allergies    No past surgical history on file.    No family history on file.    Social History     Socioeconomic History   • Marital status:      Spouse name: Not on file   • Number of children: Not on file   • Years of education: Not on file   • Highest education level: Not on file   Tobacco Use   • Smoking status: Never Smoker   Substance and Sexual Activity   • Alcohol use: No   • Drug use: Defer   • Sexual activity: Defer     Resident of a mini home where 1 of the attendants reportedly has \"walking pneumonia\"      Objective   Physical Exam  Alert Willie Coma Scale 15   HEENT: Pupils equal and reactive to light. Conjunctivae are not injected. normal tympanic membranes. Oropharynx and nares are normal.   Neck: Supple. Midline trachea. No JVD. No goiter.   Chest: Rales are identified in the right base.  There are scattered rhonchi and expiratory wheezes bilaterally r " and equal breath sounds bilaterally regular rate and rhythm without murmur or rub.   Abdomen: Positive bowel sounds nontender nondistended. No rebound or peritoneal signs. No CVA tenderness.   Extremities no clubbing cyanosis or edema motor sensory exam is normal the full range of motion is intact   skin: Warm and dry, no rashes or petechia.   Lymphatic: No regional lymphadenopathy. No calf pain, swelling or Oliverio's sign    Procedures       Attempts to obtain urine via Sinha catheter were negative secondary to prostatic enlargement Cude catheter down to 14 Malagasy was attempted without success    ED Course      Labs Reviewed   COMPREHENSIVE METABOLIC PANEL - Abnormal; Notable for the following components:       Result Value    Glucose 272 (*)     Sodium 134 (*)     Chloride 97 (*)     Calcium 8.4 (*)     Albumin 3.20 (*)     Alkaline Phosphatase 124 (*)     All other components within normal limits    Narrative:     GFR Normal >60  Chronic Kidney Disease <60  Kidney Failure <15   CBC WITH AUTO DIFFERENTIAL - Abnormal; Notable for the following components:    RBC 3.50 (*)     Hemoglobin 11.1 (*)     Hematocrit 33.1 (*)     RDW 16.1 (*)     Neutrophil % 77.3 (*)     Lymphocyte % 11.8 (*)     Neutrophils, Absolute 7.40 (*)     Monocytes, Absolute 1.00 (*)     All other components within normal limits   VALPROIC ACID LEVEL, TOTAL - Abnormal; Notable for the following components:    Valproic Acid 26.7 (*)     All other components within normal limits   POC LACTATE - Abnormal   POC LACTATE - Abnormal; Notable for the following components:    Lactate 2.3 (*)     All other components within normal limits   BLOOD CULTURE   BLOOD CULTURE   RAINBOW DRAW    Narrative:     The following orders were created for panel order [facility] Draw.  Procedure                               Abnormality         Status                     ---------                               -----------         ------                     Light Blue  Top[[id number]]                                   Final result               Green Top (Gel)[[id number]]                                  Final result               Lavender Top[[id number]]                                     Final result               Gold Top - SST[[id number]]                                   Final result                 Please view results for these tests on the individual orders.   URINALYSIS W/ CULTURE IF INDICATED   LACTIC ACID REFLEX TIMER   POC LACTATE   CBC AND DIFFERENTIAL    Narrative:     The following orders were created for panel order CBC & Differential.  Procedure                               Abnormality         Status                     ---------                               -----------         ------                     CBC Auto Differential[[id number]]        Abnormal            Final result                 Please view results for these tests on the individual orders.   LIGHT BLUE TOP   GREEN TOP   LAVENDER TOP   GOLD TOP - SST     Medications   sodium chloride 0.9 % flush 10 mL (not administered)   lactated ringers bolus 2,382 mL (2,382 mL Intravenous New Bag 12/1/19 1444)   ipratropium-albuterol (DUO-NEB) nebulizer solution 3 mL (3 mL Nebulization Given 12/1/19 1422)   acetaminophen (TYLENOL) suppository 975 mg (975 mg Rectal Given 12/1/19 1445)   ceFEPime (MAXIPIME) in SWFI 2g/10ml IV PUSH syringe (2 g Intravenous Given 12/1/19 1613)     Xr Chest 1 View    Result Date: 12/1/2019  1. 2 cm nodular opacity in the left upper chest is most likely due to calcified cartilage at the left first rib and. Recommend PA and lateral when possible to confirm. 2. Mild left basal infiltrate versus atelectasis.  Electronically Signed By-Joelle Luna On:12/1/2019 2:56 PM This report was finalized on [id number] by  Joelle Luna, .              [facility]  Number of Diagnoses or Management Options     Amount and/or Complexity of Data Reviewed  Clinical lab tests: reviewed  Tests in the radiology  section of CPT®: reviewed  Obtain history from someone other than the patient: yes  Review and summarize past medical records: yes  Discuss the patient with other providers: yes  Independent visualization of images, tracings, or specimens: yes    Risk of Complications, Morbidity, and/or Mortality  Presenting problems: high  Diagnostic procedures: high  Management options: high  General comments: Patient's significant other was able to provide some information, as well as EMS.  We reviewed the limited paperwork obtained from the [facility] which is in [facility].  The patient's physician does not come to this facility and the patient's case was discussed with the hospitalist.  The patient appeared agreeable to this plan of treatment        Final diagnoses:   Pneumonia of right lower lobe due to infectious organism (CMS/HCC)   Delirium due to another medical condition   Alzheimer's dementia with behavioral disturbance, unspecified timing of dementia onset (CMS/HCC)   Sepsis without acute organ dysfunction, due to unspecified organism (CMS/HCC)   Mass of left chest wall              Paul Elizabeth MD  12/01/19 1623       Paul Elizabeth MD  12/01/19 [id number]

## 2019-12-01 NOTE — ED NOTES
Pt brought in by ems from [facility] for altered mental status. Wife states that he started to act different around lunch time. Repeated in and out cath attempted. Dr gaspar notified.      Amy Freeman, RN  12/01/19 7222

## 2019-12-02 PROBLEM — G92.8 TOXIC METABOLIC ENCEPHALOPATHY: Status: ACTIVE | Noted: 2019-01-01

## 2019-12-02 PROBLEM — F03.90 DEMENTIA WITHOUT BEHAVIORAL DISTURBANCE (HCC): Status: ACTIVE | Noted: 2019-01-01

## 2019-12-02 NOTE — ASSESSMENT & PLAN NOTE
I would be in favor of deep prescribing statins on account of multiple comorbidities and competing causes of mortality

## 2019-12-02 NOTE — PROGRESS NOTES
"      [facility] Medicine Services Daily Progress Note      Hospitalist Team  LOS 1 days      Patient Care Team:  Carolina Tran FNP as PCP - General (Family Medicine)    Patient Location: 2124/1      Subjective   Subjective     Chief Complaint / Subjective  Chief Complaint   Patient presents with   • Altered Mental Status       HPI limited with confusion and dementia. Reports feeling \"fine\" no family in room.    Brief Synopsis of Hospital Course/HPI  79-year-old male sent in from a mini home after he was noted to have altered mental status after lunch.  The patient apparently had been evaluated 10 days ago for worsening confusion and delirium but had a negative work-up at that time.  His blood sugar was reportedly normal.  There is no documented elevation of fever or chills although the patient's significant other noticed that he felt warm.  The patient apparently has recently been started on valproic acid for dementia features and no recent levels were reported.  Patient also apparently receives lorazepam for generalized anxiety     History from the wife.  She was generally unsatisfied with care at the [facility] inpatient facility.  Unknown why he was admitted over there.  Prior to that he was in nursing home and before that he was at [facility].  She states that he has not been home in about 2 months.  Mostly bedbound.  Needs plenty of help.  Known history of prostate cancer in the past with seed implantation.          Review of Systems   Unable to perform ROS: dementia         Objective   Objective      Vital Signs  Temp:  [98.3 °F (36.8 °C)-101.7 °F (38.7 °C)] 98.3 °F (36.8 °C)  Heart Rate:  [70-93] 93  Resp:  [16-22] 16  BP: (110-140)/(45-77) 138/77  Oxygen Therapy  SpO2: 96 %  Pulse Oximetry Type: Continuous  Device (Oxygen Therapy): nasal cannula  Flow (L/min): 2  Flowsheet Rows      First Filed Value   Admission Height  182.9 cm (72\") Documented at 12/01/2019 1411   Admission Weight  " 79.4 kg (175 lb) Documented at 12/01/2019 1411        Intake & Output (last 3 days)       11/29 0701 - 11/30 0700 11/30 0701 - 12/01 0700 12/01 0701 - 12/02 0700 12/02 0701 - 12/03 0700    P.O.    360    Total Intake(mL/kg)    360 (4.5)    Urine (mL/kg/hr)   925     Total Output   925     Net   -925 +360            Urine Unmeasured Occurrence    1 x        Lines, Drains & Airways    Active LDAs     None                  Physical Exam:    Physical Exam   Constitutional: No distress.   HENT:   Head: Normocephalic.   Eyes: Pupils are equal, round, and reactive to light.   Pulmonary/Chest: Effort normal.   Diminished BS at the bases   Abdominal: Soft.   Neurological: He is alert.   Oriented x1         Results Review:     I reviewed the patient's new clinical results.      Lab Results (last 24 hours)     Procedure Component Value Units Date/Time    POC Glucose Once [[id number]]  (Abnormal) Collected:  12/02/19 1639    Specimen:  Blood Updated:  12/02/19 1642     Glucose 185 mg/dL      Comment: Serial Number: [id number]:  [id number]       Blood Culture - Blood, Arm, Right [[id number]] Collected:  12/01/19 1449    Specimen:  Blood from Arm, Right Updated:  12/02/19 1504     Blood Culture No growth at 24 hours    Blood Culture - Blood, Arm, Left [[id number]] Collected:  12/01/19 1436    Specimen:  Blood from Arm, Left Updated:  12/02/19 1447     Blood Culture No growth at 24 hours    Urine Culture - Urine, Urine, Catheter [[id number]]  (Abnormal) Collected:  12/01/19 1702    Specimen:  Urine, Catheter Updated:  12/02/19 1328     Urine Culture Yeast isolated     Comment: No further work up.       Lactic Acid, Plasma [[id number]]  (Normal) Collected:  12/02/19 1243    Specimen:  Blood Updated:  12/02/19 1324     Lactate 2.0 mmol/L      Comment: Result checked        POC Glucose Once [[id number]]  (Abnormal) Collected:  12/02/19 1121    Specimen:  Blood Updated:  12/02/19 1125     Glucose 155 mg/dL      Comment: Serial Number:  [id number]:  [id number]       POC Glucose Once [[id number]]  (Normal) Collected:  12/02/19 0936    Specimen:  Blood Updated:  12/02/19 0940     Glucose 74 mg/dL      Comment: Serial Number: [id number]:  [id number]       Lactic Acid, Plasma [[id number]]  (Normal) Collected:  12/02/19 0712    Specimen:  Blood Updated:  12/02/19 0806     Lactate 0.8 mmol/L     POC Glucose Once [[id number]]  (Normal) Collected:  12/02/19 0728    Specimen:  Blood Updated:  12/02/19 0730     Glucose 77 mg/dL      Comment: Serial Number: [id number]:  [id number]       Lactic Acid, Plasma [[id number]]  (Normal) Collected:  12/02/19 0148    Specimen:  Blood Updated:  12/02/19 0245     Lactate 1.1 mmol/L     Basic Metabolic Panel [[id number]]  (Abnormal) Collected:  12/02/19 0148    Specimen:  Blood Updated:  12/02/19 0229     Glucose 115 mg/dL      BUN 17 mg/dL      Creatinine 0.84 mg/dL      Sodium 137 mmol/L      Potassium 4.0 mmol/L      Chloride 101 mmol/L      CO2 28.0 mmol/L      Calcium 7.8 mg/dL      eGFR Non African Amer 88 mL/min/1.73      BUN/Creatinine Ratio 20.2     Anion Gap 8.0 mmol/L     Narrative:       GFR Normal >60  Chronic Kidney Disease <60  Kidney Failure <15    CBC & Differential [[id number]] Collected:  12/02/19 0148    Specimen:  Blood Updated:  12/02/19 0213    Narrative:       The following orders were created for panel order CBC & Differential.  Procedure                               Abnormality         Status                     ---------                               -----------         ------                     CBC Auto Differential[[id number]]        Abnormal            Final result                 Please view results for these tests on the individual orders.    CBC Auto Differential [[id number]]  (Abnormal) Collected:  12/02/19 0148    Specimen:  Blood Updated:  12/02/19 0213     WBC 9.00 10*3/mm3      RBC 3.03 10*6/mm3      Hemoglobin 9.5 g/dL      Hematocrit 28.3 %      MCV 93.4 fL      MCH 31.4  pg      Comment: Result checked         MCHC 33.6 g/dL      Comment: Result checked         RDW 15.9 %      RDW-SD 52.1 fl      MPV 6.4 fL      Platelets 286 10*3/mm3      Neutrophil % 69.8 %      Lymphocyte % 17.9 %      Monocyte % 11.8 %      Eosinophil % 0.2 %      Basophil % 0.3 %      Neutrophils, Absolute 6.30 10*3/mm3      Lymphocytes, Absolute 1.60 10*3/mm3      Monocytes, Absolute 1.10 10*3/mm3      Eosinophils, Absolute 0.00 10*3/mm3      Basophils, Absolute 0.00 10*3/mm3      nRBC 0.1 /100 WBC     Respiratory Panel, PCR - Swab, Nasopharynx [[id number]]  (Abnormal) Collected:  12/01/19 2029    Specimen:  Swab from Nasopharynx Updated:  12/01/19 2154     ADENOVIRUS, PCR Not Detected     Coronavirus 229E Not Detected     Coronavirus HKU1 Not Detected     Coronavirus NL63 Not Detected     Coronavirus OC43 Not Detected     Human Metapneumovirus Detected     Human Rhinovirus/Enterovirus Not Detected     Influenza B PCR Not Detected     Parainfluenza Virus 1 Not Detected     Parainfluenza Virus 2 Not Detected     Parainfluenza Virus 3 Not Detected     Parainfluenza Virus 4 Not Detected     Bordetella pertussis pcr Not Detected     Influenza A H1 2009 PCR Not Detected     Chlamydophila pneumoniae PCR Not Detected     Mycoplasma pneumo by PCR Not Detected     Influenza A PCR Not Detected     Influenza A H3 Not Detected     Influenza A H1 Not Detected     RSV, PCR Not Detected    CBC & Differential [[id number]] Collected:  12/01/19 2036    Specimen:  Blood Updated:  12/01/19 2116    Narrative:       The following orders were created for panel order CBC & Differential.  Procedure                               Abnormality         Status                     ---------                               -----------         ------                     CBC Auto Differential[[id number]]        Abnormal            Final result                 Please view results for these tests on the individual orders.    CBC Auto Differential  [[id number]]  (Abnormal) Collected:  12/01/19 2036    Specimen:  Blood Updated:  12/01/19 2116     WBC 9.60 10*3/mm3      RBC 3.03 10*6/mm3      Hemoglobin 10.4 g/dL      Hematocrit 28.6 %      MCV 94.5 fL      MCH 34.4 pg      MCHC 36.4 g/dL      RDW 15.8 %      RDW-SD 52.9 fl      MPV 6.9 fL      Platelets 333 10*3/mm3      Neutrophil % 77.7 %      Lymphocyte % 10.4 %      Monocyte % 11.3 %      Eosinophil % 0.2 %      Basophil % 0.4 %      Neutrophils, Absolute 7.40 10*3/mm3      Lymphocytes, Absolute 1.00 10*3/mm3      Monocytes, Absolute 1.10 10*3/mm3      Eosinophils, Absolute 0.00 10*3/mm3      Basophils, Absolute 0.00 10*3/mm3      nRBC 0.0 /100 WBC     POC Glucose Once [[id number]]  (Abnormal) Collected:  12/01/19 2053    Specimen:  Blood Updated:  12/01/19 2054     Glucose 347 mg/dL      Comment: Serial Number: [id number]:  [id number]       BNP [[id number]]  (Normal) Collected:  12/01/19 1436    Specimen:  Blood from Arm, Left Updated:  12/01/19 1954     proBNP 1,692.0 pg/mL     Narrative:       Among patients with dyspnea, NT-proBNP is highly sensitive for the detection of acute congestive heart failure. In addition NT-proBNP of <300 pg/ml effectively rules out acute congestive heart failure with 99% negative predictive value.    Magnesium [[id number]]  (Normal) Collected:  12/01/19 1436    Specimen:  Blood from Arm, Left Updated:  12/01/19 1949     Magnesium 2.0 mg/dL     Procalcitonin [[id number]]  (Abnormal) Collected:  12/01/19 1436    Specimen:  Blood from Arm, Left Updated:  12/01/19 1927     Procalcitonin 0.97 ng/mL     Narrative:       As a Marker for Sepsis (Non-Neonates):   1. <0.5 ng/mL represents a low risk of severe sepsis and/or septic shock.  1. >2 ng/mL represents a high risk of severe sepsis and/or septic shock.    As a Marker for Lower Respiratory Tract Infections that require antibiotic therapy:  PCT on Admission     Antibiotic Therapy             6-12 Hrs later  > 0.5                 "Strongly Recommended            >0.25 - <0.5         Recommended  0.1 - 0.25           Discouraged                   Remeasure/reassess PCT  <0.1                 Strongly Discouraged          Remeasure/reassess PCT      As 28 day mortality risk marker: \"Change in Procalcitonin Result\" (> 80 % or <=80 %) if Day 0 (or Day 1) and Day 4 values are available. Refer to http://www.[email or web address]antThe Farmerypct-calculator.com/   Change in PCT <=80 %   A decrease of PCT levels below or equal to 80 % defines a positive change in PCT test result representing a higher risk for 28-day all-cause mortality of patients diagnosed with severe sepsis or septic shock.  Change in PCT > 80 %   A decrease of PCT levels of more than 80 % defines a negative change in PCT result representing a lower risk for 28-day all-cause mortality of patients diagnosed with severe sepsis or septic shock.                Lactic Acid, Reflex [[id number]]  (Abnormal) Collected:  12/01/19 1808    Specimen:  Blood Updated:  12/01/19 1851     Lactate 2.2 mmol/L         No results found for: HGBA1C                Microbiology Results (last 10 days)     Procedure Component Value - Date/Time    Respiratory Panel, PCR - Swab, Nasopharynx [[id number]]  (Abnormal) Collected:  12/01/19 2029    Lab Status:  Final result Specimen:  Swab from Nasopharynx Updated:  12/01/19 2154     ADENOVIRUS, PCR Not Detected     Coronavirus 229E Not Detected     Coronavirus HKU1 Not Detected     Coronavirus NL63 Not Detected     Coronavirus OC43 Not Detected     Human Metapneumovirus Detected     Human Rhinovirus/Enterovirus Not Detected     Influenza B PCR Not Detected     Parainfluenza Virus 1 Not Detected     Parainfluenza Virus 2 Not Detected     Parainfluenza Virus 3 Not Detected     Parainfluenza Virus 4 Not Detected     Bordetella pertussis pcr Not Detected     Influenza A H1 2009 PCR Not Detected     Chlamydophila pneumoniae PCR Not Detected     Mycoplasma pneumo by PCR Not Detected     Influenza A " PCR Not Detected     Influenza A H3 Not Detected     Influenza A H1 Not Detected     RSV, PCR Not Detected    Urine Culture - Urine, Urine, Catheter [[id number]]  (Abnormal) Collected:  12/01/19 1702    Lab Status:  Final result Specimen:  Urine, Catheter Updated:  12/02/19 1328     Urine Culture Yeast isolated     Comment: No further work up.       Blood Culture - Blood, Arm, Right [[id number]] Collected:  12/01/19 1449    Lab Status:  Preliminary result Specimen:  Blood from Arm, Right Updated:  12/02/19 1504     Blood Culture No growth at 24 hours    Blood Culture - Blood, Arm, Left [[id number]] Collected:  12/01/19 1436    Lab Status:  Preliminary result Specimen:  Blood from Arm, Left Updated:  12/02/19 1447     Blood Culture No growth at 24 hours          ECG/EMG Results (most recent)     None                    Xr Chest 1 View    Result Date: 12/2/2019   1. Moderate interval worsening from December 1 2. Bilateral perihilar pneumonia is mild to moderate in degree along with change suggesting low-grade failure  Electronically Signed By-Yunier Coronel Jr. On:12/2/2019 7:24 AM This report was finalized on [id number] by  Yunier Coronel Jr., .    Xr Chest 1 View    Result Date: 12/1/2019  1. 2 cm nodular opacity in the left upper chest is most likely due to calcified cartilage at the left first rib and. Recommend PA and lateral when possible to confirm. 2. Mild left basal infiltrate versus atelectasis.  Electronically Signed By-Joelle Luna On:12/1/2019 2:56 PM This report was finalized on [id number] by  Joelle Luna .      Xrays, labs reviewed personally by physician.    Medication Review:   I have reviewed the patient's current medication list      Scheduled Meds    divalproex 250 mg Oral TID   famotidine 40 mg Oral BID   folic acid 1 mg Oral Daily   heparin (porcine) 5,000 Units Subcutaneous Q12H   insulin glargine 30 Units Subcutaneous Nightly   insulin lispro 0-9 Units Subcutaneous 4x Daily With Meals &  Nightly   insulin lispro 5 Units Subcutaneous TID AC   LORazepam 1 mg Oral Nightly   metoprolol tartrate 12.5 mg Oral BID   piperacillin-tazobactam 3.375 g Intravenous Once   [START ON 12/3/2019] piperacillin-tazobactam 3.375 g Intravenous Q8H   sodium chloride 10 mL Intravenous Q12H   thiamine 100 mg Oral Daily       Meds Infusions       Meds PRN  •  acetaminophen **OR** acetaminophen **OR** acetaminophen  •  acetaminophen  •  bisacodyl  •  dextrose  •  dextrose  •  glucagon (human recombinant)  •  haloperidol lactate  •  insulin lispro **AND** insulin lispro  •  lactated ringers  •  polyethylene glycol  •  prochlorperazine  •  QUEtiapine  •  sodium chloride  •  sodium chloride    I personally reviewed patient's EKG    Assessment/Plan   Assessment/Plan     Active Hospital Problems:  * Sepsis due to pneumonia (CMS/HCC)- (present on admission)    Resolving  Cx negative thus far      HCAP (healthcare-associated pneumonia)- (present on admission)    Empiric antibiotics  Supportive treatment  Cx negative thus far      Toxic metabolic encephalopathy    D/t PNA and sepsis  tx underlying causes       Dementia without behavioral disturbance (CMS/HCC)    Psych following  Continue medication regimen      Cellulitis of right lower extremity- (present on admission)    Continue zosyn     Primary hypertension- (present on admission)    Continue home medications       Uncontrolled type 2 diabetes mellitus with hyperglycemia (CMS/HCC)- (present on admission)    Insulin basal bolus               VTE Prophylaxis - heparin.      Code Status -   Code Status and Medical Interventions:   Ordered at: 12/01/19 1925     Code Status:    CPR     Medical Interventions (Level of Support Prior to Arrest):    Full       Discharge Planning        Electronically signed by Gera Sandhu DO, 12/02/19, 5:52 PM.  [facility] Hospitalist Team

## 2019-12-02 NOTE — PLAN OF CARE
Problem: Fall Risk (Adult)  Goal: Identify Related Risk Factors and Signs and Symptoms  Outcome: Ongoing (interventions implemented as appropriate)    Goal: Absence of Fall  Outcome: Ongoing (interventions implemented as appropriate)      Problem: Patient Care Overview  Goal: Plan of Care Review  Outcome: Ongoing (interventions implemented as appropriate)   12/02/19 0532   Coping/Psychosocial   Plan of Care Reviewed With patient   Plan of Care Review   Progress improving   OTHER   Outcome Summary continue antbx       Problem: Skin Injury Risk (Adult)  Goal: Identify Related Risk Factors and Signs and Symptoms  Outcome: Ongoing (interventions implemented as appropriate)    Goal: Skin Health and Integrity  Outcome: Ongoing (interventions implemented as appropriate)

## 2019-12-02 NOTE — PROGRESS NOTES
Continued Stay Note   [facility]     Patient Name: Yuriy Bennett  MRN: [id number]  Today's Date: 12/2/2019    Admit Date: 12/1/2019    Discharge Plan     Row Name 12/02/19 1419       Plan    Plan  DC Plan: PT/OT abdoulaye pending. From [facility]. Psych eval pending. Spouse interested in [facility] once she tours. Will require PASRR & precert for rehab.     Plan Comments  SW met with pt to provide [place] rehab list (brought [facility], guide to navigating senior healthcare) & pt's spouse decliend d/t already having from [facility] stay at [facility]. Spouse reports she plans to tour [facility] tomorrow (12/3) in the afternoon, declined interest in [facility]. SW informed d/c planning will depend on psych evaluation & recommendation. Spouse verbalized understanding.      SAILAJA Ramos    Phone: [phone]  Cell: [phone]  Fax: [phone]  [email or web address]

## 2019-12-02 NOTE — PLAN OF CARE
Problem: Fall Risk (Adult)  Goal: Identify Related Risk Factors and Signs and Symptoms  Outcome: Ongoing (interventions implemented as appropriate)    Goal: Absence of Fall  Outcome: Ongoing (interventions implemented as appropriate)      Problem: Patient Care Overview  Goal: Plan of Care Review  Outcome: Ongoing (interventions implemented as appropriate)   12/02/19 0523   Coping/Psychosocial   Plan of Care Reviewed With patient   Plan of Care Review   Progress improving   OTHER   Outcome Summary Pt very confused. Psych seeing pt and agree with current meds. Periods of agitation. Pt wife does not want him to return to [facility] at d/c.     Goal: Individualization and Mutuality  Outcome: Ongoing (interventions implemented as appropriate)    Goal: Discharge Needs Assessment  Outcome: Ongoing (interventions implemented as appropriate)    Goal: Interprofessional Rounds/Family Conf  Outcome: Ongoing (interventions implemented as appropriate)      Problem: Skin Injury Risk (Adult)  Goal: Identify Related Risk Factors and Signs and Symptoms  Outcome: Ongoing (interventions implemented as appropriate)    Goal: Skin Health and Integrity  Outcome: Ongoing (interventions implemented as appropriate)

## 2019-12-02 NOTE — CONSULTS
Referring Provider: Dr. Hansen  Reason for Consultation: Mental status changes       Chief complaint: No complaints voiced    Subjective .     History of present illness:  The patient is a 79 y.o.white male with PMH significant for Alzheimers dementia, prostate cancer, Diabetes, and HTN who was admitted secondary to Mental status changes.  Psych was consulted by Dr. Hansen.  Mental status changes.  Patient is a poor historian.  His wife is at bedside and able to help with history.  She reports that he was diagnosed with Alzheimer's dementia in 2017.  He has continued to decline, has some behavioral issues and was sent from rehab to [facility].  His wife spoke poorly of the new facility in their care for her .  He is currently on Depakote and lorazepam for anxiety and agitation issues.  His wife says that prior to his diagnosis of dementia, he had no previous psychiatric history.  He had never been on any psychiatric medication for now.  His wife says that at his baseline, he still recognizes her, but today he is very confused and does not recognize her, does not know where he is, his date of birth, the current date.  He reported that he had no children, but his wife says they have 2.  Per nursing, he has been cooperative without any significant agitation or aggression.    Past psychiatric history: Unremarkable prior to dementia diagnosis in 2017.  Review of Systems   Unable to perform ROS: Dementia        History    Past Medical History:   Diagnosis Date   • Alzheimer disease (CMS/[facility])    • Cancer (CMS/[facility])     prostate   • Diabetes mellitus (CMS/[facility])    • Hypertension         History reviewed. No pertinent family history.     Social History     Tobacco Use   • Smoking status: Never Smoker   • Smokeless tobacco: Never Used   Substance Use Topics   • Alcohol use: No   • Drug use: Defer          Medications Prior to Admission   Medication Sig Dispense Refill Last Dose   • divalproex (DEPAKOTE) 125 MG DR tablet  Take 250 mg by mouth 3 (Three) Times a Day.   12/1/2019 at Unknown time   • famotidine (PEPCID) 20 MG tablet Take 40 mg by mouth 2 (Two) Times a Day.   12/1/2019 at Unknown time   • folic acid (FOLVITE) 1 MG tablet Take 1 mg by mouth Daily.   12/1/2019 at Unknown time   • haloperidol lactate (HALDOL) 5 MG/ML injection Inject 5 mg into the appropriate muscle as directed by prescriber Every 6 (Six) Hours As Needed for Agitation.   Past Week at Unknown time   • insulin glargine (LANTUS) 100 UNIT/ML injection Inject 15 Units under the skin into the appropriate area as directed Daily.      • insulin lispro (humaLOG) 100 UNIT/ML injection Inject  under the skin into the appropriate area as directed 4 (Four) Times a Day After Meals & at Bedtime. Sliding scale   12/1/2019 at Unknown time   • insulin lispro (humaLOG) 100 UNIT/ML injection Inject 5 Units under the skin into the appropriate area as directed 3 (Three) Times a Day Before Meals.   12/1/2019 at Unknown time   • LORazepam (ATIVAN) 1 MG tablet Take 1 mg by mouth every night at bedtime.   11/30/2019 at Unknown time   • LORazepam (ATIVAN) 2 MG/ML injection Inject 1 mg into the appropriate muscle as directed by prescriber Every 6 (Six) Hours As Needed for Anxiety.   Past Week at Unknown time   • metoprolol tartrate (LOPRESSOR) 25 MG tablet Take 12.5 mg by mouth 2 (Two) Times a Day.   12/1/2019 at Unknown time   • QUEtiapine (SEROquel) 25 MG tablet Take 25 mg by mouth Every 4 (Four) Hours As Needed (for agitation).   Past Week at Unknown time   • temazepam (RESTORIL) 15 MG capsule Take 15 mg by mouth every night at bedtime.   11/30/2019 at Unknown time   • thiamine (VITAMIN B-1) 100 MG tablet Take 100 mg by mouth 3 (Three) Times a Day.   12/1/2019 at Unknown time   • ziprasidone (GEODON) 20 MG injection Inject 10 mg into the appropriate muscle as directed by prescriber Every 12 (Twelve) Hours As Needed for Agitation.   Past Week at Unknown time   • acetaminophen  "(TYLENOL) 325 MG tablet Take 650 mg by mouth Every 6 (Six) Hours As Needed for Mild Pain , Moderate Pain  or Fever.   Unknown at Unknown time   • loperamide (IMODIUM) 2 MG capsule Take 2 mg by mouth 4 (Four) Times a Day As Needed for Diarrhea.   Unknown at Unknown time   • magnesium hydroxide (MILK OF MAGNESIA) 400 MG/5ML suspension Take 30 mL by mouth Daily As Needed for Constipation or Heartburn.   Unknown at Unknown time   • polyethylene glycol (MIRALAX) packet Take 17 g by mouth Daily As Needed (for constipation).   Unknown at Unknown time       Scheduled Meds:  cefepime 2 g Intravenous Q8H   divalproex 250 mg Oral TID   famotidine 40 mg Oral BID   folic acid 1 mg Oral Daily   heparin (porcine) 5,000 Units Subcutaneous Q12H   insulin glargine 30 Units Subcutaneous Nightly   insulin lispro 0-9 Units Subcutaneous 4x Daily With Meals & Nightly   insulin lispro 5 Units Subcutaneous TID AC   LORazepam 1 mg Oral Nightly   metoprolol tartrate 12.5 mg Oral BID   sodium chloride 10 mL Intravenous Q12H   thiamine 100 mg Oral Daily     Continuous Infusions:   PRN Meds:.•  acetaminophen **OR** acetaminophen **OR** acetaminophen  •  acetaminophen  •  bisacodyl  •  dextrose  •  dextrose  •  glucagon (human recombinant)  •  haloperidol lactate  •  insulin lispro **AND** insulin lispro  •  lactated ringers  •  polyethylene glycol  •  prochlorperazine  •  QUEtiapine  •  sodium chloride  •  sodium chloride     Allergies:  Patient has no known allergies.      Objective     Vital Signs   /45   Pulse 70   Temp 98.6 °F (37 °C) (Oral)   Resp 16   Ht 182.9 cm (72\")   Wt 79.4 kg (175 lb)   SpO2 96%   BMI 23.73 kg/m²     Physical Exam:     General Appearance:    NAD   Head:    Normocephalic, without obvious abnormality, atraumatic   Eyes:            Lids and lashes normal, conjunctivae and sclerae normal, no   icterus, no pallor, corneas clear, PERRLA   Skin:   No bleeding, bruising or rash          Neurologic:   Cranial " nerves 2 - 12 grossly intact, sensation intact, DTR       present and equal bilaterally         Mental Status Exam:   Hygiene:   fair  Cooperation:  Guarded  Eye Contact:  Poor  Psychomotor Behavior:  Slow  Affect:  Restricted  Mood: fluctates  Hopelessness: Denies  Speech:  Minimal  Thought Process:  Unable to demonstrate  Thought Content:  Unable to demonstrate  Suicidal:  None  Homicidal:  None  Hallucinations:  Not demonstrated today  Delusion:  Unable to demonstrate  Memory:  Deficits  Orientation:  Person  Reliability:  poor  Insight:  Poor  Judgement:  Poor  Impulse Control:  Impaired  Physical/Medical Issues:  Yes Pneumonia, dementia     Medications and allergies were reviewed by this provider.     Lab Results   Component Value Date    GLUCOSE 115 (H) 12/02/2019    CALCIUM 7.8 (L) 12/02/2019     12/02/2019    K 4.0 12/02/2019    CO2 28.0 12/02/2019     12/02/2019    BUN 17 12/02/2019    CREATININE 0.84 12/02/2019    EGFRIFNONA 88 12/02/2019    BCR 20.2 12/02/2019    ANIONGAP 8.0 12/02/2019       Last Urine Toxicity     LAST URINE TOXICITY RESULTS Latest Ref Rng & Units 9/19/2018    CREATININE UR mg/dL 19.4    BARBITURATES SCREEN Negative Negative    BENZODIAZEPINE SCREEN, URINE Negative Negative    COCAINE SCREEN, URINE Negative Negative    METHADONE SCREEN, URINE Negative Negative          No results found for: PHENYTOIN, PHENOBARB, VALPROATE, CBMZ    Lab Results   Component Value Date     12/02/2019    BUN 17 12/02/2019    CREATININE 0.84 12/02/2019    TSH 1.050 09/19/2018    WBC 9.00 12/02/2019       Brief Urine Lab Results  (Last result in the past 365 days)      Color   Clarity   Blood   Leuk Est   Nitrite   Protein   CREAT   Urine HCG        12/01/19 1702 Red Cloudy Large (3+) Moderate (2+) Negative 100 mg/dL (2+)               ECG/EMG Results (last 72 hours)     ** No results found for the last 72 hours. **            Assessment/Plan       Sepsis due to pneumonia (CMS/[facility])     Uncontrolled type 2 diabetes mellitus with hyperglycemia (CMS/[facility])    Hyperlipidemia    HCAP (healthcare-associated pneumonia)    Primary hypertension    Cellulitis of right lower extremity       LABS: Reviewed    Assessment:   Delirium due to medical condition (Pnuemonia)  Alzheimer's dementia with behavioral disturbances.    Treatment Plan: Patient is alert but oriented only to person, confused.  Per nursing, he has been cooperative with no significant aggression or agitation.  Continue Depakote 250 mg 3 times daily.  Continue lorazepam Ativan 1 mg nightly  Continue Seroquel as needed mild agitation and Haldol IV for the severe agitation.  Will follow      Treatment Plan discussed with: Patient, Family and Nursing      I discussed the patients findings and my recommendations with patient, family and nursing staff    I have reviewed and approved the behavioral health treatment plans and problem list. Yes  Thank you for the consult   Referring MD has access to the consult report and progress notes in EMR     Valery Mauro PA-C  12/02/19  1:34 PM

## 2019-12-02 NOTE — PROGRESS NOTES
Discharge Planning Assessment  [facility]     Patient Name: Yuriy Bennett  MRN: [id number]  Today's Date: 12/2/2019    Admit Date: 12/1/2019    Discharge Needs Assessment     Row Name 12/02/19 1402       Living Environment    Lives With  facility resident    Current Living Arrangements  other (see comments)    Provides Primary Care For  no one, unable/limited ability to care for self    Family Caregiver if Needed  spouse    Family Caregiver Names  Spouse- Dorys Bennett    Quality of Family Relationships  supportive    Able to Return to Prior Arrangements  yes       Resource/Environmental Concerns    Resource/Environmental Concerns  none       Transition Planning    Patient/Family Anticipates Transition to  inpatient rehabilitation facility    Patient/Family Anticipated Services at Transition  skilled nursing       Discharge Needs Assessment    Readmission Within the Last 30 Days  no previous admission in last 30 days    Concerns to be Addressed  care coordination/care conferences    Anticipated Changes Related to Illness  none    Equipment Needed After Discharge  none    Provided post acute provider list?  Refused        Discharge Plan     Row Name 12/02/19 9298       Plan    Plan  PT/OT evals pending. From [facility]. Pt's spouse interested in rehab in [facility]) or [facility] once she tours. Will require PASRR and precert for rehab.    Provided post acute provider list?  -- Offered in rehab list, patient's spouse declined because she said [facility] were too far as she lived in [place].    Patient/Family in Agreement with Plan  yes    Plan Comments  DC Barriers: Psych eval pending. Spoke to patient's spouse who states patient started out at their house and he fell, ended up at [facility], went to rehab at [facility] and the director Guerrero from [facility] sent patient to [facility] in [place] that is new and she states is for people with  anger management. She states patient never should have been sent there and she had several complaints about when he fell while he was there, had a fever, and barely had a pulse. States he would have probably  if he did not come into the hospital. She said she needs to tour [facility] to decide which rehab she wants patient to go to at [place]. Notified MSW to see about getting [place] rehab list. Patient's spouse Dorys states they have been  for 60 years and wants called at [phone] with any updates.        Demographic Summary     Row Name 19 1401       General Information    Admission Type  inpatient    Reason for Consult  discharge planning    Preferred Language  English     Used During This Interaction  merary Grubbs RN       Office Phone: [phone]  Office Cell: [phone]

## 2019-12-02 NOTE — H&P
[facility] HOSPITALIST     Carolina Tran FNP    Patient Care Team:  Carolina Tran FNP as PCP - General (Family Medicine)    CHIEF COMPLAINT:     Chief Complaint   Patient presents with   • Altered Mental Status       HISTORY OF PRESENT ILLNESS:    79-year-old male sent in from a mini home after he was noted to have altered mental status after lunch.  The patient apparently had been evaluated 10 days ago for worsening confusion and delirium but had a negative work-up at that time.  His blood sugar was reportedly normal.  There is no documented elevation of fever or chills although the patient's significant other noticed that he felt warm.  The patient apparently has recently been started on valproic acid for dementia features and no recent levels were reported.  Patient also apparently receives lorazepam for generalized anxiety     History from the wife.  She was generally unsatisfied with care at the psych inpatient facility.  Unknown why he was admitted over there.  Prior to that he was in nursing home and before that he was at [facility].  She states that he has not been home in about 2 months.  Mostly bedbound.  Needs plenty of help.  Known history of prostate cancer in the past with seed implantation.    Past Medical History:   Diagnosis Date   • Alzheimer disease (CMS/HCC)    • Cancer (CMS/HCC)     prostate   • Diabetes mellitus (CMS/[facility])    • Hypertension      History reviewed. No pertinent surgical history.  History reviewed. No pertinent family history.  Social History     Tobacco Use   • Smoking status: Never Smoker   • Smokeless tobacco: Never Used   Substance Use Topics   • Alcohol use: No   • Drug use: Defer     Medications Prior to Admission   Medication Sig Dispense Refill Last Dose   • divalproex (DEPAKOTE) 125 MG DR tablet Take 250 mg by mouth 3 (Three) Times a Day.   12/1/2019 at Unknown time   • famotidine (PEPCID) 20 MG tablet Take 40 mg by mouth 2 (Two) Times a Day.    12/1/2019 at Unknown time   • folic acid (FOLVITE) 1 MG tablet Take 1 mg by mouth Daily.   12/1/2019 at Unknown time   • haloperidol lactate (HALDOL) 5 MG/ML injection Inject 5 mg into the appropriate muscle as directed by prescriber Every 6 (Six) Hours As Needed for Agitation.   Past Week at Unknown time   • insulin glargine (LANTUS) 100 UNIT/ML injection Inject 15 Units under the skin into the appropriate area as directed Daily.      • insulin lispro (humaLOG) 100 UNIT/ML injection Inject  under the skin into the appropriate area as directed 4 (Four) Times a Day After Meals & at Bedtime. Sliding scale   12/1/2019 at Unknown time   • insulin lispro (humaLOG) 100 UNIT/ML injection Inject 5 Units under the skin into the appropriate area as directed 3 (Three) Times a Day Before Meals.   12/1/2019 at Unknown time   • LORazepam (ATIVAN) 1 MG tablet Take 1 mg by mouth every night at bedtime.   11/30/2019 at Unknown time   • LORazepam (ATIVAN) 2 MG/ML injection Inject 1 mg into the appropriate muscle as directed by prescriber Every 6 (Six) Hours As Needed for Anxiety.   Past Week at Unknown time   • metoprolol tartrate (LOPRESSOR) 25 MG tablet Take 12.5 mg by mouth 2 (Two) Times a Day.   12/1/2019 at Unknown time   • QUEtiapine (SEROquel) 25 MG tablet Take 25 mg by mouth Every 4 (Four) Hours As Needed (for agitation).   Past Week at Unknown time   • temazepam (RESTORIL) 15 MG capsule Take 15 mg by mouth every night at bedtime.   11/30/2019 at Unknown time   • thiamine (VITAMIN B-1) 100 MG tablet Take 100 mg by mouth 3 (Three) Times a Day.   12/1/2019 at Unknown time   • ziprasidone (GEODON) 20 MG injection Inject 10 mg into the appropriate muscle as directed by prescriber Every 12 (Twelve) Hours As Needed for Agitation.   Past Week at Unknown time   • acetaminophen (TYLENOL) 325 MG tablet Take 650 mg by mouth Every 6 (Six) Hours As Needed for Mild Pain , Moderate Pain  or Fever.   Unknown at Unknown time   • loperamide  "(IMODIUM) 2 MG capsule Take 2 mg by mouth 4 (Four) Times a Day As Needed for Diarrhea.   Unknown at Unknown time   • magnesium hydroxide (MILK OF MAGNESIA) 400 MG/5ML suspension Take 30 mL by mouth Daily As Needed for Constipation or Heartburn.   Unknown at Unknown time   • polyethylene glycol (MIRALAX) packet Take 17 g by mouth Daily As Needed (for constipation).   Unknown at Unknown time     Allergies:  Patient has no known allergies.    There is no immunization history for the selected administration types on file for this patient.        REVIEW OF SYSTEMS:     Review of Systems   Constitution: Positive for fever.   HENT: Negative.    Eyes: Negative.    Cardiovascular: Negative.    Respiratory: Negative.    Endocrine: Negative.    Hematologic/Lymphatic: Negative.    Skin: Negative.    Musculoskeletal: Negative.    Gastrointestinal: Negative.    Genitourinary: Negative.    Neurological: Negative.    Psychiatric/Behavioral: Positive for altered mental status.   Allergic/Immunologic: Negative.    All other systems reviewed and are negative.      Vital Signs  Temp:  [98.8 °F (37.1 °C)-102.6 °F (39.2 °C)] 98.8 °F (37.1 °C)  Heart Rate:  [] 97  Resp:  [18] 18  BP: (120-157)/(53-73) 157/66    Flowsheet Rows      First Filed Value   Admission Height  182.9 cm (72\") Documented at 12/01/2019 1411   Admission Weight  79.4 kg (175 lb) Documented at 12/01/2019 1411           Physical Exam:    Physical Exam   Constitutional: He is oriented to person, place, and time. He appears well-developed and well-nourished. No distress.   HENT:   Head: Normocephalic and atraumatic.   Right Ear: External ear normal.   Left Ear: External ear normal.   Nose: Nose normal.   Mouth/Throat: Oropharynx is clear and moist. No oropharyngeal exudate.   Eyes: Conjunctivae and EOM are normal. Pupils are equal, round, and reactive to light. Right eye exhibits no discharge. Left eye exhibits no discharge. No scleral icterus.   Neck: Normal range " of motion. No JVD present. No tracheal deviation present. No thyromegaly present.   Cardiovascular: Normal rate, regular rhythm and normal heart sounds. Exam reveals no gallop and no friction rub.   No murmur heard.  +2 leg edema below knees bilateral   Pulmonary/Chest: Effort normal. No stridor. No respiratory distress. He has no wheezes. He has rales. He exhibits no tenderness.   Left lower lobe Rales and good air entry.   Abdominal: Soft. Bowel sounds are normal. He exhibits no distension and no mass. There is no tenderness. There is no rebound and no guarding. No hernia.   Musculoskeletal: Normal range of motion. He exhibits no edema, tenderness or deformity.   Lymphadenopathy:     He has no cervical adenopathy.   Neurological: He is alert and oriented to person, place, and time. No cranial nerve deficit or sensory deficit. He exhibits normal muscle tone. Coordination normal.   Answers basic questions.  Oriented to self.  Knows wife but does not know her name.-The extent of is oriented his orientation.   Skin: Skin is warm and dry. No rash noted. He is not diaphoretic. There is erythema.   Skin of bilateral lower extremities below the knee and is red and erythematous  Onychomycosis present multiple toes bilaterally   Psychiatric: He has a normal mood and affect. His behavior is normal.   Nursing note and vitals reviewed.          Emotional Behavior:   Able to assess   Debilities:  Mostly bedbound    Results Review:    I reviewed the patient's new clinical results.      Results from last 7 days   Lab Units 12/01/19  1436   WBC 10*3/mm3 9.50   HEMOGLOBIN g/dL 11.1*   HEMATOCRIT % 33.1*   PLATELETS 10*3/mm3 359     Results from last 7 days   Lab Units 12/01/19  1436   SODIUM mmol/L 134*   POTASSIUM mmol/L 4.8   CHLORIDE mmol/L 97*   CO2 mmol/L 27.0   BUN mg/dL 18   CREATININE mg/dL 0.96   GLUCOSE mg/dL 272*   CALCIUM mg/dL 8.4*     Results from last 7 days   Lab Units 12/01/19  1436   SODIUM mmol/L 134*   POTASSIUM  mmol/L 4.8   CHLORIDE mmol/L 97*   CO2 mmol/L 27.0   BUN mg/dL 18   CREATININE mg/dL 0.96   CALCIUM mg/dL 8.4*   BILIRUBIN mg/dL 0.2   ALK PHOS U/L 124*   ALT (SGPT) U/L 13   AST (SGOT) U/L 21   GLUCOSE mg/dL 272*         Lab Results   Component Value Date    CALCIUM 8.4 (L) 12/01/2019     No results found for: HGBA1C  No results found for: CHOL, CHLPL, TRIG, HDL, LDL, LDLDIRECT  No results found for: LIPASE        No results found for: INTRAOP, PREDX, FINALDX, COMDX    Microbiology Results (last 10 days)     ** No results found for the last 240 hours. **          ECG/EMG Results (most recent)     None                    Xr Chest 1 View    Result Date: 12/1/2019  1. 2 cm nodular opacity in the left upper chest is most likely due to calcified cartilage at the left first rib and. Recommend PA and lateral when possible to confirm. 2. Mild left basal infiltrate versus atelectasis.  Electronically Signed By-Joelle Luna On:12/1/2019 2:56 PM This report was finalized on [id number] by  Joelle Luna, .        Assessment/Plan     * Sepsis due to pneumonia (CMS/HCC)- (present on admission)    Hydration  Empiric antibiotics  Follow lactate  Follow cultures  Supportive treatment     HCAP (healthcare-associated pneumonia)- (present on admission)    Empiric antibiotics  Supportive treatment  Follow cultures     Cellulitis of right lower extremity- (present on admission)    Continue cefepime being used for pneumonia.  Otherwise has been on Keflex outpatient.     Primary hypertension- (present on admission)    Continue home medications  PRN medications have not been shown to affect outcomes       Hyperlipidemia- (present on admission)    I would be in favor of deep prescribing statins on account of multiple comorbidities and competing causes of mortality     Uncontrolled type 2 diabetes mellitus with hyperglycemia (CMS/HCC)- (present on admission)    Hydration  Insulin basal bolus       Resolved Hospital Problems:  No notes  have been filed under this hospital service.  Service: Hospitalist      Estimated Creatinine Clearance: 68.9 mL/min (by C-G formula based on SCr of 0.96 mg/dL).    Code Status and Medical Interventions:   Ordered at: 12/01/19 1925     Code Status:    CPR     Medical Interventions (Level of Support Prior to Arrest):    Full       I personally reviewed patient's x-ray films and my findings are: 0    I personally reviewed patient's EKG strip and my findings are: 0    Plan    Care coordination with nursing for current management.  Discussed with wife at length.  Will need nursing home placement on discharge 12/01/19 7:30 PM    Disposition        Destination      No service coordination in this encounter.      Durable Medical Equipment      No service coordination in this encounter.      Dialysis/Infusion      No service coordination in this encounter.      Home Medical Care      No service coordination in this encounter.      Therapy      No service coordination in this encounter.      Community Resources      No service coordination in this encounter.            Luiz Hansen MD  12/01/19  7:30 PM

## 2019-12-02 NOTE — CONSULTS
"Diabetes Education  Assessment/Teaching    Patient Name:  Yuriy Bennett  YOB: 1940  MRN: [id number]  Admit Date:  12/1/2019      Assessment Date:  12/2/2019    Most Recent Value   General Information    Referral From:  A1c, Other (comment) [Admission blood sugar 272]   Height  182.9 cm (72\")   Weight  79.4 kg (175 lb)   Weight Method  Estimated   Pregnancy Assessment   Diabetes History   Current DM knowledge  poor   Education Preferences   What areas of diabetes would you like to learn about?  avoiding high blood sugar   Nutrition Information   Assessment Topics   Problem Solving - Assessment  Needs education   DM Goals   Problem Solving - Goal  Today            Most Recent Value   DM Education Needs   Motivation  Moderate   Teaching Method  Discussion   Patient Response  Verbalized understanding [Patient has dementia.  Spoke with wife at the bedside.]            Other Comments:  Wife at bedside and states patient will have to always be at an ECF.  Spoke with wife about admission blood sugar of 272.  Wife states that the facility her  was at was feeding him pies and all kinds of sweets.  Discussed with wife about finding out about diet at the ECF.  Wife is choosing a different facility for patient to go.        Electronically signed by:  Harleen Nicole RN  12/02/19 12:52 PM  "

## 2019-12-02 NOTE — SEPSIS FOCUSED EXAM
Attempted to evaluate pt at 01:00 however labs were still pending.  Contacted RN to request recheck of VS at time of lab draw.      Vital signs reported as temperature: 100.1, pulse: 81, respirations: 20, blood pressure: 110/59, O2 sat: 95%.      Patient's most recent labs relevant for: lactate: 1.1 (previous result 2.2), WBCs: 9.00.    Physical exam notable for     Patient who is alert but remains confused (wife at bedside states patient typically more confused at night for the past several months).    Lungs: Patient appears to have normal respiratory rate and effort, lung sounds noted with mild rales in the left base at the end of exhalation.    Cardiovascular: Regular rate and rhythm without murmurs rubs or gallops noted.  No JVD present on exam.  Patient does still have some bilateral pitting edema with areas of erythema and warmth which wife states are improving.    Skin: Appears warm, dry and well perfused throughout

## 2019-12-03 NOTE — PROGRESS NOTES
Continued Stay Note  [facility]     Patient Name: Yuriy Bennett  MRN: [id number]  Today's Date: 12/3/2019    Admit Date: 12/1/2019    Discharge Plan     Row Name 12/03/19 1632       Plan    Plan  DC Plan: PT/OT recommending rehab. Spouse to tour [facility] 12/3, then let SW know decision. PASRR approved. Would require precert. Needs to remain free of sitter/restraints for 24 hours prior to d/c (restraints off 12/3 @ 11:15am).     Provided post acute provider list?  Yes Attempted to give [facility] booklet, spouse had one already.    Post Acute Provider Lists  Inpatient Rehab    Post Acuite Provider List  Attempted    Delivered To  Support Person    Support Person  spouse, Dorys    Method of Delivery  In person    Patient/Family in Agreement with Plan  yes    Plan Comments  SW called [facility] ([phone]) facility to check on bed availability & inform of a potential referral pending spouse's decision. Left voicemail at 1632 on 12/3 for call back from admissions liaison.      SAILAJA Ramos    Phone: [phone]  Cell: [phone]  Fax: [phone]  [email or web address]

## 2019-12-03 NOTE — PROGRESS NOTES
"  Chief complaint \"Tell me what I have to do today\"    Subjective .     History of present illness:  The patient is a 79 y.o.white male with PMH significant for Alzheimers dementia, prostate cancer, Diabetes, and HTN who was admitted secondary to Mental status changes.  Psych was consulted by Dr. Hansen.  Mental status changes.  Patient is a poor historian.  His wife is at bedside and able to help with history.  She reports that he was diagnosed with Alzheimer's dementia in 2017.  He has continued to decline, has some behavioral issues and was sent from rehab to [facility].  His wife spoke poorly of the new facility in their care for her .  He is currently on Depakote and lorazepam for anxiety and agitation issues.  His wife says that prior to his diagnosis of dementia, he had no previous psychiatric history.  He had never been on any psychiatric medication for now.  His wife says that at his baseline, he still recognizes her, but today he is very confused and does not recognize her, does not know where he is, his date of birth, the current date.  He reported that he had no children, but his wife says they have 2.    Today, the patient is more alert, oriented to person only.  He still has significant confusion and memory loss, did not remember who his wife was, although his behavior significantly improved today when she returned.  She went home for the night and the patient was agitated and aggressive, pulling out IV lines, had to be restrained.    Review of Systems   Unable to perform ROS: Dementia       History      Medications Prior to Admission   Medication Sig Dispense Refill Last Dose   • divalproex (DEPAKOTE) 125 MG DR tablet Take 250 mg by mouth 3 (Three) Times a Day.   12/1/2019 at Unknown time   • famotidine (PEPCID) 20 MG tablet Take 40 mg by mouth 2 (Two) Times a Day.   12/1/2019 at Unknown time   • folic acid (FOLVITE) 1 MG tablet Take 1 mg by mouth Daily.   12/1/2019 at Unknown time   • " haloperidol lactate (HALDOL) 5 MG/ML injection Inject 5 mg into the appropriate muscle as directed by prescriber Every 6 (Six) Hours As Needed for Agitation.   Past Week at Unknown time   • insulin glargine (LANTUS) 100 UNIT/ML injection Inject 15 Units under the skin into the appropriate area as directed Daily.      • insulin lispro (humaLOG) 100 UNIT/ML injection Inject  under the skin into the appropriate area as directed 4 (Four) Times a Day After Meals & at Bedtime. Sliding scale   12/1/2019 at Unknown time   • insulin lispro (humaLOG) 100 UNIT/ML injection Inject 5 Units under the skin into the appropriate area as directed 3 (Three) Times a Day Before Meals.   12/1/2019 at Unknown time   • LORazepam (ATIVAN) 1 MG tablet Take 1 mg by mouth every night at bedtime.   11/30/2019 at Unknown time   • LORazepam (ATIVAN) 2 MG/ML injection Inject 1 mg into the appropriate muscle as directed by prescriber Every 6 (Six) Hours As Needed for Anxiety.   Past Week at Unknown time   • metoprolol tartrate (LOPRESSOR) 25 MG tablet Take 12.5 mg by mouth 2 (Two) Times a Day.   12/1/2019 at Unknown time   • QUEtiapine (SEROquel) 25 MG tablet Take 25 mg by mouth Every 4 (Four) Hours As Needed (for agitation).   Past Week at Unknown time   • temazepam (RESTORIL) 15 MG capsule Take 15 mg by mouth every night at bedtime.   11/30/2019 at Unknown time   • thiamine (VITAMIN B-1) 100 MG tablet Take 100 mg by mouth 3 (Three) Times a Day.   12/1/2019 at Unknown time   • ziprasidone (GEODON) 20 MG injection Inject 10 mg into the appropriate muscle as directed by prescriber Every 12 (Twelve) Hours As Needed for Agitation.   Past Week at Unknown time   • acetaminophen (TYLENOL) 325 MG tablet Take 650 mg by mouth Every 6 (Six) Hours As Needed for Mild Pain , Moderate Pain  or Fever.   Unknown at Unknown time   • loperamide (IMODIUM) 2 MG capsule Take 2 mg by mouth 4 (Four) Times a Day As Needed for Diarrhea.   Unknown at Unknown time   •  "magnesium hydroxide (MILK OF MAGNESIA) 400 MG/5ML suspension Take 30 mL by mouth Daily As Needed for Constipation or Heartburn.   Unknown at Unknown time   • polyethylene glycol (MIRALAX) packet Take 17 g by mouth Daily As Needed (for constipation).   Unknown at Unknown time       Scheduled Meds:  divalproex 250 mg Oral TID   famotidine 40 mg Oral BID   folic acid 1 mg Oral Daily   heparin (porcine) 5,000 Units Subcutaneous Q12H   insulin glargine 30 Units Subcutaneous Nightly   insulin lispro 0-9 Units Subcutaneous 4x Daily With Meals & Nightly   insulin lispro 5 Units Subcutaneous TID AC   LORazepam 1 mg Oral Nightly   metoprolol tartrate 12.5 mg Oral BID   piperacillin-tazobactam 3.375 g Intravenous Q8H   sodium chloride 10 mL Intravenous Q12H   thiamine 100 mg Oral Daily     Continuous Infusions:   PRN Meds:.•  acetaminophen **OR** acetaminophen **OR** acetaminophen  •  acetaminophen  •  bisacodyl  •  dextrose  •  dextrose  •  glucagon (human recombinant)  •  haloperidol lactate  •  insulin lispro **AND** insulin lispro  •  lactated ringers  •  polyethylene glycol  •  prochlorperazine  •  QUEtiapine  •  sodium chloride  •  sodium chloride     Allergies:  Patient has no known allergies.      Objective     Vital Signs   /88   Pulse 87   Temp 98.7 °F (37.1 °C) (Oral)   Resp 18   Ht 182.9 cm (72\")   Wt 63.4 kg (139 lb 12.4 oz) Comment: bed needs zeroed.  SpO2 97%   BMI 18.96 kg/m²     Physical Exam:     General Appearance:    NAD   Head:    Normocephalic, without obvious abnormality, atraumatic   Eyes:            Lids and lashes normal, conjunctivae and sclerae normal, no   icterus, no pallor, corneas clear, PERRLA   Skin:   No bleeding, bruising or rash          Neurologic:   Cranial nerves 2 - 12 grossly intact, sensation intact, DTR       present and equal bilaterally         Mental Status Exam:   Hygiene:   fair  Cooperation:  Cooperative  Eye Contact:  Fair  Psychomotor Behavior:  Slow  Affect:  " Blunted  Mood: normal  Hopelessness: Denies  Speech:  Normal  Thought Process:  Unable to demonstrate  Thought Content:  Unable to demonstrate  Suicidal:  None  Homicidal:  None  Hallucinations:  Not demonstrated today  Delusion:  Unable to demonstrate  Memory:  Deficits  Orientation:  Person  Reliability:  poor  Insight:  Poor  Judgement:  Poor  Impulse Control:  Impaired  Physical/Medical Issues:  Yes Multiple     Medications and allergies were reviewed by this provider.    Lab Results   Component Value Date    GLUCOSE 55 (L) 12/03/2019    CALCIUM 8.2 (L) 12/03/2019     12/03/2019    K 3.3 (L) 12/03/2019    CO2 29.0 12/03/2019     12/03/2019    BUN 14 12/03/2019    CREATININE 0.74 (L) 12/03/2019    EGFRIFNONA 102 12/03/2019    BCR 18.9 12/03/2019    ANIONGAP 9.0 12/03/2019       Last Urine Toxicity     LAST URINE TOXICITY RESULTS Latest Ref Rng & Units 9/19/2018    CREATININE UR mg/dL 19.4    BARBITURATES SCREEN Negative Negative    BENZODIAZEPINE SCREEN, URINE Negative Negative    COCAINE SCREEN, URINE Negative Negative    METHADONE SCREEN, URINE Negative Negative          No results found for: PHENYTOIN, PHENOBARB, VALPROATE, CBMZ    Lab Results   Component Value Date     12/03/2019    BUN 14 12/03/2019    CREATININE 0.74 (L) 12/03/2019    TSH 1.050 09/19/2018    WBC 7.60 12/03/2019       Brief Urine Lab Results  (Last result in the past 365 days)      Color   Clarity   Blood   Leuk Est   Nitrite   Protein   CREAT   Urine HCG        12/01/19 1702 Red Cloudy Large (3+) Moderate (2+) Negative 100 mg/dL (2+)               Assessment/Plan       Sepsis due to pneumonia (CMS/HCC)    Uncontrolled type 2 diabetes mellitus with hyperglycemia (CMS/HCC)    Hyperlipidemia    HCAP (healthcare-associated pneumonia)    Primary hypertension    Cellulitis of right lower extremity    Dementia without behavioral disturbance (CMS/HCC)    Toxic metabolic encephalopathy       LABS: Reviewed    Assessment:   Delirium  due to medical condition (pneumonia)  Alzheimer's dementia with behavioral disturbances    Treatment Plan:   Patient is alert and oriented only to person, remains confused.  He was agitated and aggressive last night and earlier this morning prior to his wife returning to the hospital.  He is now calm and cooperative.  Continue Depakote 250 mg 3 times daily  Continue lorazepam 1 mg nightly  Continue Seroquel as needed for mild agitation and Haldol IV for the severe agitation   is helping the family with rehab placement  Will follow as needed.    Treatment Plan discussed with: Patient and Family    I discussed the patients findings and my recommendations with patient, family and nursing staff    I have reviewed and approved the behavioral health treatment plans and problem list. Yes     Referring MD has access to consult report and progress notes in EMR     Valery Mauro PA-C  12/03/19  3:42 PM

## 2019-12-03 NOTE — PROGRESS NOTES
[facility] Medicine Services Daily Progress Note      Hospitalist Team  LOS 2 days      Patient Care Team:  Carolina Tran FNP as PCP - General (Family Medicine)    Patient Location: 2124/1      Subjective   Subjective     Chief Complaint / Subjective  Chief Complaint   Patient presents with   • Altered Mental Status       HPI limited with confusion and dementia. Was agitated last night and needed restraints. At time of exam somewhat uncooperative but did allow for physical exam.     Brief Synopsis of Hospital Course/HPI  79-year-old male sent in from a mini home after he was noted to have altered mental status after lunch.  The patient apparently had been evaluated 10 days ago for worsening confusion and delirium but had a negative work-up at that time.  His blood sugar was reportedly normal.  There is no documented elevation of fever or chills although the patient's significant other noticed that he felt warm.  The patient apparently has recently been started on valproic acid for dementia features and no recent levels were reported.  Patient also apparently receives lorazepam for generalized anxiety     History from the wife.  She was generally unsatisfied with care at the [facility] inpatient facility.  Unknown why he was admitted over there.  Prior to that he was in nursing home and before that he was at [facility].  She states that he has not been home in about 2 months.  Mostly bedbound.  Needs plenty of help.  Known history of prostate cancer in the past with seed implantation.          Review of Systems   Unable to perform ROS: dementia         Objective   Objective      Vital Signs  Temp:  [97.9 °F (36.6 °C)-100.6 °F (38.1 °C)] 98.7 °F (37.1 °C)  Heart Rate:  [] 87  Resp:  [16-20] 18  BP: (110-145)/(66-88) 145/88  Oxygen Therapy  SpO2: 97 %  Pulse Oximetry Type: Continuous  Device (Oxygen Therapy): room air  Flow (L/min): 2  Flowsheet Rows      First Filed Value   Admission  "Height  182.9 cm (72\") Documented at 12/01/2019 1411   Admission Weight  79.4 kg (175 lb) Documented at 12/01/2019 1411        Intake & Output (last 3 days)       11/30 0701 - 12/01 0700 12/01 0701 - 12/02 0700 12/02 0701 - 12/03 0700 12/03 0701 - 12/04 0700    P.O.   360 480    Total Intake(mL/kg)   360 (5.7) 480 (7.6)    Urine (mL/kg/hr)  925  200 (0.3)    Stool    0    Total Output  925  200    Net  -925 +360 +280            Urine Unmeasured Occurrence   1 x 1 x    Stool Unmeasured Occurrence    1 x        Lines, Drains & Airways    Active LDAs     None                  Physical Exam:    Physical Exam   Constitutional: No distress.   HENT:   Head: Normocephalic.   Eyes: Pupils are equal, round, and reactive to light.   Pulmonary/Chest: Effort normal.   Diminished BS at the bases   Abdominal: Soft.   Neurological: He is alert.   Oriented x1   Psychiatric:   Agitated, confused          Results Review:     I reviewed the patient's new clinical results.      Lab Results (last 24 hours)     Procedure Component Value Units Date/Time    POC Glucose Once [[id number]]  (Normal) Collected:  12/03/19 1616    Specimen:  Blood Updated:  12/03/19 1618     Glucose 93 mg/dL      Comment: Serial Number: [id number]:  [id number]       Blood Culture - Blood, Arm, Right [[id number]] Collected:  12/01/19 1449    Specimen:  Blood from Arm, Right Updated:  12/03/19 1500     Blood Culture No growth at 2 days    Blood Culture - Blood, Arm, Left [[id number]] Collected:  12/01/19 1436    Specimen:  Blood from Arm, Left Updated:  12/03/19 1445     Blood Culture No growth at 2 days    POC Glucose Once [[id number]]  (Abnormal) Collected:  12/03/19 1129    Specimen:  Blood Updated:  12/03/19 1138     Glucose 157 mg/dL      Comment: Serial Number: [id number]:  [id number]       POC Glucose Once [[id number]]  (Abnormal) Collected:  12/03/19 0802    Specimen:  Blood Updated:  12/03/19 0804     Glucose 111 mg/dL      Comment: Serial Number: " [id number]:  [id number]       POC Glucose Once [[id number]]  (Abnormal) Collected:  12/03/19 0719    Specimen:  Blood Updated:  12/03/19 0721     Glucose 40 mg/dL      Comment: Serial Number: [id number]:  [id number]       Basic Metabolic Panel [[id number]]  (Abnormal) Collected:  12/03/19 0542    Specimen:  Blood Updated:  12/03/19 0652     Glucose 55 mg/dL      BUN 14 mg/dL      Creatinine 0.74 mg/dL      Sodium 140 mmol/L      Potassium 3.3 mmol/L      Chloride 102 mmol/L      CO2 29.0 mmol/L      Calcium 8.2 mg/dL      eGFR Non African Amer 102 mL/min/1.73      BUN/Creatinine Ratio 18.9     Anion Gap 9.0 mmol/L     Narrative:       GFR Normal >60  Chronic Kidney Disease <60  Kidney Failure <15    POC Glucose Once [[id number]]  (Abnormal) Collected:  12/03/19 0422    Specimen:  Blood Updated:  12/03/19 0423     Glucose 150 mg/dL      Comment: Serial Number: [id number]:  [id number]       POC Glucose Once [[id number]]  (Abnormal) Collected:  12/03/19 0404    Specimen:  Blood Updated:  12/03/19 0405     Glucose 31 mg/dL      Comment: Serial Number: [id number]:  [id number]       CBC & Differential [[id number]] Collected:  12/03/19 0305    Specimen:  Blood Updated:  12/03/19 0344    Narrative:       The following orders were created for panel order CBC & Differential.  Procedure                               Abnormality         Status                     ---------                               -----------         ------                     CBC Auto Differential[[id number]]        Abnormal            Final result                 Please view results for these tests on the individual orders.    CBC Auto Differential [[id number]]  (Abnormal) Collected:  12/03/19 0305    Specimen:  Blood Updated:  12/03/19 0344     WBC 7.60 10*3/mm3      RBC 3.26 10*6/mm3      Hemoglobin 10.2 g/dL      Hematocrit 30.8 %      MCV 94.7 fL      MCH 31.4 pg      MCHC 33.2 g/dL      RDW 15.6 %      RDW-SD 52.5 fl      MPV 6.6 fL       Platelets 303 10*3/mm3      Neutrophil % 70.0 %      Lymphocyte % 18.1 %      Monocyte % 10.9 %      Eosinophil % 0.3 %      Basophil % 0.7 %      Neutrophils, Absolute 5.30 10*3/mm3      Lymphocytes, Absolute 1.40 10*3/mm3      Monocytes, Absolute 0.80 10*3/mm3      Eosinophils, Absolute 0.00 10*3/mm3      Basophils, Absolute 0.10 10*3/mm3      nRBC 0.0 /100 WBC     POC Glucose Once [[id number]]  (Abnormal) Collected:  12/02/19 2217    Specimen:  Blood Updated:  12/02/19 2219     Glucose 193 mg/dL      Comment: Serial Number: [id number]:  [id number]       POC Glucose Once [[id number]]  (Abnormal) Collected:  12/02/19 1639    Specimen:  Blood Updated:  12/02/19 1642     Glucose 185 mg/dL      Comment: Serial Number: [id number]:  [id number]           No results found for: HGBA1C                Microbiology Results (last 10 days)     Procedure Component Value - Date/Time    Respiratory Panel, PCR - Swab, Nasopharynx [[id number]]  (Abnormal) Collected:  12/01/19 2029    Lab Status:  Final result Specimen:  Swab from Nasopharynx Updated:  12/01/19 2154     ADENOVIRUS, PCR Not Detected     Coronavirus 229E Not Detected     Coronavirus HKU1 Not Detected     Coronavirus NL63 Not Detected     Coronavirus OC43 Not Detected     Human Metapneumovirus Detected     Human Rhinovirus/Enterovirus Not Detected     Influenza B PCR Not Detected     Parainfluenza Virus 1 Not Detected     Parainfluenza Virus 2 Not Detected     Parainfluenza Virus 3 Not Detected     Parainfluenza Virus 4 Not Detected     Bordetella pertussis pcr Not Detected     Influenza A H1 2009 PCR Not Detected     Chlamydophila pneumoniae PCR Not Detected     Mycoplasma pneumo by PCR Not Detected     Influenza A PCR Not Detected     Influenza A H3 Not Detected     Influenza A H1 Not Detected     RSV, PCR Not Detected    Urine Culture - Urine, Urine, Catheter [[id number]]  (Abnormal) Collected:  12/01/19 1702    Lab Status:  Final result Specimen:   Urine, Catheter Updated:  12/02/19 1328     Urine Culture Yeast isolated     Comment: No further work up.       Blood Culture - Blood, Arm, Right [[id number]] Collected:  12/01/19 1449    Lab Status:  Preliminary result Specimen:  Blood from Arm, Right Updated:  12/03/19 1500     Blood Culture No growth at 2 days    Blood Culture - Blood, Arm, Left [[id number]] Collected:  12/01/19 1436    Lab Status:  Preliminary result Specimen:  Blood from Arm, Left Updated:  12/03/19 1445     Blood Culture No growth at 2 days          ECG/EMG Results (most recent)     None                    Xr Chest 1 View    Result Date: 12/2/2019   1. Moderate interval worsening from December 1 2. Bilateral perihilar pneumonia is mild to moderate in degree along with change suggesting low-grade failure  Electronically Signed By-Yunier Coronel Jr. On:12/2/2019 7:24 AM This report was finalized on [id number] by  Yunier Coronel Jr., .    Xr Chest 1 View    Result Date: 12/1/2019  1. 2 cm nodular opacity in the left upper chest is most likely due to calcified cartilage at the left first rib and. Recommend PA and lateral when possible to confirm. 2. Mild left basal infiltrate versus atelectasis.  Electronically Signed By-Joelle Luna On:12/1/2019 2:56 PM This report was finalized on [id number] by  Joelle Luna, .      Xrays, labs reviewed personally by physician.    Medication Review:   I have reviewed the patient's current medication list      Scheduled Meds    divalproex 250 mg Oral TID   famotidine 40 mg Oral BID   folic acid 1 mg Oral Daily   heparin (porcine) 5,000 Units Subcutaneous Q12H   insulin glargine 30 Units Subcutaneous Nightly   insulin lispro 0-9 Units Subcutaneous 4x Daily With Meals & Nightly   insulin lispro 5 Units Subcutaneous TID AC   LORazepam 1 mg Oral Nightly   metoprolol tartrate 12.5 mg Oral BID   piperacillin-tazobactam 3.375 g Intravenous Q8H   sodium chloride 10 mL Intravenous Q12H   thiamine 100 mg Oral Daily        Meds Infusions       Meds PRN  •  acetaminophen **OR** acetaminophen **OR** acetaminophen  •  acetaminophen  •  bisacodyl  •  dextrose  •  dextrose  •  glucagon (human recombinant)  •  haloperidol lactate  •  insulin lispro **AND** insulin lispro  •  lactated ringers  •  polyethylene glycol  •  prochlorperazine  •  QUEtiapine  •  sodium chloride  •  sodium chloride    I personally reviewed patient's EKG    Assessment/Plan   Assessment/Plan     Active Hospital Problems:  * Sepsis due to pneumonia (CMS/HCC)- (present on admission)    Resolving  Cx negative thus far      HCAP (healthcare-associated pneumonia)- (present on admission)    Empiric antibiotics  Supportive treatment  Cx negative thus far      Toxic metabolic encephalopathy    D/t PNA and sepsis  tx underlying causes       Dementia without behavioral disturbance (CMS/HCC)    Psych following  Continue medication regimen      Cellulitis of right lower extremity- (present on admission)    Continue zosyn     Primary hypertension- (present on admission)    Continue home medications       Uncontrolled type 2 diabetes mellitus with hyperglycemia (CMS/HCC)- (present on admission)    Insulin basal bolus               VTE Prophylaxis - heparin.      Code Status -   Code Status and Medical Interventions:   Ordered at: 12/01/19 1925     Code Status:    CPR     Medical Interventions (Level of Support Prior to Arrest):    Full       Discharge Planning    Was from In behavioral health unit, now family wants rehab and  following    Electronically signed by Gera Sandhu DO, 12/03/19, 4:19 PM.  [facility] Hospitalist Team

## 2019-12-03 NOTE — THERAPY EVALUATION
Patient Name: Yuriy Bennett  : 1940    MRN: [id number]                              Today's Date: 12/3/2019       Admit Date: 2019    Visit Dx:     ICD-10-CM ICD-9-CM   1. Pneumonia of right lower lobe due to infectious organism (CMS/[facility]) J18.1 486   2. Delirium due to another medical condition F05 293.0   3. Alzheimer's dementia with behavioral disturbance, unspecified timing of dementia onset (CMS/[facility]) G30.9 331.0    F02.81 294.11   4. Sepsis without acute organ dysfunction, due to unspecified organism (CMS/HCC) A41.9 038.9     995.91   5. Mass of left chest wall R22.2 786.6     Patient Active Problem List   Diagnosis   • Cystitis   • Alzheimer's dementia with behavioral disturbance (CMS/HCC)   • Uncontrolled type 2 diabetes mellitus with hyperglycemia (CMS/[facility])   • H/O partial seizures   • History of prostate cancer   • Hyperlipidemia   • Noncompliance with medication regimen   • Nephrolithiasis   • Proteinuria   • HCAP (healthcare-associated pneumonia)   • Primary hypertension   • Sepsis due to pneumonia (CMS/HCC)   • Cellulitis of right lower extremity   • Dementia without behavioral disturbance (CMS/HCC)   • Toxic metabolic encephalopathy     Past Medical History:   Diagnosis Date   • Alzheimer disease (CMS/[facility])    • Cancer (CMS/[facility])     prostate   • Diabetes mellitus (CMS/[facility])    • Hypertension      History reviewed. No pertinent surgical history.  General Information     Row Name 19 1155          PT Evaluation Time/Intention    Document Type  evaluation  -CR     Mode of Treatment  physical therapy  -CR     Row Name 19 1159          General Information    Patient Profile Reviewed?  yes  -CR     Prior Level of Function  -- unsure of PLOF  -CR     Barriers to Rehab  cognitive status  -CR     Row Name 19 1155          Relationship/Environment    Lives With  -- from Behavior center; prior to that was in a rehab  -CR     Row Name 19 1155          Home Main Entrance    Number  of Stairs, Main Entrance  -- unknown  -CR     Row Name 12/03/19 1155          Cognitive Assessment/Intervention- PT/OT    Orientation Status (Cognition)  person  -CR     Row Name 12/03/19 1155          Safety Issues, Functional Mobility    Impairments Affecting Function (Mobility)  cognition;balance;endurance/activity tolerance  -CR       User Key  (r) = Recorded By, (t) = Taken By, (c) = Cosigned By    Initials Name Provider Type    CR Reyes, Carmela, PT Physical Therapist        Mobility     Row Name 12/03/19 1158          Bed Mobility Assessment/Treatment    Bed Mobility Assessment/Treatment  supine-sit;sit-supine  -CR     Supine-Sit [place] (Bed Mobility)  minimum assist (75% patient effort) pt went into long sitting   -CR     Sit-Supine [place] (Bed Mobility)  minimum assist (75% patient effort) needed assist with LE  -CR     Row Name 12/03/19 1158          Sit-Stand Transfer    Sit-Stand [place] (Transfers)  minimum assist (75% patient effort)  -CR     Row Name 12/03/19 1158          Gait/Stairs Assessment/Training    [place] Level (Gait)  not tested  -CR       User Key  (r) = Recorded By, (t) = Taken By, (c) = Cosigned By    Initials Name Provider Type    CR Reyes, Carmela, PT Physical Therapist        Obj/Interventions     Row Name 12/03/19 1159          General ROM    GENERAL ROM COMMENTS  active BLE WFL  -CR     Row Name 12/03/19 1159          MMT (Manual Muscle Testing)    General MMT Comments  grossly 3/5  -CR     Row Name 12/03/19 1159          Static Sitting Balance    Level of [place] (Unsupported Sitting, Static Balance)  independent  -CR     Sitting Position (Unsupported Sitting, Static Balance)  sitting on edge of bed  -CR     Time Able to Maintain Position (Unsupported Sitting, Static Balance)  more than 5 minutes  -CR     Row Name 12/03/19 1159          Dynamic Sitting Balance    Level of [place], Reaches Outside Midline (Sitting, Dynamic Balance)  supervision  -CR      Sitting Position, Reaches Outside Midline (Sitting, Dynamic Balance)  sitting on edge of bed  -CR     Row Name 12/03/19 1159          Static Standing Balance    Level of [place] (Supported Standing, Static Balance)  minimal assist, 75% patient effort  -CR     Time Able to Maintain Position (Supported Standing, Static Balance)  45 to 60 seconds  -CR     Row Name 12/03/19 1159          Dynamic Standing Balance    Level of [place], Reaches Outside Midline (Standing, Dynamic Balance)  minimal assist, 75% patient effort  -CR     Time Able to Maintain Position, Reaches Outside Midline (Standing, Dynamic Balance)  30 to 45 seconds  -CR     Row Name 12/03/19 1159          Sensory Assessment/Intervention    Sensory General Assessment  no sensation deficits identified  -CR       User Key  (r) = Recorded By, (t) = Taken By, (c) = Cosigned By    Initials Name Provider Type    CR Reyes, Carmela, PT Physical Therapist        Goals/Plan     Row Name 12/03/19 1206          Bed Mobility Goal 1 (PT)    Activity/Assistive Device (Bed Mobility Goal 1, PT)  bed mobility activities, all  -CR     [place] Level/Cues Needed (Bed Mobility Goal 1, PT)  conditional independence  -CR     Time Frame (Bed Mobility Goal 1, PT)  2 weeks  -CR     Row Name 12/03/19 1206          Transfer Goal 1 (PT)    Activity/Assistive Device (Transfer Goal 1, PT)  transfers, all  -CR     [place] Level/Cues Needed (Transfer Goal 1, PT)  supervision required  -CR     Time Frame (Transfer Goal 1, PT)  2 weeks  -CR     Row Name 12/03/19 1206          Gait Training Goal 1 (PT)    Activity/Assistive Device (Gait Training Goal 1, PT)  gait (walking locomotion);assistive device use;improve balance and speed;increase endurance/gait distance;decrease fall risk;walker, rolling  -CR     [place] Level (Gait Training Goal 1, PT)  minimum assist (75% or more patient effort)  -CR     Distance (Gait Goal 1, PT)  50  -CR     Time Frame (Gait Training  Goal 1, PT)  2 weeks  -CR       User Key  (r) = Recorded By, (t) = Taken By, (c) = Cosigned By    Initials Name Provider Type    CR Reyes, Carmela, MARKO Physical Therapist        Clinical Impression     Row Name 12/03/19 1203          Pain Assessment    Additional Documentation  Pain Scale: FACES Pre/Post-Treatment (Group)  -CR     Row Name 12/03/19 1203          Pain Scale: FACES Pre/Post-Treatment    Pain: FACES Scale, Pretreatment  0-->no hurt  -CR     Pain: FACES Scale, Post-Treatment  0-->no hurt  -CR     Row Name 12/03/19 1203          Plan of Care Review    Plan of Care Reviewed With  patient  -CR     Row Name 12/03/19 1203          Physical Therapy Clinical Impression    Criteria for Skilled Interventions Met (PT Clinical Impression)  yes;treatment indicated  -CR     Rehab Potential (PT Clinical Summary)  good, to achieve stated therapy goals  -CR     Predicted Duration of Therapy (PT)  d/c  -CR     Row Name 12/03/19 1203          Vital Signs    Intra SpO2 (%)  95  -CR     O2 Delivery Intra Treatment  room air  -CR     Row Name 12/03/19 1203          Positioning and Restraints    Pre-Treatment Position  in bed  -CR     Post Treatment Position  bed  -CR     In Bed  notified nsg;sitting;call light within reach;exit alarm on  -CR     Restraints  reapplied:  -CR       User Key  (r) = Recorded By, (t) = Taken By, (c) = Cosigned By    Initials Name Provider Type    CR Reyes, Carmela, MARKO Physical Therapist        Outcome Measures     Row Name 12/03/19 1209          How much help from another person do you currently need...    Turning from your back to your side while in flat bed without using bedrails?  4  -CR     Moving from lying on back to sitting on the side of a flat bed without bedrails?  3  -CR     Moving to and from a bed to a chair (including a wheelchair)?  3  -CR     Standing up from a chair using your arms (e.g., wheelchair, bedside chair)?  3  -CR     Climbing 3-5 steps with a railing?  2  -CR     To  walk in hospital room?  2  -CR     AM-PAC 6 Clicks Score (PT)  17  -CR     Row Name 12/03/19 1209          Functional Assessment    Outcome Measure Options  AM-PAC 6 Clicks Basic Mobility (PT)  -CR       User Key  (r) = Recorded By, (t) = Taken By, (c) = Cosigned By    Initials Name Provider Type    CR Reyes, Carmela, MARKO Physical Therapist        Physical Therapy Education     Title: PT OT SLP Therapies (In Progress)     Topic: Physical Therapy (In Progress)     Point: Mobility training (In Progress)     Learning Progress Summary           Patient Acceptance, E, NR by CR at 12/3/2019 12:10 PM                               User Key     Initials Effective Dates Name Provider Type Discipline    CR 03/01/19 -  Reyes, Carmela, PT Physical Therapist PT              PT Recommendation and Plan  Planned Therapy Interventions (PT Eval): balance training, bed mobility training, gait training, strengthening, transfer training, patient/family education  Outcome Summary/Treatment Plan (PT)  Anticipated Discharge Disposition (PT): inpatient rehabilitation facility  Plan of Care Reviewed With: patient  Outcome Summary: 78 y/o male admitted 12/1 due to delirium and found to have +HMPV. Pt is unable to report PLOF and medical record indicate pt was in a rehab facility and was transferred to IP behavior center prior to hospital admit. Pt required min A for supine <>sit tranfers. Pt able to sit EOB unsupported to eat breakfast. Pt required min A to come to standing and managed short shuffling steps fwd/back and side steps. Anticipate pt will do well using rw and will be followed while in hospital to progress as tolerated. Recommend rehab services at d/c .     Time Calculation:   PT Charges     Row Name 12/03/19 1230             Time Calculation    Start Time  0831  -CR      Stop Time  0900  -CR      Time Calculation (min)  29 min  -CR      PT Received On  12/03/19  -CR      PT - Next Appointment  12/05/19  -CR      PT Goal Re-Cert Due  Date  12/17/19  -CR         Time Calculation- PT    Total Timed Code Minutes- PT  8 minute(s)  -CR        User Key  (r) = Recorded By, (t) = Taken By, (c) = Cosigned By    Initials Name Provider Type    CR Reyes, Carmela, PT Physical Therapist        Therapy Charges for Today     Code Description Service Date Service Provider Modifiers Qty    59166326775 HC PT EVAL HIGH COMPLEXITY 4 12/3/2019 Reyes, Carmela, PT GP 1    72387429273 HC PT THERAPEUTIC ACT EA 15 MIN 12/3/2019 Reyes, Carmela, PT GP 1          PT G-Codes  Outcome Measure Options: AM-PAC 6 Clicks Basic Mobility (PT)  AM-PAC 6 Clicks Score (PT): 17    Carmela Reyes, PT  12/3/2019

## 2019-12-03 NOTE — PROGRESS NOTES
Continued Stay Note  AMAYA Pittman     Patient Name: Yuriy Bennett  MRN: [id number]  Today's Date: 12/3/2019    Admit Date: 12/1/2019    Discharge Plan     Row Name 12/03/19 1600       Plan    Plan  PT/OT recommending inpt rehab. From [facility]. Psych following. Spouse interested in [facility] ([facility]), once she tours. Will require PASRR and precert for rehab.     Patient/Family in Agreement with Plan  yes    Plan Comments  MSW spoke with patient's wife who said she was planning to tour [facility] this afternoon and make decision on rehab after she tours. DC Barriers: patient was placed in restraints last night.       Suzi Grubbs RN       Office Phone: [phone]  Office Cell: [phone]

## 2019-12-03 NOTE — PLAN OF CARE
Problem: Patient Care Overview  Goal: Plan of Care Review  Outcome: Ongoing (interventions implemented as appropriate)   12/03/19 8919   Coping/Psychosocial   Plan of Care Reviewed With patient   OTHER   Outcome Summary Pt is 79 year old male admitted for delerium and found to have HMPV, pna, and UTI. He is very confused at time of evauation and unable to provide PLOF. He was residing in inpatient psych facility prior to admit. He was able to mobilize with min-CGA. Followed one step commands for ADLs. He will reuqire IP rehab at discharge.

## 2019-12-03 NOTE — THERAPY EVALUATION
Acute Care - Occupational Therapy Initial Evaluation   [facility]     Patient Name: Yuriy Bennett  : 1940  MRN: [id number]  Today's Date: 12/3/2019             Admit Date: 2019       ICD-10-CM ICD-9-CM   1. Pneumonia of right lower lobe due to infectious organism (CMS/HCC) J18.1 486   2. Delirium due to another medical condition F05 293.0   3. Alzheimer's dementia with behavioral disturbance, unspecified timing of dementia onset (CMS/HCC) G30.9 331.0    F02.81 294.11   4. Sepsis without acute organ dysfunction, due to unspecified organism (CMS/HCC) A41.9 038.9     995.91   5. Mass of left chest wall R22.2 786.6     Patient Active Problem List   Diagnosis   • Cystitis   • Alzheimer's dementia with behavioral disturbance (CMS/HCC)   • Uncontrolled type 2 diabetes mellitus with hyperglycemia (CMS/HCC)   • H/O partial seizures   • History of prostate cancer   • Hyperlipidemia   • Noncompliance with medication regimen   • Nephrolithiasis   • Proteinuria   • HCAP (healthcare-associated pneumonia)   • Primary hypertension   • Sepsis due to pneumonia (CMS/HCC)   • Cellulitis of right lower extremity   • Dementia without behavioral disturbance (CMS/HCC)   • Toxic metabolic encephalopathy     Past Medical History:   Diagnosis Date   • Alzheimer disease (CMS/HCC)    • Cancer (CMS/HCC)     prostate   • Diabetes mellitus (CMS/HCC)    • Hypertension      History reviewed. No pertinent surgical history.       OT ASSESSMENT FLOWSHEET (last 12 hours)      Occupational Therapy Evaluation     Row Name 19 0951                   OT Evaluation Time/Intention    Subjective Information  no complaints  -SR        Document Type  evaluation  -SR        Mode of Treatment  occupational therapy  -SR        Comment  Unsure of PLOF.  Pt from inpatient [facility] hospital.    -SR           General Information    Pertinent History of Current Functional Problem  Pt admitted for RLE cellulitis, pna, and sepsis.  He has hx of Alzheimer's  Dementia with behavioral disturbance.  -SR        Existing Precautions/Restrictions  fall  -SR        Barriers to Rehab  cognitive status  -SR           Relationship/Environment    Lives With  facility resident  -SR           Resource/Environmental Concerns    Current Living Arrangements  residential facility  -SR           Cognitive Assessment/Intervention- PT/OT    Orientation Status (Cognition)  oriented to;person;disoriented to;place;situation;time Unable to provide birthday.   -SR           Safety Issues, Functional Mobility    Impairments Affecting Function (Mobility)  cognition;balance  -SR           Bed Mobility Assessment/Treatment    Bed Mobility Assessment/Treatment  supine-sit;sit-supine  -SR        Supine-Sit [place] (Bed Mobility)  minimum assist (75% patient effort)  -SR        Sit-Supine [place] (Bed Mobility)  minimum assist (75% patient effort)  -SR           Functional Mobility    Functional Mobility- Ind. Level  minimum assist (75% patient effort);2 person assist required  -SR        Functional Mobility- Comment  Provided with HHA pt able to walk around bed.   -SR           Transfer Assessment/Treatment    Transfer Assessment/Treatment  sit-stand transfer  -SR           Sit-Stand Transfer    Sit-Stand [place] (Transfers)  minimum assist (75% patient effort)  -SR           ADL Assessment/Intervention    BADL Assessment/Intervention  toileting;lower body dressing;grooming  -SR           Lower Body Dressing Assessment/Training    Lower Body Dressing [place] Level  minimum assist (75% patient effort);don;socks  -SR           Grooming Assessment/Training    [place] Level (Grooming)  supervision;set up;hair care, combing/brushing  -SR           Toileting Assessment/Training    [place] Level (Toileting)  dependent (less than 25% patient effort)  -SR        Comment (Toileting)  Pt incontinent with standing and urinated on socks and floor without noticing.  Urinal placed for  patient.    -SR           General ROM    GENERAL ROM COMMENTS  BUE WFL   -SR           MMT (Manual Muscle Testing)    General MMT Comments  BUE grossly 4-/5.  Difficulty following commands due to confusion.   -SR           Static Sitting Balance    Level of [place] (Unsupported Sitting, Static Balance)  supervision  -SR           Dynamic Sitting Balance    Level of [place], Reaches Outside Midline (Sitting, Dynamic Balance)  supervision  -SR           Static Standing Balance    Level of [place] (Supported Standing, Static Balance)  minimal assist, 75% patient effort  -SR           Dynamic Standing Balance    Level of [place], Reaches Outside Midline (Standing, Dynamic Balance)  minimal assist, 75% patient effort  -SR           Positioning and Restraints    Pre-Treatment Position  in bed  -SR        Post Treatment Position  bed  -SR        In Bed  fowlers;exit alarm on;encouraged to call for assist;call light within reach  -SR        Restraints  reapplied:  -SR           Planned OT Interventions    Planned Therapy Interventions (OT Eval)  activity tolerance training;BADL retraining;cognitive/visual perception retraining;functional balance retraining;occupation/activity based interventions;strengthening exercise;transfer/mobility retraining  -SR           OT Goals    Dressing Goal Selection (OT)  dressing, OT goal 1  -SR        Toileting Goal Selection (OT)  toileting, OT goal 1  -SR           Dressing Goal 1 (OT)    Activity/Assistive Device (Dressing Goal 1, OT)  dressing skills, all  -SR        [place]/Cues Needed (Dressing Goal 1, OT)  supervision required  -SR        Time Frame (Dressing Goal 1, OT)  2 weeks  -SR           Toileting Goal 1 (OT)    Activity/Device (Toileting Goal 1, OT)  toileting skills, all  -SR        [place] Level/Cues Needed (Toileting Goal 1, OT)  minimum assist (75% or more patient effort)  -SR        Time Frame (Toileting Goal 1, OT)  2 weeks  -SR           User Key  (r) = Recorded By, (t) = Taken By, (c) = Cosigned By    Initials Name Effective Dates    SR Lillian Pack OT 03/01/19 -          Occupational Therapy Education     Title: PT OT SLP Therapies (In Progress)     Topic: Occupational Therapy (In Progress)     Point: ADL training (Done)     Description: Instruct learner(s) on proper safety adaptation and remediation techniques during self care or transfers.   Instruct in proper use of assistive devices.    Learning Progress Summary           Patient Acceptance, E,TB, VU by SR at 12/3/2019  1:49 PM                               User Key     Initials Effective Dates Name Provider Type Discipline     03/01/19 -  Lillian Pack OT Occupational Therapist OT                  OT Recommendation and Plan  Planned Therapy Interventions (OT Eval): activity tolerance training, BADL retraining, cognitive/visual perception retraining, functional balance retraining, occupation/activity based interventions, strengthening exercise, transfer/mobility retraining  Plan of Care Review  Plan of Care Reviewed With: patient  Plan of Care Reviewed With: patient  Outcome Summary: Pt is 79 year old male admitted for delerium and found to have HMPV, pna, and UTI.  He is very confused at time of evauation and unable to provide PLOF.  He was residing in inpatient psych facility prior to admit.  He was able to mobilize with min-CGA.  Followed one step commands for ADLs.  He will reuqire IP rehab at discharge.         Time Calculation:   Time Calculation- OT     Row Name 12/03/19 1350             Time Calculation- OT    OT Start Time  0951  -SR      OT Stop Time  1014  -SR      OT Time Calculation (min)  23 min  -SR      Total Timed Code Minutes- OT  16 minute(s)  -SR      OT Non-Billable Time (min)  7 min  -SR      OT Received On  12/03/19  -SR      OT - Next Appointment  12/05/19  -SR      OT Goal Re-Cert Due Date  12/17/19  -SR        User Key  (r) = Recorded By, (t)  = Taken By, (c) = Cosigned By    Initials Name Provider Type    SR Lillian Pack, OT Occupational Therapist        Therapy Charges for Today     Code Description Service Date Service Provider Modifiers Qty    23517313989  OT EVAL MOD COMPLEXITY 4 12/3/2019 Lillian Pack, OT GO 1    09505183014  OT THERAPEUTIC ACT EA 15 MIN 12/3/2019 Lillian Pack, OT GO 1    83154527364 HC OT SELF CARE/MGMT/TRAIN EA 15 MIN 12/3/2019 Lillian Pack, OT GO 1               Lillian Pack OT  12/3/2019

## 2019-12-03 NOTE — PLAN OF CARE
Problem: Fall Risk (Adult)  Goal: Identify Related Risk Factors and Signs and Symptoms  Outcome: Ongoing (interventions implemented as appropriate)    Goal: Absence of Fall  Outcome: Ongoing (interventions implemented as appropriate)      Problem: Patient Care Overview  Goal: Plan of Care Review  Outcome: Ongoing (interventions implemented as appropriate)   12/03/19 0515   Coping/Psychosocial   Plan of Care Reviewed With patient   Plan of Care Review   Progress no change   OTHER   Outcome Summary patient became agititaed and combative requiring restraints, continue antbx       Problem: Skin Injury Risk (Adult)  Goal: Identify Related Risk Factors and Signs and Symptoms  Outcome: Ongoing (interventions implemented as appropriate)    Goal: Skin Health and Integrity  Outcome: Ongoing (interventions implemented as appropriate)      Problem: Restraint, Nonbehavioral (Nonviolent)  Goal: Nonbehavioral (Nonviolent) Restraint: Absence of Injury/Harm  Outcome: Ongoing (interventions implemented as appropriate)    Goal: Nonbehavioral (Nonviolent) Restraint: Achievement of Discontinuation Criteria  Outcome: Ongoing (interventions implemented as appropriate)    Goal: Nonbehavioral (Nonviolent) Restraint: Preservation of Dignity and Wellbeing  Outcome: Ongoing (interventions implemented as appropriate)

## 2019-12-03 NOTE — PLAN OF CARE
Problem: Patient Care Overview  Goal: Plan of Care Review  Outcome: Ongoing (interventions implemented as appropriate)   12/03/19 1510   Coping/Psychosocial   Plan of Care Reviewed With spouse   Plan of Care Review   Progress improving   OTHER   Outcome Summary Pt restraints removed at 1115. behavior has improved since arrival of his spouse Jayla Irene at bedside. will continue to monitor.     Goal: Individualization and Mutuality  Outcome: Ongoing (interventions implemented as appropriate)    Goal: Discharge Needs Assessment  Outcome: Ongoing (interventions implemented as appropriate)    Goal: Interprofessional Rounds/Family Conf  Outcome: Ongoing (interventions implemented as appropriate)      Problem: Skin Injury Risk (Adult)  Goal: Identify Related Risk Factors and Signs and Symptoms  Outcome: Ongoing (interventions implemented as appropriate)    Goal: Skin Health and Integrity  Outcome: Ongoing (interventions implemented as appropriate)      Problem: Restraint, Nonbehavioral (Nonviolent)  Goal: Rationale and Justification  Outcome: Ongoing (interventions implemented as appropriate)    Goal: Nonbehavioral (Nonviolent) Restraint: Absence of Injury/Harm  Outcome: Ongoing (interventions implemented as appropriate)    Goal: Nonbehavioral (Nonviolent) Restraint: Achievement of Discontinuation Criteria  Outcome: Ongoing (interventions implemented as appropriate)    Goal: Nonbehavioral (Nonviolent) Restraint: Preservation of Dignity and Wellbeing  Outcome: Ongoing (interventions implemented as appropriate)

## 2019-12-03 NOTE — PLAN OF CARE
Problem: Patient Care Overview  Goal: Plan of Care Review  Outcome: Ongoing (interventions implemented as appropriate)   12/03/19 1211   Coping/Psychosocial   Plan of Care Reviewed With patient   OTHER   Outcome Summary 80 y/o male admitted 12/1 due to delirium and found to have +HMPV. Pt is unable to report PLOF and medical record indicate pt was in a rehab facility and was transferred to IP behavior center prior to hospital admit. Pt required min A for supine <>sit tranfers. Pt able to sit EOB unsupported to eat breakfast. Pt required min A to come to standing and managed short shuffling steps fwd/back and side steps. Anticipate pt will do well using rw and will be followed while in hospital to progress as tolerated. Recommend rehab services at d/c .

## 2019-12-04 NOTE — PLAN OF CARE
Problem: Fall Risk (Adult)  Goal: Identify Related Risk Factors and Signs and Symptoms  Outcome: Ongoing (interventions implemented as appropriate)    Goal: Absence of Fall  Outcome: Ongoing (interventions implemented as appropriate)      Problem: Patient Care Overview  Goal: Plan of Care Review  Outcome: Ongoing (interventions implemented as appropriate)   12/04/19 1549   Coping/Psychosocial   Plan of Care Reviewed With patient   Plan of Care Review   Progress improving   OTHER   Outcome Summary Pt having episodes of agression. Wife now at bedside. cont iv abx. pending wife's decision on facility.     Goal: Individualization and Mutuality  Outcome: Ongoing (interventions implemented as appropriate)    Goal: Discharge Needs Assessment  Outcome: Ongoing (interventions implemented as appropriate)    Goal: Interprofessional Rounds/Family Conf  Outcome: Ongoing (interventions implemented as appropriate)      Problem: Skin Injury Risk (Adult)  Goal: Identify Related Risk Factors and Signs and Symptoms  Outcome: Ongoing (interventions implemented as appropriate)    Goal: Skin Health and Integrity  Outcome: Ongoing (interventions implemented as appropriate)

## 2019-12-04 NOTE — PROGRESS NOTES
"      [facility] Medicine Services Daily Progress Note      Hospitalist Team  LOS 3 days      Patient Care Team:  Carolina Tran FNP as PCP - General (Family Medicine)    Patient Location: 2124/1      Subjective   Subjective     Chief Complaint / Subjective  Chief Complaint   Patient presents with   • Altered Mental Status       HPI limited with confusion and dementia. No family in room. Seems more calm today but still confused.    Brief Synopsis of Hospital Course/HPI  79-year-old male sent in from a mini home after he was noted to have altered mental status after lunch.  The patient apparently had been evaluated 10 days ago for worsening confusion and delirium but had a negative work-up at that time.  His blood sugar was reportedly normal.  There is no documented elevation of fever or chills although the patient's significant other noticed that he felt warm.  The patient apparently has recently been started on valproic acid for dementia features and no recent levels were reported.  Patient also apparently receives lorazepam for generalized anxiety     History from the wife.  She was generally unsatisfied with care at the [facility] inpatient facility.  Unknown why he was admitted over there.  Prior to that he was in nursing home and before that he was at [facility].  She states that he has not been home in about 2 months.  Mostly bedbound.  Needs plenty of help.  Known history of prostate cancer in the past with seed implantation.          Review of Systems   Unable to perform ROS: dementia         Objective   Objective      Vital Signs  Temp:  [98.1 °F (36.7 °C)-98.8 °F (37.1 °C)] 98.4 °F (36.9 °C)  Heart Rate:  [] 86  Resp:  [14-22] 18  BP: (117-162)/(69-89) 129/74  Oxygen Therapy  SpO2: 92 %  Pulse Oximetry Type: Continuous  Device (Oxygen Therapy): nasal cannula  Flow (L/min): 3  Flowsheet Rows      First Filed Value   Admission Height  182.9 cm (72\") Documented at 12/01/2019 1411 "   Admission Weight  79.4 kg (175 lb) Documented at 12/01/2019 1411        Intake & Output (last 3 days)       12/01 0701 - 12/02 0700 12/02 0701 - 12/03 0700 12/03 0701 - 12/04 0700 12/04 0701 - 12/05 0700    P.O.  360 480 600    Total Intake(mL/kg)  360 (5.7) 480 (7.5) 600 (9.4)    Urine (mL/kg/hr) 925  200 (0.1)     Stool   0     Total Output 925  200     Net -925 +360 +280 +600            Urine Unmeasured Occurrence  1 x 1 x 3 x    Stool Unmeasured Occurrence   1 x 1 x        Lines, Drains & Airways    Active LDAs     None                  Physical Exam:    Physical Exam   Constitutional: No distress.   HENT:   Head: Normocephalic.   Eyes: Pupils are equal, round, and reactive to light.   Pulmonary/Chest: Effort normal.   Diminished BS at the bases   Abdominal: Soft.   Neurological: He is alert.   Oriented x1   Psychiatric:   Calm cooperative          Results Review:     I reviewed the patient's new clinical results.      Lab Results (last 24 hours)     Procedure Component Value Units Date/Time    POC Glucose Once [[id number]]  (Abnormal) Collected:  12/04/19 1640    Specimen:  Blood Updated:  12/04/19 1648     Glucose 223 mg/dL      Comment: Serial Number: [id number]:  [id number]       Blood Culture - Blood, Arm, Right [[id number]] Collected:  12/01/19 1449    Specimen:  Blood from Arm, Right Updated:  12/04/19 1500     Blood Culture No growth at 3 days    Blood Culture - Blood, Arm, Left [[id number]] Collected:  12/01/19 1436    Specimen:  Blood from Arm, Left Updated:  12/04/19 1445     Blood Culture No growth at 3 days    POC Glucose Once [[id number]]  (Abnormal) Collected:  12/04/19 1155    Specimen:  Blood Updated:  12/04/19 1214     Glucose 238 mg/dL      Comment: Serial Number: [id number]:  [id number]       POC Glucose Once [[id number]]  (Abnormal) Collected:  12/04/19 0838    Specimen:  Blood Updated:  12/04/19 0839     Glucose 106 mg/dL      Comment: Serial Number: [id number]:  [id number]        POC Glucose Once [[id number]]  (Abnormal) Collected:  12/04/19 0814    Specimen:  Blood Updated:  12/04/19 0816     Glucose 62 mg/dL      Comment: Serial Number: [id number]:  [id number]       POC Glucose Once [[id number]]  (Abnormal) Collected:  12/04/19 0745    Specimen:  Blood Updated:  12/04/19 0747     Glucose 58 mg/dL      Comment: Serial Number: [id number]:  [id number]       POC Glucose Once [[id number]]  (Abnormal) Collected:  12/04/19 0724    Specimen:  Blood Updated:  12/04/19 0730     Glucose 41 mg/dL      Comment: Serial Number: [id number]:  [id number]       POC Glucose Once [[id number]]  (Abnormal) Collected:  12/04/19 0426    Specimen:  Blood Updated:  12/04/19 0426     Glucose 134 mg/dL      Comment: Serial Number: [id number]:  [id number]       POC Glucose Once [[id number]]  (Abnormal) Collected:  12/04/19 0408    Specimen:  Blood Updated:  12/04/19 0424     Glucose 44 mg/dL      Comment: Serial Number: [id number]:  [id number]       Basic Metabolic Panel [[id number]]  (Abnormal) Collected:  12/04/19 0315    Specimen:  Blood Updated:  12/04/19 0404     Glucose 47 mg/dL      BUN 15 mg/dL      Creatinine 0.74 mg/dL      Sodium 141 mmol/L      Potassium 3.5 mmol/L      Chloride 102 mmol/L      CO2 28.0 mmol/L      Calcium 8.1 mg/dL      eGFR Non African Amer 102 mL/min/1.73      BUN/Creatinine Ratio 20.3     Anion Gap 11.0 mmol/L     Narrative:       GFR Normal >60  Chronic Kidney Disease <60  Kidney Failure <15    CBC & Differential [[id number]] Collected:  12/04/19 0315    Specimen:  Blood Updated:  12/04/19 0332    Narrative:       The following orders were created for panel order CBC & Differential.  Procedure                               Abnormality         Status                     ---------                               -----------         ------                     CBC Auto Differential[[id number]]        Abnormal            Final result                 Please view results  for these tests on the individual orders.    CBC Auto Differential [[id number]]  (Abnormal) Collected:  12/04/19 0315    Specimen:  Blood Updated:  12/04/19 0332     WBC 7.30 10*3/mm3      RBC 3.38 10*6/mm3      Hemoglobin 10.5 g/dL      Hematocrit 31.4 %      MCV 92.9 fL      MCH 30.9 pg      MCHC 33.3 g/dL      RDW 15.7 %      RDW-SD 51.2 fl      MPV 6.3 fL      Platelets 297 10*3/mm3      Neutrophil % 64.8 %      Lymphocyte % 20.4 %      Monocyte % 13.6 %      Eosinophil % 0.7 %      Basophil % 0.5 %      Neutrophils, Absolute 4.70 10*3/mm3      Lymphocytes, Absolute 1.50 10*3/mm3      Monocytes, Absolute 1.00 10*3/mm3      Eosinophils, Absolute 0.10 10*3/mm3      Basophils, Absolute 0.00 10*3/mm3      nRBC 0.1 /100 WBC     POC Glucose Once [[id number]]  (Abnormal) Collected:  12/03/19 1941    Specimen:  Blood Updated:  12/03/19 1941     Glucose 133 mg/dL      Comment: Serial Number: [id number]:  [id number]           No results found for: HGBA1C                Microbiology Results (last 10 days)     Procedure Component Value - Date/Time    Respiratory Panel, PCR - Swab, Nasopharynx [[id number]]  (Abnormal) Collected:  12/01/19 2029    Lab Status:  Final result Specimen:  Swab from Nasopharynx Updated:  12/01/19 2154     ADENOVIRUS, PCR Not Detected     Coronavirus 229E Not Detected     Coronavirus HKU1 Not Detected     Coronavirus NL63 Not Detected     Coronavirus OC43 Not Detected     Human Metapneumovirus Detected     Human Rhinovirus/Enterovirus Not Detected     Influenza B PCR Not Detected     Parainfluenza Virus 1 Not Detected     Parainfluenza Virus 2 Not Detected     Parainfluenza Virus 3 Not Detected     Parainfluenza Virus 4 Not Detected     Bordetella pertussis pcr Not Detected     Influenza A H1 2009 PCR Not Detected     Chlamydophila pneumoniae PCR Not Detected     Mycoplasma pneumo by PCR Not Detected     Influenza A PCR Not Detected     Influenza A H3 Not Detected     Influenza A H1 Not Detected      RSV, PCR Not Detected    Urine Culture - Urine, Urine, Catheter [[id number]]  (Abnormal) Collected:  12/01/19 1702    Lab Status:  Final result Specimen:  Urine, Catheter Updated:  12/02/19 1328     Urine Culture Yeast isolated     Comment: No further work up.       Blood Culture - Blood, Arm, Right [[id number]] Collected:  12/01/19 1449    Lab Status:  Preliminary result Specimen:  Blood from Arm, Right Updated:  12/04/19 1500     Blood Culture No growth at 3 days    Blood Culture - Blood, Arm, Left [[id number]] Collected:  12/01/19 1436    Lab Status:  Preliminary result Specimen:  Blood from Arm, Left Updated:  12/04/19 1445     Blood Culture No growth at 3 days          ECG/EMG Results (most recent)     None                    Xr Chest 1 View    Result Date: 12/2/2019   1. Moderate interval worsening from December 1 2. Bilateral perihilar pneumonia is mild to moderate in degree along with change suggesting low-grade failure  Electronically Signed By-Yunier Coronel Jr. On:12/2/2019 7:24 AM This report was finalized on [id number] by  Yunier Coronel Jr., .    Xr Chest 1 View    Result Date: 12/1/2019  1. 2 cm nodular opacity in the left upper chest is most likely due to calcified cartilage at the left first rib and. Recommend PA and lateral when possible to confirm. 2. Mild left basal infiltrate versus atelectasis.  Electronically Signed By-Joelle Luna On:12/1/2019 2:56 PM This report was finalized on [id number] by  Joelle Luna, .      Xrays, labs reviewed personally by physician.    Medication Review:   I have reviewed the patient's current medication list      Scheduled Meds    divalproex 250 mg Oral TID   famotidine 40 mg Oral BID   folic acid 1 mg Oral Daily   heparin (porcine) 5,000 Units Subcutaneous Q12H   [START ON 12/5/2019] insulin glargine 10 Units Subcutaneous QAM   insulin lispro 0-9 Units Subcutaneous 4x Daily With Meals & Nightly   insulin lispro 5 Units Subcutaneous TID AC   LORazepam 1 mg Oral  Nightly   metoprolol tartrate 12.5 mg Oral BID   piperacillin-tazobactam 3.375 g Intravenous Q8H   sodium chloride 10 mL Intravenous Q12H   thiamine 100 mg Oral Daily       Meds Infusions       Meds PRN  •  acetaminophen **OR** acetaminophen **OR** acetaminophen  •  acetaminophen  •  bisacodyl  •  dextrose  •  dextrose  •  glucagon (human recombinant)  •  haloperidol lactate  •  insulin lispro **AND** insulin lispro  •  lactated ringers  •  polyethylene glycol  •  prochlorperazine  •  QUEtiapine  •  sodium chloride  •  sodium chloride    I personally reviewed patient's EKG    Assessment/Plan   Assessment/Plan     Active Hospital Problems:  * Sepsis due to pneumonia (CMS/HCC)- (present on admission)    Resolving  Cx negative thus far      Toxic metabolic encephalopathy    D/t PNA and sepsis  tx underlying causes       HCAP (healthcare-associated pneumonia)- (present on admission)    On zosyn, consider switch to po soon   Supportive treatment  Cx negative thus far      Uncontrolled type 2 diabetes mellitus with hyperglycemia (CMS/HCC)- (present on admission)    BG low this am  Decrease basal lantus to 10 units daily and start tomorrow  SSI and follow BG      Dementia without behavioral disturbance (CMS/HCC)    Psych following  Continue medication regimen      Cellulitis of right lower extremity- (present on admission)    Improving abx as above     Primary hypertension- (present on admission)    Continue home medications                 VTE Prophylaxis - heparin.      Code Status -   Code Status and Medical Interventions:   Ordered at: 12/01/19 1925     Code Status:    CPR     Medical Interventions (Level of Support Prior to Arrest):    Full       Discharge Planning    Was from In behavioral health unit, now family wants rehab and  following    Electronically signed by Gera Sandhu DO, 12/04/19, 6:20 PM.  [facility] Hospitalist Team

## 2019-12-04 NOTE — DISCHARGE PLACEMENT REQUEST
"Tevin Guevara (79 y.o. Male)     Date of Birth Social Security Number Address Home Phone MRN    1940  [address]   [facility] [id number] [phone] [id number]    Taoism Marital Status          Rastafari        Admission Date Admission Type Admitting Provider Attending Provider Department, Room/Bed    12/1/19 Emergency Gera Sandhu, Gera Arteaga,  [facility] CARE, 2124/1    Discharge Date Discharge Disposition Discharge Destination                       Attending Provider:  Gera Sandhu,     Allergies:  No Known Allergies    Isolation:  Contact Drop   Infection:  Human Metapneumovirus  (12/01/19)   Code Status:  CPR    Ht:  182.9 cm (72\")   Wt:  63.6 kg (140 lb 3.4 oz)    Admission Cmt:  None   Principal Problem:  Sepsis due to pneumonia (CMS/[facility]) [J18.9,A41.9] More...                 Active Insurance as of 12/1/2019     Primary Coverage     Payor Plan Insurance Group Employer/Plan Group    HUMANA MEDICARE REPLACEMENT HUMANA MEDICARE REPLACEMENT [id number]     Payor Plan Address Payor Plan Phone Number Payor Plan Fax Number Effective Dates    [address] [address] [phone]  1/1/2013 - None Entered    [facility] [id number]       Subscriber Name Subscriber Birth Date Member ID       TEVIN GUEVARA 1940 [id number]                 Emergency Contacts      (Rel.) Home Phone Work Phone Mobile Phone    Dorys Guevara (Spouse) [phone] -- [phone]               History & Physical      Luiz Hansen MD at 12/01/19 1930          [facility] HOSPITALIST     Carolina Tran FNP    Patient Care Team:  Carolina Tran FNP as PCP - General (Family Medicine)    CHIEF COMPLAINT:     Chief Complaint   Patient presents with   • Altered Mental Status       HISTORY OF PRESENT ILLNESS:    79-year-old male sent in from a mini home after he was noted to have altered mental status after lunch.  The patient apparently had been evaluated 10 days ago " for worsening confusion and delirium but had a negative work-up at that time.  His blood sugar was reportedly normal.  There is no documented elevation of fever or chills although the patient's significant other noticed that he felt warm.  The patient apparently has recently been started on valproic acid for dementia features and no recent levels were reported.  Patient also apparently receives lorazepam for generalized anxiety     History from the wife.  She was generally unsatisfied with care at the [facility] inpatient facility.  Unknown why he was admitted over there.  Prior to that he was in nursing home and before that he was at [facility].  She states that he has not been home in about 2 months.  Mostly bedbound.  Needs plenty of help.  Known history of prostate cancer in the past with seed implantation.    Past Medical History:   Diagnosis Date   • Alzheimer disease (CMS/HCC)    • Cancer (CMS/HCC)     prostate   • Diabetes mellitus (CMS/HCC)    • Hypertension      History reviewed. No pertinent surgical history.  History reviewed. No pertinent family history.  Social History     Tobacco Use   • Smoking status: Never Smoker   • Smokeless tobacco: Never Used   Substance Use Topics   • Alcohol use: No   • Drug use: Defer     Medications Prior to Admission   Medication Sig Dispense Refill Last Dose   • divalproex (DEPAKOTE) 125 MG DR tablet Take 250 mg by mouth 3 (Three) Times a Day.   12/1/2019 at Unknown time   • famotidine (PEPCID) 20 MG tablet Take 40 mg by mouth 2 (Two) Times a Day.   12/1/2019 at Unknown time   • folic acid (FOLVITE) 1 MG tablet Take 1 mg by mouth Daily.   12/1/2019 at Unknown time   • haloperidol lactate (HALDOL) 5 MG/ML injection Inject 5 mg into the appropriate muscle as directed by prescriber Every 6 (Six) Hours As Needed for Agitation.   Past Week at Unknown time   • insulin glargine (LANTUS) 100 UNIT/ML injection Inject 15 Units under the skin into the appropriate area as directed  Daily.      • insulin lispro (humaLOG) 100 UNIT/ML injection Inject  under the skin into the appropriate area as directed 4 (Four) Times a Day After Meals & at Bedtime. Sliding scale   12/1/2019 at Unknown time   • insulin lispro (humaLOG) 100 UNIT/ML injection Inject 5 Units under the skin into the appropriate area as directed 3 (Three) Times a Day Before Meals.   12/1/2019 at Unknown time   • LORazepam (ATIVAN) 1 MG tablet Take 1 mg by mouth every night at bedtime.   11/30/2019 at Unknown time   • LORazepam (ATIVAN) 2 MG/ML injection Inject 1 mg into the appropriate muscle as directed by prescriber Every 6 (Six) Hours As Needed for Anxiety.   Past Week at Unknown time   • metoprolol tartrate (LOPRESSOR) 25 MG tablet Take 12.5 mg by mouth 2 (Two) Times a Day.   12/1/2019 at Unknown time   • QUEtiapine (SEROquel) 25 MG tablet Take 25 mg by mouth Every 4 (Four) Hours As Needed (for agitation).   Past Week at Unknown time   • temazepam (RESTORIL) 15 MG capsule Take 15 mg by mouth every night at bedtime.   11/30/2019 at Unknown time   • thiamine (VITAMIN B-1) 100 MG tablet Take 100 mg by mouth 3 (Three) Times a Day.   12/1/2019 at Unknown time   • ziprasidone (GEODON) 20 MG injection Inject 10 mg into the appropriate muscle as directed by prescriber Every 12 (Twelve) Hours As Needed for Agitation.   Past Week at Unknown time   • acetaminophen (TYLENOL) 325 MG tablet Take 650 mg by mouth Every 6 (Six) Hours As Needed for Mild Pain , Moderate Pain  or Fever.   Unknown at Unknown time   • loperamide (IMODIUM) 2 MG capsule Take 2 mg by mouth 4 (Four) Times a Day As Needed for Diarrhea.   Unknown at Unknown time   • magnesium hydroxide (MILK OF MAGNESIA) 400 MG/5ML suspension Take 30 mL by mouth Daily As Needed for Constipation or Heartburn.   Unknown at Unknown time   • polyethylene glycol (MIRALAX) packet Take 17 g by mouth Daily As Needed (for constipation).   Unknown at Unknown time     Allergies:  Patient has no known  "allergies.    There is no immunization history for the selected administration types on file for this patient.        REVIEW OF SYSTEMS:     Review of Systems   Constitution: Positive for fever.   HENT: Negative.    Eyes: Negative.    Cardiovascular: Negative.    Respiratory: Negative.    Endocrine: Negative.    Hematologic/Lymphatic: Negative.    Skin: Negative.    Musculoskeletal: Negative.    Gastrointestinal: Negative.    Genitourinary: Negative.    Neurological: Negative.    Psychiatric/Behavioral: Positive for altered mental status.   Allergic/Immunologic: Negative.    All other systems reviewed and are negative.      Vital Signs  Temp:  [98.8 °F (37.1 °C)-102.6 °F (39.2 °C)] 98.8 °F (37.1 °C)  Heart Rate:  [] 97  Resp:  [18] 18  BP: (120-157)/(53-73) 157/66    Flowsheet Rows      First Filed Value   Admission Height  182.9 cm (72\") Documented at 12/01/2019 1411   Admission Weight  79.4 kg (175 lb) Documented at 12/01/2019 1411           Physical Exam:    Physical Exam   Constitutional: He is oriented to person, place, and time. He appears well-developed and well-nourished. No distress.   HENT:   Head: Normocephalic and atraumatic.   Right Ear: External ear normal.   Left Ear: External ear normal.   Nose: Nose normal.   Mouth/Throat: Oropharynx is clear and moist. No oropharyngeal exudate.   Eyes: Conjunctivae and EOM are normal. Pupils are equal, round, and reactive to light. Right eye exhibits no discharge. Left eye exhibits no discharge. No scleral icterus.   Neck: Normal range of motion. No JVD present. No tracheal deviation present. No thyromegaly present.   Cardiovascular: Normal rate, regular rhythm and normal heart sounds. Exam reveals no gallop and no friction rub.   No murmur heard.  +2 leg edema below knees bilateral   Pulmonary/Chest: Effort normal. No stridor. No respiratory distress. He has no wheezes. He has rales. He exhibits no tenderness.   Left lower lobe Rales and good air entry. "   Abdominal: Soft. Bowel sounds are normal. He exhibits no distension and no mass. There is no tenderness. There is no rebound and no guarding. No hernia.   Musculoskeletal: Normal range of motion. He exhibits no edema, tenderness or deformity.   Lymphadenopathy:     He has no cervical adenopathy.   Neurological: He is alert and oriented to person, place, and time. No cranial nerve deficit or sensory deficit. He exhibits normal muscle tone. Coordination normal.   Answers basic questions.  Oriented to self.  Knows wife but does not know her name.-The extent of is oriented his orientation.   Skin: Skin is warm and dry. No rash noted. He is not diaphoretic. There is erythema.   Skin of bilateral lower extremities below the knee and is red and erythematous  Onychomycosis present multiple toes bilaterally   Psychiatric: He has a normal mood and affect. His behavior is normal.   Nursing note and vitals reviewed.          Emotional Behavior:   Able to assess   Debilities:  Mostly bedbound    Results Review:    I reviewed the patient's new clinical results.      Results from last 7 days   Lab Units 12/01/19  1436   WBC 10*3/mm3 9.50   HEMOGLOBIN g/dL 11.1*   HEMATOCRIT % 33.1*   PLATELETS 10*3/mm3 359     Results from last 7 days   Lab Units 12/01/19  1436   SODIUM mmol/L 134*   POTASSIUM mmol/L 4.8   CHLORIDE mmol/L 97*   CO2 mmol/L 27.0   BUN mg/dL 18   CREATININE mg/dL 0.96   GLUCOSE mg/dL 272*   CALCIUM mg/dL 8.4*     Results from last 7 days   Lab Units 12/01/19  1436   SODIUM mmol/L 134*   POTASSIUM mmol/L 4.8   CHLORIDE mmol/L 97*   CO2 mmol/L 27.0   BUN mg/dL 18   CREATININE mg/dL 0.96   CALCIUM mg/dL 8.4*   BILIRUBIN mg/dL 0.2   ALK PHOS U/L 124*   ALT (SGPT) U/L 13   AST (SGOT) U/L 21   GLUCOSE mg/dL 272*         Lab Results   Component Value Date    CALCIUM 8.4 (L) 12/01/2019     No results found for: HGBA1C  No results found for: CHOL, CHLPL, TRIG, HDL, LDL, LDLDIRECT  No results found for: LIPASE        No  results found for: INTRAOP, PREDX, FINALDX, COMDX    Microbiology Results (last 10 days)     ** No results found for the last 240 hours. **          ECG/EMG Results (most recent)     None                    Xr Chest 1 View    Result Date: 12/1/2019  1. 2 cm nodular opacity in the left upper chest is most likely due to calcified cartilage at the left first rib and. Recommend PA and lateral when possible to confirm. 2. Mild left basal infiltrate versus atelectasis.  Electronically Signed By-Joelle Luna On:12/1/2019 2:56 PM This report was finalized on [id number] by  Joelle Luna, .        Assessment/Plan     * Sepsis due to pneumonia (CMS/HCC)- (present on admission)    Hydration  Empiric antibiotics  Follow lactate  Follow cultures  Supportive treatment     HCAP (healthcare-associated pneumonia)- (present on admission)    Empiric antibiotics  Supportive treatment  Follow cultures     Cellulitis of right lower extremity- (present on admission)    Continue cefepime being used for pneumonia.  Otherwise has been on Keflex outpatient.     Primary hypertension- (present on admission)    Continue home medications  PRN medications have not been shown to affect outcomes       Hyperlipidemia- (present on admission)    I would be in favor of deep prescribing statins on account of multiple comorbidities and competing causes of mortality     Uncontrolled type 2 diabetes mellitus with hyperglycemia (CMS/HCC)- (present on admission)    Hydration  Insulin basal bolus       Resolved Hospital Problems:  No notes have been filed under this hospital service.  Service: Hospitalist      Estimated Creatinine Clearance: 68.9 mL/min (by C-G formula based on SCr of 0.96 mg/dL).    Code Status and Medical Interventions:   Ordered at: 12/01/19 1925     Code Status:    CPR     Medical Interventions (Level of Support Prior to Arrest):    Full       I personally reviewed patient's x-ray films and my findings are: 0    I personally reviewed  "patient's EKG strip and my findings are: 0    Plan    Care coordination with nursing for current management.  Discussed with wife at length.  Will need nursing home placement on discharge 12/01/19 7:30 PM    Disposition        Destination      No service coordination in this encounter.      Durable Medical Equipment      No service coordination in this encounter.      Dialysis/Infusion      No service coordination in this encounter.      Home Medical Care      No service coordination in this encounter.      Therapy      No service coordination in this encounter.      Community Resources      No service coordination in this encounter.            Luiz Hansen MD  12/01/19  7:30 PM            Electronically signed by Luiz Hansen MD at 12/01/19 1937       [facility] Medications (active)       Dose Frequency Start End    acetaminophen (TYLENOL) 160 MG/5ML solution 325 mg 325 mg Every 4 Hours PRN 12/1/2019     Sig - Route: Take 10.2 mL by mouth Every 4 (Four) Hours As Needed for Mild Pain . - Oral    Linked Group 1:  \"Or\" Linked Group Details        acetaminophen (TYLENOL) suppository 325 mg 325 mg Every 4 Hours PRN 12/1/2019     Sig - Route: Insert 0.5 suppositories into the rectum Every 4 (Four) Hours As Needed for Mild Pain . - Rectal    Linked Group 1:  \"Or\" Linked Group Details        acetaminophen (TYLENOL) tablet 325 mg 325 mg Every 4 Hours PRN 12/1/2019     Sig - Route: Take 1 tablet by mouth Every 4 (Four) Hours As Needed for Mild Pain . - Oral    Linked Group 1:  \"Or\" Linked Group Details        acetaminophen (TYLENOL) tablet 650 mg 650 mg Every 6 Hours PRN 12/1/2019     Sig - Route: Take 2 tablets by mouth Every 6 (Six) Hours As Needed for Mild Pain , Moderate Pain  or Fever. - Oral    bisacodyl (DULCOLAX) suppository 10 mg 10 mg Daily PRN 12/1/2019     Sig - Route: Insert 1 suppository into the rectum Daily As Needed for Constipation. - Rectal    dextrose (D50W) 25 g/ 50mL Intravenous Solution 25 g 25 g Every 15 " Minutes PRN 12/1/2019     Sig - Route: Infuse 50 mL into a venous catheter Every 15 (Fifteen) Minutes As Needed for Low Blood Sugar (Blood Sugar Less Than 70). - Intravenous    dextrose (GLUTOSE) oral gel 15 g 15 g Every 15 Minutes PRN 12/1/2019     Sig - Route: Take 15 application by mouth Every 15 (Fifteen) Minutes As Needed for Low Blood Sugar (Blood sugar less than 70). - Oral    divalproex (DEPAKOTE) DR tablet 250 mg 250 mg 3 Times Daily 12/1/2019     Sig - Route: Take 2 tablets by mouth 3 (Three) Times a Day. - Oral    famotidine (PEPCID) tablet 40 mg 40 mg 2 Times Daily 12/1/2019     Sig - Route: Take 2 tablets by mouth 2 (Two) Times a Day. - Oral    folic acid (FOLVITE) tablet 1 mg 1 mg Daily 12/2/2019     Sig - Route: Take 1 tablet by mouth Daily. - Oral    glucagon (human recombinant) (GLUCAGEN DIAGNOSTIC) injection 1 mg 1 mg Every 15 Minutes PRN 12/1/2019     Sig - Route: Inject 1 mg under the skin into the appropriate area as directed Every 15 (Fifteen) Minutes As Needed for Low Blood Sugar (Blood Glucose Less Than 70). - Subcutaneous    haloperidol lactate (HALDOL) injection 2 mg 2 mg Every 6 Hours PRN 12/1/2019     Sig - Route: Infuse 0.4 mL into a venous catheter Every 6 (Six) Hours As Needed for Agitation. - Intravenous    heparin (porcine) 5000 UNIT/ML injection 5,000 Units 5,000 Units Every 12 Hours Scheduled 12/1/2019     Sig - Route: Inject 1 mL under the skin into the appropriate area as directed Every 12 (Twelve) Hours. - Subcutaneous    insulin glargine (LANTUS) injection 10 Units 10 Units Every Morning 12/5/2019     Sig - Route: Inject 10 Units under the skin into the appropriate area as directed Every Morning. - Subcutaneous    insulin lispro (humaLOG) injection 0-9 Units 0-9 Units 4 Times Daily With Meals & Nightly 12/1/2019     Sig - Route: Inject 0-9 Units under the skin into the appropriate area as directed 4 (Four) Times a Day With Meals & at Bedtime. - Subcutaneous    Linked Group 2:   "\"And\" Linked Group Details        insulin lispro (humaLOG) injection 0-9 Units 0-9 Units As Needed 12/1/2019     Sig - Route: Inject 0-9 Units under the skin into the appropriate area as directed As Needed for High Blood Sugar (Per the administration instructions). - Subcutaneous    Linked Group 2:  \"And\" Linked Group Details        insulin lispro (humaLOG) injection 5 Units 5 Units 3 Times Daily Before Meals 12/2/2019     Sig - Route: Inject 5 Units under the skin into the appropriate area as directed 3 (Three) Times a Day Before Meals. - Subcutaneous    lactated ringers bolus 2,382 mL 30 mL/kg × 79.4 kg As Needed 12/1/2019     Sig - Route: Infuse 2,382 mL into a venous catheter As Needed (MAP Less Than 65 or SPB <90 or Lactic Acid 4 or Higher). - Intravenous    LORazepam (ATIVAN) tablet 1 mg 1 mg Nightly 12/1/2019     Sig - Route: Take 1 tablet by mouth Every Night. - Oral    metoprolol tartrate (LOPRESSOR) tablet 12.5 mg 12.5 mg 2 Times Daily 12/1/2019     Sig - Route: Take 0.5 tablets by mouth 2 (Two) Times a Day. - Oral    piperacillin-tazobactam (ZOSYN) IVPB 3.375 g in 100 mL NS (CD) 3.375 g Every 8 Hours 12/3/2019 12/9/2019    Sig - Route: Infuse 3.375 g into a venous catheter Every 8 (Eight) Hours. - Intravenous    polyethylene glycol (MIRALAX) powder 17 g 17 g Daily PRN 12/1/2019     Sig - Route: Take 17 g by mouth Daily As Needed (for constipation). - Oral    prochlorperazine (COMPAZINE) injection 5 mg 5 mg Every 4 Hours PRN 12/1/2019     Sig - Route: Infuse 1 mL into a venous catheter Every 4 (Four) Hours As Needed for Nausea or Vomiting. - Intravenous    QUEtiapine (SEROquel) tablet 25 mg 25 mg Every 4 Hours PRN 12/1/2019     Sig - Route: Take 1 tablet by mouth Every 4 (Four) Hours As Needed (for agitation). - Oral    sodium chloride 0.9 % flush 10 mL 10 mL As Needed 12/1/2019     Sig - Route: Infuse 10 mL into a venous catheter As Needed for Line Care. - Intravenous    Cosign for Ordering: Accepted by " Paul Elizabeth MD on 12/1/2019  4:25 PM    sodium chloride 0.9 % flush 10 mL 10 mL Every 12 Hours Scheduled 12/1/2019     Sig - Route: Infuse 10 mL into a venous catheter Every 12 (Twelve) Hours. - Intravenous    sodium chloride 0.9 % flush 10 mL 10 mL As Needed 12/1/2019     Sig - Route: Infuse 10 mL into a venous catheter As Needed for Line Care. - Intravenous    Vitamin B1 tablet 100 mg 100 mg Daily 12/2/2019     Sig - Route: Take 1 tablet by mouth Daily. - Oral    insulin glargine (LANTUS) injection 30 Units (Discontinued) 30 Units Nightly 12/1/2019 12/4/2019    Sig - Route: Inject 30 Units under the skin into the appropriate area as directed Every Night. - Subcutaneous    Morphine sulfate (PF) injection 1 mg (Discontinued) 1 mg Every 3 Hours PRN 12/4/2019 12/4/2019    Sig - Route: Infuse 0.25 mL into a venous catheter Every 3 (Three) Hours As Needed for Severe Pain . - Intravenous             Physician Progress Notes (last 24 hours) (Notes from 12/03/19 1554 through 12/04/19 1554)      Gera Sandhu,  at 12/03/19 1618                [facility] Medicine Services Daily Progress Note      Hospitalist Team  LOS 2 days      Patient Care Team:  Carolina Tran FNP as PCP - General (Family Medicine)    Patient Location: 2124/1      Subjective   Subjective     Chief Complaint / Subjective  Chief Complaint   Patient presents with   • Altered Mental Status       HPI limited with confusion and dementia. Was agitated last night and needed restraints. At time of exam somewhat uncooperative but did allow for physical exam.     Brief Synopsis of Hospital Course/HPI  79-year-old male sent in from a mini home after he was noted to have altered mental status after lunch.  The patient apparently had been evaluated 10 days ago for worsening confusion and delirium but had a negative work-up at that time.  His blood sugar was reportedly normal.  There is no documented elevation of fever or chills although the  "patient's significant other noticed that he felt warm.  The patient apparently has recently been started on valproic acid for dementia features and no recent levels were reported.  Patient also apparently receives lorazepam for generalized anxiety     History from the wife.  She was generally unsatisfied with care at the [facility] inpatient facility.  Unknown why he was admitted over there.  Prior to that he was in nursing home and before that he was at [facility].  She states that he has not been home in about 2 months.  Mostly bedbound.  Needs plenty of help.  Known history of prostate cancer in the past with seed implantation.          Review of Systems   Unable to perform ROS: dementia         Objective   Objective      Vital Signs  Temp:  [97.9 °F (36.6 °C)-100.6 °F (38.1 °C)] 98.7 °F (37.1 °C)  Heart Rate:  [] 87  Resp:  [16-20] 18  BP: (110-145)/(66-88) 145/88  Oxygen Therapy  SpO2: 97 %  Pulse Oximetry Type: Continuous  Device (Oxygen Therapy): room air  Flow (L/min): 2  Flowsheet Rows      First Filed Value   Admission Height  182.9 cm (72\") Documented at 12/01/2019 1411   Admission Weight  79.4 kg (175 lb) Documented at 12/01/2019 1411        Intake & Output (last 3 days)       11/30 0701 - 12/01 0700 12/01 0701 - 12/02 0700 12/02 0701 - 12/03 0700 12/03 0701 - 12/04 0700    P.O.   360 480    Total Intake(mL/kg)   360 (5.7) 480 (7.6)    Urine (mL/kg/hr)  925  200 (0.3)    Stool    0    Total Output  925  200    Net  -925 +360 +280            Urine Unmeasured Occurrence   1 x 1 x    Stool Unmeasured Occurrence    1 x        Lines, Drains & Airways    Active LDAs     None                  Physical Exam:    Physical Exam   Constitutional: No distress.   HENT:   Head: Normocephalic.   Eyes: Pupils are equal, round, and reactive to light.   Pulmonary/Chest: Effort normal.   Diminished BS at the bases   Abdominal: Soft.   Neurological: He is alert.   Oriented x1   Psychiatric:   Agitated, confused  "         Results Review:     I reviewed the patient's new clinical results.      Lab Results (last 24 hours)     Procedure Component Value Units Date/Time    POC Glucose Once [[id number]]  (Normal) Collected:  12/03/19 1616    Specimen:  Blood Updated:  12/03/19 1618     Glucose 93 mg/dL      Comment: Serial Number: [id number]:  [id number]       Blood Culture - Blood, Arm, Right [[id number]] Collected:  12/01/19 1449    Specimen:  Blood from Arm, Right Updated:  12/03/19 1500     Blood Culture No growth at 2 days    Blood Culture - Blood, Arm, Left [[id number]] Collected:  12/01/19 1436    Specimen:  Blood from Arm, Left Updated:  12/03/19 1445     Blood Culture No growth at 2 days    POC Glucose Once [[id number]]  (Abnormal) Collected:  12/03/19 1129    Specimen:  Blood Updated:  12/03/19 1138     Glucose 157 mg/dL      Comment: Serial Number: [id number]:  [id number]       POC Glucose Once [[id number]]  (Abnormal) Collected:  12/03/19 0802    Specimen:  Blood Updated:  12/03/19 0804     Glucose 111 mg/dL      Comment: Serial Number: [id number]:  [id number]       POC Glucose Once [[id number]]  (Abnormal) Collected:  12/03/19 0719    Specimen:  Blood Updated:  12/03/19 0721     Glucose 40 mg/dL      Comment: Serial Number: [id number]:  [id number]       Basic Metabolic Panel [[id number]]  (Abnormal) Collected:  12/03/19 0542    Specimen:  Blood Updated:  12/03/19 0652     Glucose 55 mg/dL      BUN 14 mg/dL      Creatinine 0.74 mg/dL      Sodium 140 mmol/L      Potassium 3.3 mmol/L      Chloride 102 mmol/L      CO2 29.0 mmol/L      Calcium 8.2 mg/dL      eGFR Non African Amer 102 mL/min/1.73      BUN/Creatinine Ratio 18.9     Anion Gap 9.0 mmol/L     Narrative:       GFR Normal >60  Chronic Kidney Disease <60  Kidney Failure <15    POC Glucose Once [[id number]]  (Abnormal) Collected:  12/03/19 0422    Specimen:  Blood Updated:  12/03/19 0423     Glucose 150 mg/dL      Comment: Serial Number:  [id number]:  [id number]       POC Glucose Once [[id number]]  (Abnormal) Collected:  12/03/19 0404    Specimen:  Blood Updated:  12/03/19 0405     Glucose 31 mg/dL      Comment: Serial Number: [id number]:  [id number]       CBC & Differential [[id number]] Collected:  12/03/19 0305    Specimen:  Blood Updated:  12/03/19 0344    Narrative:       The following orders were created for panel order CBC & Differential.  Procedure                               Abnormality         Status                     ---------                               -----------         ------                     CBC Auto Differential[[id number]]        Abnormal            Final result                 Please view results for these tests on the individual orders.    CBC Auto Differential [[id number]]  (Abnormal) Collected:  12/03/19 0305    Specimen:  Blood Updated:  12/03/19 0344     WBC 7.60 10*3/mm3      RBC 3.26 10*6/mm3      Hemoglobin 10.2 g/dL      Hematocrit 30.8 %      MCV 94.7 fL      MCH 31.4 pg      MCHC 33.2 g/dL      RDW 15.6 %      RDW-SD 52.5 fl      MPV 6.6 fL      Platelets 303 10*3/mm3      Neutrophil % 70.0 %      Lymphocyte % 18.1 %      Monocyte % 10.9 %      Eosinophil % 0.3 %      Basophil % 0.7 %      Neutrophils, Absolute 5.30 10*3/mm3      Lymphocytes, Absolute 1.40 10*3/mm3      Monocytes, Absolute 0.80 10*3/mm3      Eosinophils, Absolute 0.00 10*3/mm3      Basophils, Absolute 0.10 10*3/mm3      nRBC 0.0 /100 WBC     POC Glucose Once [[id number]]  (Abnormal) Collected:  12/02/19 2217    Specimen:  Blood Updated:  12/02/19 2219     Glucose 193 mg/dL      Comment: Serial Number: [id number]:  [id number]       POC Glucose Once [[id number]]  (Abnormal) Collected:  12/02/19 1639    Specimen:  Blood Updated:  12/02/19 1642     Glucose 185 mg/dL      Comment: Serial Number: [id number]:  [id number]           No results found for: HGBA1C                Microbiology Results (last 10 days)     Procedure Component  Value - Date/Time    Respiratory Panel, PCR - Swab, Nasopharynx [[id number]]  (Abnormal) Collected:  12/01/19 2029    Lab Status:  Final result Specimen:  Swab from Nasopharynx Updated:  12/01/19 2154     ADENOVIRUS, PCR Not Detected     Coronavirus 229E Not Detected     Coronavirus HKU1 Not Detected     Coronavirus NL63 Not Detected     Coronavirus OC43 Not Detected     Human Metapneumovirus Detected     Human Rhinovirus/Enterovirus Not Detected     Influenza B PCR Not Detected     Parainfluenza Virus 1 Not Detected     Parainfluenza Virus 2 Not Detected     Parainfluenza Virus 3 Not Detected     Parainfluenza Virus 4 Not Detected     Bordetella pertussis pcr Not Detected     Influenza A H1 2009 PCR Not Detected     Chlamydophila pneumoniae PCR Not Detected     Mycoplasma pneumo by PCR Not Detected     Influenza A PCR Not Detected     Influenza A H3 Not Detected     Influenza A H1 Not Detected     RSV, PCR Not Detected    Urine Culture - Urine, Urine, Catheter [[id number]]  (Abnormal) Collected:  12/01/19 1702    Lab Status:  Final result Specimen:  Urine, Catheter Updated:  12/02/19 1328     Urine Culture Yeast isolated     Comment: No further work up.       Blood Culture - Blood, Arm, Right [[id number]] Collected:  12/01/19 1449    Lab Status:  Preliminary result Specimen:  Blood from Arm, Right Updated:  12/03/19 1500     Blood Culture No growth at 2 days    Blood Culture - Blood, Arm, Left [[id number]] Collected:  12/01/19 1436    Lab Status:  Preliminary result Specimen:  Blood from Arm, Left Updated:  12/03/19 1445     Blood Culture No growth at 2 days          ECG/EMG Results (most recent)     None                    Xr Chest 1 View    Result Date: 12/2/2019   1. Moderate interval worsening from December 1 2. Bilateral perihilar pneumonia is mild to moderate in degree along with change suggesting low-grade failure  Electronically Signed By-Yunier Coronel Jr. On:12/2/2019 7:24 AM This report was finalized on  [id number] by  Yunier Coronel Jr., .    Xr Chest 1 View    Result Date: 12/1/2019  1. 2 cm nodular opacity in the left upper chest is most likely due to calcified cartilage at the left first rib and. Recommend PA and lateral when possible to confirm. 2. Mild left basal infiltrate versus atelectasis.  Electronically Signed By-Joelle Luna On:12/1/2019 2:56 PM This report was finalized on [id number] by  Joelle Luna, .      Xrays, labs reviewed personally by physician.    Medication Review:   I have reviewed the patient's current medication list      Scheduled Meds    divalproex 250 mg Oral TID   famotidine 40 mg Oral BID   folic acid 1 mg Oral Daily   heparin (porcine) 5,000 Units Subcutaneous Q12H   insulin glargine 30 Units Subcutaneous Nightly   insulin lispro 0-9 Units Subcutaneous 4x Daily With Meals & Nightly   insulin lispro 5 Units Subcutaneous TID AC   LORazepam 1 mg Oral Nightly   metoprolol tartrate 12.5 mg Oral BID   piperacillin-tazobactam 3.375 g Intravenous Q8H   sodium chloride 10 mL Intravenous Q12H   thiamine 100 mg Oral Daily       Meds Infusions       Meds PRN  •  acetaminophen **OR** acetaminophen **OR** acetaminophen  •  acetaminophen  •  bisacodyl  •  dextrose  •  dextrose  •  glucagon (human recombinant)  •  haloperidol lactate  •  insulin lispro **AND** insulin lispro  •  lactated ringers  •  polyethylene glycol  •  prochlorperazine  •  QUEtiapine  •  sodium chloride  •  sodium chloride    I personally reviewed patient's EKG    Assessment/Plan   Assessment/Plan     Active Hospital Problems:  * Sepsis due to pneumonia (CMS/HCC)- (present on admission)    Resolving  Cx negative thus far      HCAP (healthcare-associated pneumonia)- (present on admission)    Empiric antibiotics  Supportive treatment  Cx negative thus far      Toxic metabolic encephalopathy    D/t PNA and sepsis  tx underlying causes       Dementia without behavioral disturbance (CMS/HCC)    Psych following  Continue  medication regimen      Cellulitis of right lower extremity- (present on admission)    Continue zosyn     Primary hypertension- (present on admission)    Continue home medications       Uncontrolled type 2 diabetes mellitus with hyperglycemia (CMS/[facility])- (present on admission)    Insulin basal bolus               VTE Prophylaxis - heparin.      Code Status -   Code Status and Medical Interventions:   Ordered at: 12/01/19 1925     Code Status:    CPR     Medical Interventions (Level of Support Prior to Arrest):    Full       Discharge Planning    Was from In behavioral health unit, now family wants rehab and  following    Electronically signed by Gera Sandhu DO, 12/03/19, 4:19 PM.  [facility] Hospitalist Team          Electronically signed by Gera Sandhu DO at 12/03/19 1621          Consult Notes (most recent note)      Valery Mauro PA-C at 12/02/19 1234     Attestation signed by Leyla Westfall MD at 12/03/19 7217    I have reviewed the mid-level documentation, and agree with the documentation, medical decision making and treatment plan as outlined by the mid-level provider.    This document has been electronically signed by Leyla Westfall MD  December 3, 2019 7:54 AM                                                                    Referring Provider: Dr. Hansen  Reason for Consultation: Mental status changes       Chief complaint: No complaints voiced    Subjective .     History of present illness:  The patient is a 79 y.o.white male with PMH significant for Alzheimers dementia, prostate cancer, Diabetes, and HTN who was admitted secondary to Mental status changes.  Psych was consulted by Dr. Hansen.  Mental status changes.  Patient is a poor historian.  His wife is at bedside and able to help with history.  She reports that he was diagnosed with Alzheimer's dementia in 2017.  He has continued to decline, has some behavioral issues and was sent from rehab to [facility].  His wife spoke  poorly of the new facility in their care for her .  He is currently on Depakote and lorazepam for anxiety and agitation issues.  His wife says that prior to his diagnosis of dementia, he had no previous psychiatric history.  He had never been on any psychiatric medication for now.  His wife says that at his baseline, he still recognizes her, but today he is very confused and does not recognize her, does not know where he is, his date of birth, the current date.  He reported that he had no children, but his wife says they have 2.  Per nursing, he has been cooperative without any significant agitation or aggression.    Past psychiatric history: Unremarkable prior to dementia diagnosis in 2017.  Review of Systems   Unable to perform ROS: Dementia        History    Past Medical History:   Diagnosis Date   • Alzheimer disease (CMS/[facility])    • Cancer (CMS/[facility])     prostate   • Diabetes mellitus (CMS/[facility])    • Hypertension         History reviewed. No pertinent family history.     Social History     Tobacco Use   • Smoking status: Never Smoker   • Smokeless tobacco: Never Used   Substance Use Topics   • Alcohol use: No   • Drug use: Defer          Medications Prior to Admission   Medication Sig Dispense Refill Last Dose   • divalproex (DEPAKOTE) 125 MG DR tablet Take 250 mg by mouth 3 (Three) Times a Day.   12/1/2019 at Unknown time   • famotidine (PEPCID) 20 MG tablet Take 40 mg by mouth 2 (Two) Times a Day.   12/1/2019 at Unknown time   • folic acid (FOLVITE) 1 MG tablet Take 1 mg by mouth Daily.   12/1/2019 at Unknown time   • haloperidol lactate (HALDOL) 5 MG/ML injection Inject 5 mg into the appropriate muscle as directed by prescriber Every 6 (Six) Hours As Needed for Agitation.   Past Week at Unknown time   • insulin glargine (LANTUS) 100 UNIT/ML injection Inject 15 Units under the skin into the appropriate area as directed Daily.      • insulin lispro (humaLOG) 100 UNIT/ML injection Inject  under the skin into  the appropriate area as directed 4 (Four) Times a Day After Meals & at Bedtime. Sliding scale   12/1/2019 at Unknown time   • insulin lispro (humaLOG) 100 UNIT/ML injection Inject 5 Units under the skin into the appropriate area as directed 3 (Three) Times a Day Before Meals.   12/1/2019 at Unknown time   • LORazepam (ATIVAN) 1 MG tablet Take 1 mg by mouth every night at bedtime.   11/30/2019 at Unknown time   • LORazepam (ATIVAN) 2 MG/ML injection Inject 1 mg into the appropriate muscle as directed by prescriber Every 6 (Six) Hours As Needed for Anxiety.   Past Week at Unknown time   • metoprolol tartrate (LOPRESSOR) 25 MG tablet Take 12.5 mg by mouth 2 (Two) Times a Day.   12/1/2019 at Unknown time   • QUEtiapine (SEROquel) 25 MG tablet Take 25 mg by mouth Every 4 (Four) Hours As Needed (for agitation).   Past Week at Unknown time   • temazepam (RESTORIL) 15 MG capsule Take 15 mg by mouth every night at bedtime.   11/30/2019 at Unknown time   • thiamine (VITAMIN B-1) 100 MG tablet Take 100 mg by mouth 3 (Three) Times a Day.   12/1/2019 at Unknown time   • ziprasidone (GEODON) 20 MG injection Inject 10 mg into the appropriate muscle as directed by prescriber Every 12 (Twelve) Hours As Needed for Agitation.   Past Week at Unknown time   • acetaminophen (TYLENOL) 325 MG tablet Take 650 mg by mouth Every 6 (Six) Hours As Needed for Mild Pain , Moderate Pain  or Fever.   Unknown at Unknown time   • loperamide (IMODIUM) 2 MG capsule Take 2 mg by mouth 4 (Four) Times a Day As Needed for Diarrhea.   Unknown at Unknown time   • magnesium hydroxide (MILK OF MAGNESIA) 400 MG/5ML suspension Take 30 mL by mouth Daily As Needed for Constipation or Heartburn.   Unknown at Unknown time   • polyethylene glycol (MIRALAX) packet Take 17 g by mouth Daily As Needed (for constipation).   Unknown at Unknown time       Scheduled Meds:  cefepime 2 g Intravenous Q8H   divalproex 250 mg Oral TID   famotidine 40 mg Oral BID   folic acid 1 mg  "Oral Daily   heparin (porcine) 5,000 Units Subcutaneous Q12H   insulin glargine 30 Units Subcutaneous Nightly   insulin lispro 0-9 Units Subcutaneous 4x Daily With Meals & Nightly   insulin lispro 5 Units Subcutaneous TID AC   LORazepam 1 mg Oral Nightly   metoprolol tartrate 12.5 mg Oral BID   sodium chloride 10 mL Intravenous Q12H   thiamine 100 mg Oral Daily     Continuous Infusions:   PRN Meds:.•  acetaminophen **OR** acetaminophen **OR** acetaminophen  •  acetaminophen  •  bisacodyl  •  dextrose  •  dextrose  •  glucagon (human recombinant)  •  haloperidol lactate  •  insulin lispro **AND** insulin lispro  •  lactated ringers  •  polyethylene glycol  •  prochlorperazine  •  QUEtiapine  •  sodium chloride  •  sodium chloride     Allergies:  Patient has no known allergies.      Objective     Vital Signs   /45   Pulse 70   Temp 98.6 °F (37 °C) (Oral)   Resp 16   Ht 182.9 cm (72\")   Wt 79.4 kg (175 lb)   SpO2 96%   BMI 23.73 kg/m²      Physical Exam:     General Appearance:    NAD   Head:    Normocephalic, without obvious abnormality, atraumatic   Eyes:            Lids and lashes normal, conjunctivae and sclerae normal, no   icterus, no pallor, corneas clear, PERRLA   Skin:   No bleeding, bruising or rash          Neurologic:   Cranial nerves 2 - 12 grossly intact, sensation intact, DTR       present and equal bilaterally         Mental Status Exam:   Hygiene:   fair  Cooperation:  Guarded  Eye Contact:  Poor  Psychomotor Behavior:  Slow  Affect:  Restricted  Mood: fluctates  Hopelessness: Denies  Speech:  Minimal  Thought Process:  Unable to demonstrate  Thought Content:  Unable to demonstrate  Suicidal:  None  Homicidal:  None  Hallucinations:  Not demonstrated today  Delusion:  Unable to demonstrate  Memory:  Deficits  Orientation:  Person  Reliability:  poor  Insight:  Poor  Judgement:  Poor  Impulse Control:  Impaired  Physical/Medical Issues:  Yes Pneumonia, dementia     Medications and allergies " were reviewed by this provider.     Lab Results   Component Value Date    GLUCOSE 115 (H) 12/02/2019    CALCIUM 7.8 (L) 12/02/2019     12/02/2019    K 4.0 12/02/2019    CO2 28.0 12/02/2019     12/02/2019    BUN 17 12/02/2019    CREATININE 0.84 12/02/2019    EGFRIFNONA 88 12/02/2019    BCR 20.2 12/02/2019    ANIONGAP 8.0 12/02/2019       Last Urine Toxicity     LAST URINE TOXICITY RESULTS Latest Ref Rng & Units 9/19/2018    CREATININE UR mg/dL 19.4    BARBITURATES SCREEN Negative Negative    BENZODIAZEPINE SCREEN, URINE Negative Negative    COCAINE SCREEN, URINE Negative Negative    METHADONE SCREEN, URINE Negative Negative          No results found for: PHENYTOIN, PHENOBARB, VALPROATE, CBMZ    Lab Results   Component Value Date     12/02/2019    BUN 17 12/02/2019    CREATININE 0.84 12/02/2019    TSH 1.050 09/19/2018    WBC 9.00 12/02/2019       Brief Urine Lab Results  (Last result in the past 365 days)      Color   Clarity   Blood   Leuk Est   Nitrite   Protein   CREAT   Urine HCG        12/01/19 1702 Red Cloudy Large (3+) Moderate (2+) Negative 100 mg/dL (2+)               ECG/EMG Results (last 72 hours)     ** No results found for the last 72 hours. **            Assessment/Plan       Sepsis due to pneumonia (CMS/[facility])    Uncontrolled type 2 diabetes mellitus with hyperglycemia (CMS/[facility])    Hyperlipidemia    HCAP (healthcare-associated pneumonia)    Primary hypertension    Cellulitis of right lower extremity       LABS: Reviewed    Assessment:   Delirium due to medical condition (Pnuemonia)  Alzheimer's dementia with behavioral disturbances.    Treatment Plan: Patient is alert but oriented only to person, confused.  Per nursing, he has been cooperative with no significant aggression or agitation.  Continue Depakote 250 mg 3 times daily.  Continue lorazepam Ativan 1 mg nightly  Continue Seroquel as needed mild agitation and Haldol IV for the severe agitation.  Will follow      Treatment Plan  discussed with: Patient, Family and Nursing      I discussed the patients findings and my recommendations with patient, family and nursing staff    I have reviewed and approved the behavioral health treatment plans and problem list. Yes  Thank you for the consult   Referring MD has access to the consult report and progress notes in EMR     Valery Mauro PA-C  19  1:34 PM              Electronically signed by Leyla Westfall MD at 19 [phone]          Physical Therapy Notes (most recent note)      Reyes, Carmela, MARKO at 19 1233  Version 1 of 1         Patient Name: Yuriy Bennett  : 1940    MRN: [id number]                              Today's Date: 12/3/2019       Admit Date: 2019    Visit Dx:     ICD-10-CM ICD-9-CM   1. Pneumonia of right lower lobe due to infectious organism (CMS/[facility]) J18.1 486   2. Delirium due to another medical condition F05 293.0   3. Alzheimer's dementia with behavioral disturbance, unspecified timing of dementia onset (CMS/HCC) G30.9 331.0    F02.81 294.11   4. Sepsis without acute organ dysfunction, due to unspecified organism (CMS/HCC) A41.9 038.9     995.91   5. Mass of left chest wall R22.2 786.6     Patient Active Problem List   Diagnosis   • Cystitis   • Alzheimer's dementia with behavioral disturbance (CMS/HCC)   • Uncontrolled type 2 diabetes mellitus with hyperglycemia (CMS/HCC)   • H/O partial seizures   • History of prostate cancer   • Hyperlipidemia   • Noncompliance with medication regimen   • Nephrolithiasis   • Proteinuria   • HCAP (healthcare-associated pneumonia)   • Primary hypertension   • Sepsis due to pneumonia (CMS/HCC)   • Cellulitis of right lower extremity   • Dementia without behavioral disturbance (CMS/HCC)   • Toxic metabolic encephalopathy     Past Medical History:   Diagnosis Date   • Alzheimer disease (CMS/HCC)    • Cancer (CMS/HCC)     prostate   • Diabetes mellitus (CMS/HCC)    • Hypertension      History reviewed. No pertinent  surgical history.  General Information     Row Name 12/03/19 1155          PT Evaluation Time/Intention    Document Type  evaluation  -CR     Mode of Treatment  physical therapy  -CR     Row Name 12/03/19 1155          General Information    Patient Profile Reviewed?  yes  -CR     Prior Level of Function  -- unsure of PLOF  -CR     Barriers to Rehab  cognitive status  -CR     Row Name 12/03/19 1155          Relationship/Environment    Lives With  -- from Behavior center; prior to that was in a rehab  -CR     Row Name 12/03/19 1155          Home Main Entrance    Number of Stairs, Main Entrance  -- unknown  -CR     Row Name 12/03/19 1155          Cognitive Assessment/Intervention- PT/OT    Orientation Status (Cognition)  person  -CR     Row Name 12/03/19 1155          Safety Issues, Functional Mobility    Impairments Affecting Function (Mobility)  cognition;balance;endurance/activity tolerance  -CR       User Key  (r) = Recorded By, (t) = Taken By, (c) = Cosigned By    Initials Name Provider Type    CR Reyes, Carmela, PT Physical Therapist        Mobility     Row Name 12/03/19 1158          Bed Mobility Assessment/Treatment    Bed Mobility Assessment/Treatment  supine-sit;sit-supine  -CR     Supine-Sit [place] (Bed Mobility)  minimum assist (75% patient effort) pt went into long sitting   -CR     Sit-Supine [place] (Bed Mobility)  minimum assist (75% patient effort) needed assist with LE  -CR     Row Name 12/03/19 1158          Sit-Stand Transfer    Sit-Stand [place] (Transfers)  minimum assist (75% patient effort)  -CR     Row Name 12/03/19 1158          Gait/Stairs Assessment/Training    [place] Level (Gait)  not tested  -CR       User Key  (r) = Recorded By, (t) = Taken By, (c) = Cosigned By    Initials Name Provider Type    CR Reyes, Carmela, PT Physical Therapist        Obj/Interventions     Row Name 12/03/19 1159          General ROM    GENERAL ROM COMMENTS  active BLE WFL  -CR     Row Name  12/03/19 1159          MMT (Manual Muscle Testing)    General MMT Comments  grossly 3/5  -CR     Row Name 12/03/19 1159          Static Sitting Balance    Level of [place] (Unsupported Sitting, Static Balance)  independent  -CR     Sitting Position (Unsupported Sitting, Static Balance)  sitting on edge of bed  -CR     Time Able to Maintain Position (Unsupported Sitting, Static Balance)  more than 5 minutes  -CR     Row Name 12/03/19 1159          Dynamic Sitting Balance    Level of [place], Reaches Outside Midline (Sitting, Dynamic Balance)  supervision  -CR     Sitting Position, Reaches Outside Midline (Sitting, Dynamic Balance)  sitting on edge of bed  -CR     Row Name 12/03/19 1159          Static Standing Balance    Level of [place] (Supported Standing, Static Balance)  minimal assist, 75% patient effort  -CR     Time Able to Maintain Position (Supported Standing, Static Balance)  45 to 60 seconds  -CR     Row Name 12/03/19 1159          Dynamic Standing Balance    Level of [place], Reaches Outside Midline (Standing, Dynamic Balance)  minimal assist, 75% patient effort  -CR     Time Able to Maintain Position, Reaches Outside Midline (Standing, Dynamic Balance)  30 to 45 seconds  -CR     Row Name 12/03/19 1159          Sensory Assessment/Intervention    Sensory General Assessment  no sensation deficits identified  -CR       User Key  (r) = Recorded By, (t) = Taken By, (c) = Cosigned By    Initials Name Provider Type    CR Reyes, Carmela, PT Physical Therapist        Goals/Plan     Row Name 12/03/19 1206          Bed Mobility Goal 1 (PT)    Activity/Assistive Device (Bed Mobility Goal 1, PT)  bed mobility activities, all  -CR     [place] Level/Cues Needed (Bed Mobility Goal 1, PT)  conditional independence  -CR     Time Frame (Bed Mobility Goal 1, PT)  2 weeks  -CR     Row Name 12/03/19 1206          Transfer Goal 1 (PT)    Activity/Assistive Device (Transfer Goal 1, PT)  transfers, all   -CR     [place] Level/Cues Needed (Transfer Goal 1, PT)  supervision required  -CR     Time Frame (Transfer Goal 1, PT)  2 weeks  -CR     Row Name 12/03/19 1206          Gait Training Goal 1 (PT)    Activity/Assistive Device (Gait Training Goal 1, PT)  gait (walking locomotion);assistive device use;improve balance and speed;increase endurance/gait distance;decrease fall risk;walker, rolling  -CR     [place] Level (Gait Training Goal 1, PT)  minimum assist (75% or more patient effort)  -CR     Distance (Gait Goal 1, PT)  50  -CR     Time Frame (Gait Training Goal 1, PT)  2 weeks  -CR       User Key  (r) = Recorded By, (t) = Taken By, (c) = Cosigned By    Initials Name Provider Type    CR Reyes, Carmela, PT Physical Therapist        Clinical Impression     Row Name 12/03/19 1203          Pain Assessment    Additional Documentation  Pain Scale: FACES Pre/Post-Treatment (Group)  -CR     Row Name 12/03/19 1203          Pain Scale: FACES Pre/Post-Treatment    Pain: FACES Scale, Pretreatment  0-->no hurt  -CR     Pain: FACES Scale, Post-Treatment  0-->no hurt  -CR     Row Name 12/03/19 1203          Plan of Care Review    Plan of Care Reviewed With  patient  -CR     Row Name 12/03/19 1203          Physical Therapy Clinical Impression    Criteria for Skilled Interventions Met (PT Clinical Impression)  yes;treatment indicated  -CR     Rehab Potential (PT Clinical Summary)  good, to achieve stated therapy goals  -CR     Predicted Duration of Therapy (PT)  d/c  -CR     Row Name 12/03/19 1203          Vital Signs    Intra SpO2 (%)  95  -CR     O2 Delivery Intra Treatment  room air  -CR     Row Name 12/03/19 1203          Positioning and Restraints    Pre-Treatment Position  in bed  -CR     Post Treatment Position  bed  -CR     In Bed  notified nsg;sitting;call light within reach;exit alarm on  -CR     Restraints  reapplied:  -CR       User Key  (r) = Recorded By, (t) = Taken By, (c) = Cosigned By    Initials Name  Provider Type    CR Reyes, Carmela, PT Physical Therapist        Outcome Measures     Row Name 12/03/19 1209          How much help from another person do you currently need...    Turning from your back to your side while in flat bed without using bedrails?  4  -CR     Moving from lying on back to sitting on the side of a flat bed without bedrails?  3  -CR     Moving to and from a bed to a chair (including a wheelchair)?  3  -CR     Standing up from a chair using your arms (e.g., wheelchair, bedside chair)?  3  -CR     Climbing 3-5 steps with a railing?  2  -CR     To walk in hospital room?  2  -CR     AM-PAC 6 Clicks Score (PT)  17  -CR     Row Name 12/03/19 1209          Functional Assessment    Outcome Measure Options  AM-PAC 6 Clicks Basic Mobility (PT)  -CR       User Key  (r) = Recorded By, (t) = Taken By, (c) = Cosigned By    Initials Name Provider Type    CR Reyes, Carmela, PT Physical Therapist        Physical Therapy Education     Title: PT OT SLP Therapies (In Progress)     Topic: Physical Therapy (In Progress)     Point: Mobility training (In Progress)     Learning Progress Summary           Patient Acceptance, E, NR by CR at 12/3/2019 12:10 PM                               User Key     Initials Effective Dates Name Provider Type Discipline    CR 03/01/19 -  Reyes, Carmela, PT Physical Therapist PT              PT Recommendation and Plan  Planned Therapy Interventions (PT Eval): balance training, bed mobility training, gait training, strengthening, transfer training, patient/family education  Outcome Summary/Treatment Plan (PT)  Anticipated Discharge Disposition (PT): inpatient rehabilitation facility  Plan of Care Reviewed With: patient  Outcome Summary: 78 y/o male admitted 12/1 due to delirium and found to have +HMPV. Pt is unable to report PLOF and medical record indicate pt was in a rehab facility and was transferred to IP behavior center prior to hospital admit. Pt required min A for supine <>sit  tranfers. Pt able to sit EOB unsupported to eat breakfast. Pt required min A to come to standing and managed short shuffling steps fwd/back and side steps. Anticipate pt will do well using rw and will be followed while in hospital to progress as tolerated. Recommend rehab services at d/c .     Time Calculation:   PT Charges     Row Name 19 1230             Time Calculation    Start Time  0831  -CR      Stop Time  0900  -CR      Time Calculation (min)  29 min  -CR      PT Received On  19  -CR      PT - Next Appointment  19  -CR      PT Goal Re-Cert Due Date  19  -CR         Time Calculation- PT    Total Timed Code Minutes- PT  8 minute(s)  -CR        User Key  (r) = Recorded By, (t) = Taken By, (c) = Cosigned By    Initials Name Provider Type    CR Reyes, Carmela, PT Physical Therapist        Therapy Charges for Today     Code Description Service Date Service Provider Modifiers Qty    98114312585 HC PT EVAL HIGH COMPLEXITY 4 12/3/2019 Reyes, Carmela, PT GP 1    95909520983 HC PT THERAPEUTIC ACT EA 15 MIN 12/3/2019 Reyes, Carmela, PT GP 1          PT G-Codes  Outcome Measure Options: AM-PAC 6 Clicks Basic Mobility (PT)  AM-PAC 6 Clicks Score (PT): 17    Carmela Reyes, PT  12/3/2019         Electronically signed by Reyes, Carmela, PT at 19 1233          Occupational Therapy Notes (most recent note)      Lillian Pack, OT at 19 1350          Acute Care - Occupational Therapy Initial Evaluation   [facility]     Patient Name: Yuriy Bennett  : 1940  MRN: [id number]  Today's Date: 12/3/2019             Admit Date: 2019       ICD-10-CM ICD-9-CM   1. Pneumonia of right lower lobe due to infectious organism (CMS/[facility]) J18.1 486   2. Delirium due to another medical condition F05 293.0   3. Alzheimer's dementia with behavioral disturbance, unspecified timing of dementia onset (CMS/[facility]) G30.9 331.0    F02.81 294.11   4. Sepsis without acute organ dysfunction, due to  unspecified organism (CMS/HCC) A41.9 038.9     995.91   5. Mass of left chest wall R22.2 786.6     Patient Active Problem List   Diagnosis   • Cystitis   • Alzheimer's dementia with behavioral disturbance (CMS/HCC)   • Uncontrolled type 2 diabetes mellitus with hyperglycemia (CMS/[facility])   • H/O partial seizures   • History of prostate cancer   • Hyperlipidemia   • Noncompliance with medication regimen   • Nephrolithiasis   • Proteinuria   • HCAP (healthcare-associated pneumonia)   • Primary hypertension   • Sepsis due to pneumonia (CMS/[facility])   • Cellulitis of right lower extremity   • Dementia without behavioral disturbance (CMS/HCC)   • Toxic metabolic encephalopathy     Past Medical History:   Diagnosis Date   • Alzheimer disease (CMS/[facility])    • Cancer (CMS/[facility])     prostate   • Diabetes mellitus (CMS/[facility])    • Hypertension      History reviewed. No pertinent surgical history.       OT ASSESSMENT FLOWSHEET (last 12 hours)      Occupational Therapy Evaluation     Row Name 12/03/19 0951                   OT Evaluation Time/Intention    Subjective Information  no complaints  -SR        Document Type  evaluation  -SR        Mode of Treatment  occupational therapy  -SR        Comment  Unsure of PLOF.  Pt from inpatient [facility] hospital.    -SR           General Information    Pertinent History of Current Functional Problem  Pt admitted for RLE cellulitis, pna, and sepsis.  He has hx of Alzheimer's Dementia with behavioral disturbance.  -SR        Existing Precautions/Restrictions  fall  -SR        Barriers to Rehab  cognitive status  -SR           Relationship/Environment    Lives With  facility resident  -SR           Resource/Environmental Concerns    Current Living Arrangements  residential facility  -SR           Cognitive Assessment/Intervention- PT/OT    Orientation Status (Cognition)  oriented to;person;disoriented to;place;situation;time Unable to provide birthday.   -SR           Safety Issues, Functional Mobility     Impairments Affecting Function (Mobility)  cognition;balance  -SR           Bed Mobility Assessment/Treatment    Bed Mobility Assessment/Treatment  supine-sit;sit-supine  -SR        Supine-Sit [place] (Bed Mobility)  minimum assist (75% patient effort)  -SR        Sit-Supine [place] (Bed Mobility)  minimum assist (75% patient effort)  -SR           Functional Mobility    Functional Mobility- Ind. Level  minimum assist (75% patient effort);2 person assist required  -SR        Functional Mobility- Comment  Provided with HHA pt able to walk around bed.   -SR           Transfer Assessment/Treatment    Transfer Assessment/Treatment  sit-stand transfer  -SR           Sit-Stand Transfer    Sit-Stand [place] (Transfers)  minimum assist (75% patient effort)  -SR           ADL Assessment/Intervention    BADL Assessment/Intervention  toileting;lower body dressing;grooming  -SR           Lower Body Dressing Assessment/Training    Lower Body Dressing [place] Level  minimum assist (75% patient effort);don;socks  -SR           Grooming Assessment/Training    [place] Level (Grooming)  supervision;set up;hair care, combing/brushing  -SR           Toileting Assessment/Training    [place] Level (Toileting)  dependent (less than 25% patient effort)  -SR        Comment (Toileting)  Pt incontinent with standing and urinated on socks and floor without noticing.  Urinal placed for patient.    -SR           General ROM    GENERAL ROM COMMENTS  BUE WFL   -SR           MMT (Manual Muscle Testing)    General MMT Comments  BUE grossly 4-/5.  Difficulty following commands due to confusion.   -SR           Static Sitting Balance    Level of [place] (Unsupported Sitting, Static Balance)  supervision  -SR           Dynamic Sitting Balance    Level of [place], Reaches Outside Midline (Sitting, Dynamic Balance)  supervision  -SR           Static Standing Balance    Level of [place] (Supported Standing,  Static Balance)  minimal assist, 75% patient effort  -SR           Dynamic Standing Balance    Level of [place], Reaches Outside Midline (Standing, Dynamic Balance)  minimal assist, 75% patient effort  -SR           Positioning and Restraints    Pre-Treatment Position  in bed  -SR        Post Treatment Position  bed  -SR        In Bed  fowlers;exit alarm on;encouraged to call for assist;call light within reach  -SR        Restraints  reapplied:  -SR           Planned OT Interventions    Planned Therapy Interventions (OT Eval)  activity tolerance training;BADL retraining;cognitive/visual perception retraining;functional balance retraining;occupation/activity based interventions;strengthening exercise;transfer/mobility retraining  -SR           OT Goals    Dressing Goal Selection (OT)  dressing, OT goal 1  -SR        Toileting Goal Selection (OT)  toileting, OT goal 1  -SR           Dressing Goal 1 (OT)    Activity/Assistive Device (Dressing Goal 1, OT)  dressing skills, all  -SR        [place]/Cues Needed (Dressing Goal 1, OT)  supervision required  -SR        Time Frame (Dressing Goal 1, OT)  2 weeks  -SR           Toileting Goal 1 (OT)    Activity/Device (Toileting Goal 1, OT)  toileting skills, all  -SR        [place] Level/Cues Needed (Toileting Goal 1, OT)  minimum assist (75% or more patient effort)  -SR        Time Frame (Toileting Goal 1, OT)  2 weeks  -SR          User Key  (r) = Recorded By, (t) = Taken By, (c) = Cosigned By    Initials Name Effective Dates    SR Lillian Pack, OT 03/01/19 -          Occupational Therapy Education     Title: PT OT SLP Therapies (In Progress)     Topic: Occupational Therapy (In Progress)     Point: ADL training (Done)     Description: Instruct learner(s) on proper safety adaptation and remediation techniques during self care or transfers.   Instruct in proper use of assistive devices.    Learning Progress Summary           Patient Acceptance, E,TB, VU  by SR at 12/3/2019  1:49 PM                               User Key     Initials Effective Dates Name Provider Type Discipline    SR 03/01/19 -  Lillian Pack OT Occupational Therapist OT                  OT Recommendation and Plan  Planned Therapy Interventions (OT Eval): activity tolerance training, BADL retraining, cognitive/visual perception retraining, functional balance retraining, occupation/activity based interventions, strengthening exercise, transfer/mobility retraining  Plan of Care Review  Plan of Care Reviewed With: patient  Plan of Care Reviewed With: patient  Outcome Summary: Pt is 79 year old male admitted for delerium and found to have HMPV, pna, and UTI.  He is very confused at time of evauation and unable to provide PLOF.  He was residing in inpatient psych facility prior to admit.  He was able to mobilize with min-CGA.  Followed one step commands for ADLs.  He will reuqire IP rehab at discharge.         Time Calculation:   Time Calculation- OT     Row Name 12/03/19 1350             Time Calculation- OT    OT Start Time  0951  -SR      OT Stop Time  1014  -SR      OT Time Calculation (min)  23 min  -SR      Total Timed Code Minutes- OT  16 minute(s)  -SR      OT Non-Billable Time (min)  7 min  -SR      OT Received On  12/03/19  -SR      OT - Next Appointment  12/05/19  -SR      OT Goal Re-Cert Due Date  12/17/19  -SR        User Key  (r) = Recorded By, (t) = Taken By, (c) = Cosigned By    Initials Name Provider Type    SR Lillian Pack OT Occupational Therapist        Therapy Charges for Today     Code Description Service Date Service Provider Modifiers Qty    35980701035 HC OT EVAL MOD COMPLEXITY 4 12/3/2019 Lillian Pack, OT GO 1    64288248443 HC OT THERAPEUTIC ACT EA 15 MIN 12/3/2019 Lillian Pack, OT GO 1    72780609652 HC OT SELF CARE/MGMT/TRAIN EA 15 MIN 12/3/2019 Lillian Pack, OT GO 1               Lillian Pack  OT  12/3/2019    Electronically signed by Lillian Pack, OT at 12/03/19 6301

## 2019-12-04 NOTE — PLAN OF CARE
Problem: Fall Risk (Adult)  Goal: Identify Related Risk Factors and Signs and Symptoms  Outcome: Ongoing (interventions implemented as appropriate)    Goal: Absence of Fall  Outcome: Ongoing (interventions implemented as appropriate)      Problem: Patient Care Overview  Goal: Plan of Care Review  Outcome: Ongoing (interventions implemented as appropriate)   12/04/19 0520   Coping/Psychosocial   Plan of Care Reviewed With patient   Plan of Care Review   Progress improving   OTHER   Outcome Summary continue antbx, family to decide placement       Problem: Skin Injury Risk (Adult)  Goal: Identify Related Risk Factors and Signs and Symptoms  Outcome: Ongoing (interventions implemented as appropriate)    Goal: Skin Health and Integrity  Outcome: Ongoing (interventions implemented as appropriate)      Problem: Restraint, Nonbehavioral (Nonviolent)  Goal: Rationale and Justification  Outcome: Outcome(s) achieved Date Met: 12/04/19    Goal: Nonbehavioral (Nonviolent) Restraint: Absence of Injury/Harm  Outcome: Outcome(s) achieved Date Met: 12/04/19    Goal: Nonbehavioral (Nonviolent) Restraint: Achievement of Discontinuation Criteria  Outcome: Outcome(s) achieved Date Met: 12/04/19    Goal: Nonbehavioral (Nonviolent) Restraint: Preservation of Dignity and Wellbeing  Outcome: Outcome(s) achieved Date Met: 12/04/19

## 2019-12-04 NOTE — PROGRESS NOTES
Continued Stay Note   [facility]     Patient Name: Yuriy Bennett  MRN: [id number]  Today's Date: 12/4/2019    Admit Date: 12/1/2019    Discharge Plan     Row Name 12/04/19 7152       Plan    Plan  DC Plan: [facility] (VERNA Zelaya) - referral pending. PASRR approved. Will require precert. Needs to remain free of sitter/restraint 24 hours prior to d/c.     Plan Comments  SW met with pt's spouse (Dorys, [phone]) at bedside who agreed to SW making referral to [facility] (liaison, Faye, [phone]). Pt has remained sitter free & restraint free at this time for more than 24 hours. SW informed pt's spouse to continue to consider other rehab choices in case facility is unable to accept clinically, spouse verbalized understanding.      SAILAJA Ramos    Phone: [phone]  Cell: [phone]  Fax: [phone]  [email or web address]

## 2019-12-04 NOTE — CONSULTS
Nutrition Services    Patient Name:  Yuriy Bennett  YOB: 1940  MRN: [id number]  Admit Date:  12/1/2019    Comment:    Avg meal intakes 71%. RN confirms good PO Intakes. Will continue to monitor and will add interventions as indicated.     Reason for Assessment     Row Name           Reason for Assessment    Reason For Assessment Nursing Admission Screen       Diagnosis H&P: Alzheimer's diesease, Prostate Cancer, DM, HTN     Current Problems:   Admitted for worsening confusion and delirium    Sepsis r/t PNA    HCAP    TME    Dementia- pt is agitated and aggressive    Cellulitis of LE    Uncontrolled DM          Nutrition/Diet History     Row Name           Nutrition/Diet History    Typical Food/Fluid Intake Spoke with RN who reports pt is very confused and agitated at times. Pt was reported to be swinging at night staff. Upon entering the room pt appeared agitated, attempting to get out of chair, setting of chair alarm. RN made aware.        Functional Status From [facility]        Food Allergies  NKFA        Factors Affecting Nutritional Intake  Dementia          Anthropometrics             Anthropometrics    Height 72 inches     Weight 63.6 kg (140 lb 3.4 oz)    12/4/19        Admit Weight    Admit Weight 63.4 kg (139 lb 12.4 oz)- 12/3/19  79.4 kg (175lb)- 12/1/19     Highly ? Weight changes, pt appears maybe btw these two weights?        Ideal Body Weight (IBW)    Ideal Body Weight (IBW) (kg) 178#     % Ideal Body Weight 79%        Usual Body Weight (UBW)    Usual Body Weight Unable to determine     % Usual Body Weight Unable to determine     Weight Hx  136# (9/19/18)        Body Mass Index (BMI)    BMI (kg/m2) 19.02            Labs/Tests/Procedures/Meds                Labs    Pertinent Lab Results Comments Gluc 47L/106H, Crt 0.74L, Hgb 10.5 L, Hct 31.4 L         Medications    Pertinent Medications Comments Depakote, Pepcid, folvite, lantus, humalog, ativan, zosyn, B1, SSI          Physical Findings                 Physical Findings    Overall Physical Appearance Unable to determine- pt appears perhaps appropriate for age and condition, however did not conduct NFPE r/t agitation.        Gastrointestinal + BM 12/4        Tubes None at this time        Oral/Mouth Cavity Noted on a textured modified diet- has not been seen by ST        Skin Intact          Estimated/Assessed Needs              Calculation Measurements    Weight Used For Calculations      Height         KCAL/KG    KCAL/KG               Protein Requirements    Weight Used For Protein Calculations      Est Protein Requirement Amount (gms/kg)      Estimated Protein Requirements (gms/day)         Fluid Requirements    Estimated Fluid Requirements (mL/day)          Nutrition Prescription Ordered                Nutrition Prescription PO    Current PO Diet  Consistent Carb with NTL     Supplement  -- -     Supplement Frequency  -- -        Nutrition Prescription EN    Enteral Route  -- -     Product  -- -     TF Delivery Method  -----     Continuous TF Goal Rate (mL/hr)  -- -     Water flush (mL)   -- -     Water Flush Frequency  -- -        Nutrition Prescription PN    PN Route  -- -     PN Goal Volume (mL)  -- -     Dextrose (Kcal)  -- -     Amino Acid (gm)  -- -     Lipid Concentration (%)  -- -     Lipid Volume (mL)  -- -     Lipid Frequency  -- -         Evaluation of Received Nutrient/Fluid Intake                PO Evaluation    % PO Intake 71% x 6 meals recorded     RN confirrms pt is eating really well.         EN Evaluation    TF Changes  -- -     TF Residual  -- -     TF Tolerance  -- -        PN Evaluation    Number of Days PN Evaluated  -- -           Problem/Interventions:  Problem 1                Nutrition Diagnoses Problem 1    Problem 1 No nutrition dxs at this time                Intervention Goal                Intervention Goal    General Pt will cont to eat >70% of meals          Nutrition Intervention                Nutrition  Intervention    RD/Tech Action Continue to monitor          Nutrition Prescription     Row Name           Nutrition Prescription PO    Supplement --     Supplement Frequency --        Nutrition Prescription EN    Enteral Prescription  -- -        Nutrition Prescription PN    Parenteral Prescription  -- -         Education/Evaluation                Monitor/Evaluation    Monitor  weights, intakes, labs, BM and skin                Electronically signed by:  Claudette Segovia RD  12/04/19 10:35 AM

## 2019-12-05 PROBLEM — F03.918 DEMENTIA WITH BEHAVIORAL DISTURBANCE (HCC): Status: ACTIVE | Noted: 2019-01-01

## 2019-12-05 NOTE — THERAPY TREATMENT NOTE
"Acute Care - Occupational Therapy Treatment Note   [facility]     Patient Name: Yuriy Bennett  : 1940  MRN: [id number]  Today's Date: 2019             Admit Date: 2019       ICD-10-CM ICD-9-CM   1. Pneumonia of right lower lobe due to infectious organism (CMS/HCC) J18.1 486   2. Delirium due to another medical condition F05 293.0   3. Alzheimer's dementia with behavioral disturbance, unspecified timing of dementia onset (CMS/HCC) G30.9 331.0    F02.81 294.11   4. Sepsis without acute organ dysfunction, due to unspecified organism (CMS/HCC) A41.9 038.9     995.91   5. Mass of left chest wall R22.2 786.6     Patient Active Problem List   Diagnosis   • Cystitis   • Alzheimer's dementia with behavioral disturbance (CMS/HCC)   • Uncontrolled type 2 diabetes mellitus with hyperglycemia (CMS/HCC)   • H/O partial seizures   • History of prostate cancer   • Hyperlipidemia   • Noncompliance with medication regimen   • Nephrolithiasis   • Proteinuria   • HCAP (healthcare-associated pneumonia)   • Primary hypertension   • Sepsis due to pneumonia (CMS/HCC)   • Cellulitis of right lower extremity   • Dementia without behavioral disturbance (CMS/HCC)   • Toxic metabolic encephalopathy     Past Medical History:   Diagnosis Date   • Alzheimer disease (CMS/HCC)    • Cancer (CMS/HCC)     prostate   • Diabetes mellitus (CMS/HCC)    • Hypertension      History reviewed. No pertinent surgical history.    Therapy Treatment    Rehabilitation Treatment Summary     Row Name 19 1505             Treatment Time/Intention    Document Type  therapy note (daily note)  -SR      Subjective Information  -- \"Did the dog bite someone?\"  -SR      Mode of Treatment  occupational therapy  -SR      Existing Precautions/Restrictions  fall  -SR      Recorded by [SR] Lillian Pack OT 19 1622      Row Name 19 6682             Cognitive Assessment/Intervention- PT/OT    Orientation Status (Cognition)  oriented " "to;person;disoriented to;place;situation;time Unable to provide birthday.   -SR      Recorded by [SR] Lillian Pack, OT 12/05/19 1623      Row Name 12/05/19 1505             Safety Issues, Functional Mobility    Impairments Affecting Function (Mobility)  cognition;balance  -SR      Recorded by [SR] Lillian Pack, OT 12/05/19 1623      Row Name 12/05/19 1505             Bed Mobility Assessment/Treatment    Bed Mobility Assessment/Treatment  supine-sit;sit-supine  -SR      Supine-Sit [place] (Bed Mobility)  moderate assist (50% patient effort)  -SR      Sit-Supine [place] (Bed Mobility)  moderate assist (50% patient effort)  -SR      Recorded by [SR] Lillian Pack, OT 12/05/19 1623      Row Name 12/05/19 1505             Functional Mobility    Functional Mobility- Ind. Level  2 person assist required;moderate assist (50% patient effort)  -SR      Functional Mobility- Comment  Pt reuqired tactile assist for direction.  Pt very confused.  Required bilateral HHA.   -SR      Recorded by [SR] Lillian Pack, OT 12/05/19 1623      Row Name 12/05/19 1505             Transfer Assessment/Treatment    Transfer Assessment/Treatment  sit-stand transfer  -SR      Recorded by [SR] Lillian Pack, OT 12/05/19 1623      Row Name 12/05/19 1505             Sit-Stand Transfer    Sit-Stand [place] (Transfers)  minimum assist (75% patient effort)  -SR      Recorded by [SR] Lillian Pack, OT 12/05/19 1623      Row Name 12/05/19 1505             ADL Assessment/Intervention    BADL Assessment/Intervention  bathing  -SR      Recorded by [SR] Lillian Pack, OT 12/05/19 1623      Row Name 12/05/19 1505             Bathing Assessment/Intervention    Bathing [place] Level  bathing skills;minimum assist (75% patient effort)  -SR      Comment (Bathing)  Pt states multiple times, \"I need to get cleaned up.\" Pt requires max cues and redirection throughout bathing " activity.  Perseverates on worrying if someone was bit by his dog.  He was reassured that no one was bit by the dog and pt calmed down.  He asked about this multiple times.   -SR      Recorded by [SR] Lillian Pack, OT 12/05/19 1623      Row Name 12/05/19 1505             Lower Body Dressing Assessment/Training    Lower Body Dressing [place] Level  minimum assist (75% patient effort);don;socks  -SR      Recorded by [SR] Lillian Pack, OT 12/05/19 1623      Row Name 12/05/19 1505             Grooming Assessment/Training    [place] Level (Grooming)  supervision;set up;hair care, combing/brushing  -SR      Recorded by [SR] Lillian Pack, OT 12/05/19 1623      Row Name 12/05/19 1505             Toileting Assessment/Training    [place] Level (Toileting)  dependent (less than 25% patient effort)  -SR      Recorded by [SR] Lillian Pack, OT 12/05/19 1623      Row Name 12/05/19 1505             Static Sitting Balance    Level of [place] (Unsupported Sitting, Static Balance)  supervision  -SR      Recorded by [SR] Lillian Pack, OT 12/05/19 1623      Row Name 12/05/19 1505             Static Standing Balance    Level of [place] (Supported Standing, Static Balance)  moderate assist, 50 to 74% patient effort  -SR      Recorded by [SR] Lillian Pack, OT 12/05/19 1623      Row Name 12/05/19 1505             Positioning and Restraints    Pre-Treatment Position  in bed  -SR      Post Treatment Position  bed  -SR      In Bed  call light within reach;encouraged to call for assist;exit alarm on  -SR      Recorded by [SR] Lillian Pack, OT 12/05/19 1623      Row Name 12/05/19 1505             Pain Scale: FACES Pre/Post-Treatment    Pain: FACES Scale, Pretreatment  0-->no hurt  -SR      Pain: FACES Scale, Post-Treatment  0-->no hurt  -SR      Recorded by [SR] Lillian Pack, OT 12/05/19 1623        User Key  (r) = Recorded By, (t) = Taken  By, (c) = Cosigned By    Initials Name Effective Dates Discipline    SR Lillian Pack OT 03/01/19 -  OT             Occupational Therapy Education     Title: PT OT SLP Therapies (In Progress)     Topic: Occupational Therapy (In Progress)     Point: ADL training (In Progress)     Description: Instruct learner(s) on proper safety adaptation and remediation techniques during self care or transfers.   Instruct in proper use of assistive devices.    Learning Progress Summary           Patient Acceptance, E,TB, NR,NL by SR at 12/5/2019  4:26 PM    Acceptance, E,TB, VU by SR at 12/3/2019  1:49 PM                               User Key     Initials Effective Dates Name Provider Type Discipline    SR 03/01/19 -  Lillian Pack OT Occupational Therapist OT                OT Recommendation and Plan  Planned Therapy Interventions (OT Eval): activity tolerance training, BADL retraining, cognitive/visual perception retraining, functional balance retraining, occupation/activity based interventions, strengthening exercise, transfer/mobility retraining  Plan of Care Review  Plan of Care Reviewed With: patient  Plan of Care Reviewed With: patient  Outcome Summary: Pt very confused and delirius this date.  He required redirection and cues throughout treatment.  Participated in bathing activity with encouragement and reassurance.  He required increased assist for mobility this date.  He will require IP rehab at discharge.        Time Calculation:   Time Calculation- OT     Row Name 12/05/19 1626             Time Calculation- OT    OT Start Time  1505  -SR      OT Stop Time  1530  -SR      OT Time Calculation (min)  25 min  -SR      Total Timed Code Minutes- OT  25 minute(s)  -SR      OT Received On  12/05/19  -SR      OT - Next Appointment  12/09/19  -SR        User Key  (r) = Recorded By, (t) = Taken By, (c) = Cosigned By    Initials Name Provider Type    SR Lillian Pack, OT Occupational Therapist         Therapy Charges for Today     Code Description Service Date Service Provider Modifiers Qty    80583997044  OT THERAPEUTIC ACT EA 15 MIN 12/5/2019 Lillian Pack, OT GO 1    [id number]  OT SELF CARE/MGMT/TRAIN EA 15 MIN 12/5/2019 Lillian Pack, OT GO 1               Lillian Pack OT  12/5/2019

## 2019-12-05 NOTE — PLAN OF CARE
Problem: Fall Risk (Adult)  Goal: Identify Related Risk Factors and Signs and Symptoms  Outcome: Ongoing (interventions implemented as appropriate)    Goal: Absence of Fall  Outcome: Ongoing (interventions implemented as appropriate)      Problem: Patient Care Overview  Goal: Plan of Care Review  Outcome: Ongoing (interventions implemented as appropriate)   12/05/19 0259   Coping/Psychosocial   Plan of Care Reviewed With patient   Plan of Care Review   Progress no change   OTHER   Outcome Summary Pt had several episoded of aggression during the night. Pt became combative at one point. Multiple PRN meds given. Awaiting wife's decision on new facility. No distress noted. Will continue to monitor.        Problem: Skin Injury Risk (Adult)  Goal: Identify Related Risk Factors and Signs and Symptoms  Outcome: Ongoing (interventions implemented as appropriate)    Goal: Skin Health and Integrity  Outcome: Ongoing (interventions implemented as appropriate)

## 2019-12-05 NOTE — PROGRESS NOTES
Continued Stay Note   [facility]     Patient Name: Yuriy Bennett  MRN: [id number]  Today's Date: 12/5/2019    Admit Date: 12/1/2019    Discharge Plan     Row Name 12/05/19 1647       Plan    Plan  DC Plan: pending plan for psych unit or rehab (see comments). [facility] approved, if needed. Will require precert, for rehab. Needs to continue to be sitter free/restraint free at this time.     Patient/Family in Agreement with Plan  yes    Plan Comments  SW called spouse, Dorys ([phone]), regarding denial from [facility] liaison d/t increased aggression last night. Pt continues to have aggression & psych is following PRN. SW had RN re-request pscyh follow up with patient on 12/6 d/t potential need for inpt geriatric psych need. Spouse informed SW she is agreeable to referral for [facility] pscyh unit if needed. SW called [facility] psych unit ([phone]) to inform of potential referral pending psych seeing pt again on 12/6. MD updated.      SAILAJA Ramos    Phone: [phone]  Cell: [phone]  Fax: [phone]  [email or web address]

## 2019-12-05 NOTE — PROGRESS NOTES
[facility] Medicine Services Daily Progress Note      Hospitalist Team  LOS 4 days      Patient Care Team:  Carolina Tran FNP as PCP - General (Family Medicine)    Patient Location: 2124/1      Subjective   Subjective     Chief Complaint / Subjective  Chief Complaint   Patient presents with   • Altered Mental Status       HPI limited with confusion and dementia. No family in room. Reports that last night he was more agitated and needed more meds to help keep him calm    Brief Synopsis of Hospital Course/HPI  79-year-old male sent in from a mini home after he was noted to have altered mental status after lunch.  The patient apparently had been evaluated 10 days ago for worsening confusion and delirium but had a negative work-up at that time.  His blood sugar was reportedly normal.  There is no documented elevation of fever or chills although the patient's significant other noticed that he felt warm.  The patient apparently has recently been started on valproic acid for dementia features and no recent levels were reported.  Patient also apparently receives lorazepam for generalized anxiety     History from the wife.  She was generally unsatisfied with care at the psych inpatient facility.  Unknown why he was admitted over there.  Prior to that he was in nursing home and before that he was at [facility].  She states that he has not been home in about 2 months.  Mostly bedbound.  Needs plenty of help.  Known history of prostate cancer in the past with seed implantation.          Review of Systems   Unable to perform ROS: dementia         Objective   Objective      Vital Signs  Temp:  [97.3 °F (36.3 °C)-98.6 °F (37 °C)] 97.3 °F (36.3 °C)  Heart Rate:  [] 72  Resp:  [12-20] 12  BP: (138-159)/(74-96) 148/75  Oxygen Therapy  SpO2: 92 %  Pulse Oximetry Type: Continuous  Device (Oxygen Therapy): room air  Flow (L/min): 3  Flowsheet Rows      First Filed Value   Admission Height  182.9 cm  "(72\") Documented at 12/01/2019 1411   Admission Weight  79.4 kg (175 lb) Documented at 12/01/2019 1411        Intake & Output (last 3 days)       12/02 0701 - 12/03 0700 12/03 0701 - 12/04 0700 12/04 0701 - 12/05 0700 12/05 0701 - 12/06 0700    P.O. 360 480 960 840    IV Piggyback   100     Total Intake(mL/kg) 360 (5.7) 480 (7.5) 1060 (17) 840 (13.5)    Urine (mL/kg/hr)  200 (0.1)      Stool  0      Total Output  200      Net +360 +280 +1060 +840            Urine Unmeasured Occurrence 1 x 1 x 6 x     Stool Unmeasured Occurrence  1 x 1 x         Lines, Drains & Airways    Active LDAs     None                  Physical Exam:    Physical Exam   Constitutional: No distress.   HENT:   Head: Normocephalic.   Eyes: Pupils are equal, round, and reactive to light.   Pulmonary/Chest: Effort normal.   Diminished BS at the bases   Abdominal: Soft.   Neurological: He is alert.   Oriented x1   Psychiatric:   Flat affect          Results Review:     I reviewed the patient's new clinical results.      Lab Results (last 24 hours)     Procedure Component Value Units Date/Time    POC Glucose Once [[id number]]  (Abnormal) Collected:  12/05/19 1638    Specimen:  Blood Updated:  12/05/19 1639     Glucose 286 mg/dL      Comment: Serial Number: [id number]:  [id number]       POC Glucose Once [[id number]]  (Abnormal) Collected:  12/05/19 1503    Specimen:  Blood Updated:  12/05/19 1504     Glucose 218 mg/dL      Comment: Serial Number: [id number]:  [id number]       Blood Culture - Blood, Arm, Right [[id number]] Collected:  12/01/19 1449    Specimen:  Blood from Arm, Right Updated:  12/05/19 1500     Blood Culture No growth at 4 days    Blood Culture - Blood, Arm, Left [[id number]] Collected:  12/01/19 1436    Specimen:  Blood from Arm, Left Updated:  12/05/19 1446     Blood Culture No growth at 4 days    POC Glucose Once [[id number]]  (Normal) Collected:  12/05/19 1249    Specimen:  Blood Updated:  12/05/19 1253     Glucose 99 mg/dL  "     Comment: Serial Number: [id number]:  [id number]       POC Glucose Once [[id number]]  (Abnormal) Collected:  12/05/19 1235    Specimen:  Blood Updated:  12/05/19 1248     Glucose 32 mg/dL      Comment: Serial Number: [id number]:  [id number]       POC Glucose Once [[id number]]  (Abnormal) Collected:  12/05/19 1223    Specimen:  Blood Updated:  12/05/19 1235     Glucose 32 mg/dL      Comment: Serial Number: [id number]:  [id number]       POC Glucose Once [[id number]]  (Abnormal) Collected:  12/05/19 1211    Specimen:  Blood Updated:  12/05/19 1235     Glucose 41 mg/dL      Comment: Serial Number: [id number]:  [id number]       POC Glucose Once [[id number]]  (Abnormal) Collected:  12/05/19 1155    Specimen:  Blood Updated:  12/05/19 1208     Glucose 45 mg/dL      Comment: Serial Number: [id number]:  [id number]       POC Glucose Once [[id number]]  (Abnormal) Collected:  12/05/19 1135    Specimen:  Blood Updated:  12/05/19 1149     Glucose 42 mg/dL      Comment: Serial Number: [id number]:  [id number]       POC Glucose Once [[id number]]  (Abnormal) Collected:  12/05/19 0725    Specimen:  Blood Updated:  12/05/19 0729     Glucose 128 mg/dL      Comment: Serial Number: [id number]:  [id number]       Basic Metabolic Panel [[id number]]  (Abnormal) Collected:  12/05/19 0507    Specimen:  Blood Updated:  12/05/19 0613     Glucose 150 mg/dL      BUN 11 mg/dL      Creatinine 0.62 mg/dL      Sodium 139 mmol/L      Potassium 3.9 mmol/L      Chloride 101 mmol/L      CO2 29.0 mmol/L      Calcium 8.5 mg/dL      eGFR Non African Amer 125 mL/min/1.73      BUN/Creatinine Ratio 17.7     Anion Gap 9.0 mmol/L     Narrative:       GFR Normal >60  Chronic Kidney Disease <60  Kidney Failure <15    CBC & Differential [[id number]] Collected:  12/05/19 0507    Specimen:  Blood Updated:  12/05/19 0539    Narrative:       The following orders were created for panel order CBC & Differential.  Procedure                                Abnormality         Status                     ---------                               -----------         ------                     CBC Auto Differential[[id number]]        Abnormal            Final result                 Please view results for these tests on the individual orders.    CBC Auto Differential [[id number]]  (Abnormal) Collected:  12/05/19 0507    Specimen:  Blood Updated:  12/05/19 0539     WBC 5.90 10*3/mm3      RBC 3.55 10*6/mm3      Hemoglobin 11.1 g/dL      Hematocrit 33.0 %      MCV 92.8 fL      MCH 31.3 pg      MCHC 33.7 g/dL      RDW 15.5 %      RDW-SD 51.2 fl      MPV 6.8 fL      Platelets 343 10*3/mm3      Neutrophil % 55.1 %      Lymphocyte % 29.5 %      Monocyte % 13.3 %      Eosinophil % 1.4 %      Basophil % 0.7 %      Neutrophils, Absolute 3.20 10*3/mm3      Lymphocytes, Absolute 1.70 10*3/mm3      Monocytes, Absolute 0.80 10*3/mm3      Eosinophils, Absolute 0.10 10*3/mm3      Basophils, Absolute 0.00 10*3/mm3      nRBC 0.1 /100 WBC     POC Glucose Once [[id number]]  (Abnormal) Collected:  12/05/19 0256    Specimen:  Blood Updated:  12/05/19 0257     Glucose 178 mg/dL      Comment: Serial Number: [id number]:  [id number]       POC Glucose Once [[id number]]  (Abnormal) Collected:  12/04/19 1948    Specimen:  Blood Updated:  12/04/19 1950     Glucose 284 mg/dL      Comment: Serial Number: [id number]:  [id number]           No results found for: HGBA1C                Microbiology Results (last 10 days)     Procedure Component Value - Date/Time    Respiratory Panel, PCR - Swab, Nasopharynx [[id number]]  (Abnormal) Collected:  12/01/19 2029    Lab Status:  Final result Specimen:  Swab from Nasopharynx Updated:  12/01/19 2154     ADENOVIRUS, PCR Not Detected     Coronavirus 229E Not Detected     Coronavirus HKU1 Not Detected     Coronavirus NL63 Not Detected     Coronavirus OC43 Not Detected     Human Metapneumovirus Detected     Human Rhinovirus/Enterovirus Not Detected      Influenza B PCR Not Detected     Parainfluenza Virus 1 Not Detected     Parainfluenza Virus 2 Not Detected     Parainfluenza Virus 3 Not Detected     Parainfluenza Virus 4 Not Detected     Bordetella pertussis pcr Not Detected     Influenza A H1 2009 PCR Not Detected     Chlamydophila pneumoniae PCR Not Detected     Mycoplasma pneumo by PCR Not Detected     Influenza A PCR Not Detected     Influenza A H3 Not Detected     Influenza A H1 Not Detected     RSV, PCR Not Detected    Urine Culture - Urine, Urine, Catheter [[id number]]  (Abnormal) Collected:  12/01/19 1702    Lab Status:  Final result Specimen:  Urine, Catheter Updated:  12/02/19 1328     Urine Culture Yeast isolated     Comment: No further work up.       Blood Culture - Blood, Arm, Right [[id number]] Collected:  12/01/19 1449    Lab Status:  Preliminary result Specimen:  Blood from Arm, Right Updated:  12/05/19 1500     Blood Culture No growth at 4 days    Blood Culture - Blood, Arm, Left [[id number]] Collected:  12/01/19 1436    Lab Status:  Preliminary result Specimen:  Blood from Arm, Left Updated:  12/05/19 1446     Blood Culture No growth at 4 days          ECG/EMG Results (most recent)     None                    Xr Chest 1 View    Result Date: 12/2/2019   1. Moderate interval worsening from December 1 2. Bilateral perihilar pneumonia is mild to moderate in degree along with change suggesting low-grade failure  Electronically Signed By-Yunier Coronel Jr. On:12/2/2019 7:24 AM This report was finalized on [id number] by  Yunier Coronel Jr., .    Xr Chest 1 View    Result Date: 12/1/2019  1. 2 cm nodular opacity in the left upper chest is most likely due to calcified cartilage at the left first rib and. Recommend PA and lateral when possible to confirm. 2. Mild left basal infiltrate versus atelectasis.  Electronically Signed By-Joelle Luna On:12/1/2019 2:56 PM This report was finalized on [id number] by  Joelle Luna, .      Xrays, labs reviewed  personally by physician.    Medication Review:   I have reviewed the patient's current medication list      Scheduled Meds    amoxicillin-clavulanate 1 tablet Oral Q12H   divalproex 250 mg Oral TID   famotidine 40 mg Oral BID   folic acid 1 mg Oral Daily   heparin (porcine) 5,000 Units Subcutaneous Q12H   [START ON 12/6/2019] insulin glargine 10 Units Subcutaneous QAM   insulin lispro 0-9 Units Subcutaneous 4x Daily With Meals & Nightly   LORazepam 1 mg Oral Nightly   metoprolol tartrate 12.5 mg Oral BID   sodium chloride 10 mL Intravenous Q12H   thiamine 100 mg Oral Daily       Meds Infusions       Meds PRN  •  acetaminophen **OR** acetaminophen **OR** acetaminophen  •  acetaminophen  •  bisacodyl  •  dextrose  •  dextrose  •  glucagon (human recombinant)  •  haloperidol lactate  •  insulin lispro **AND** insulin lispro  •  lactated ringers  •  polyethylene glycol  •  prochlorperazine  •  QUEtiapine  •  sodium chloride  •  sodium chloride    I personally reviewed patient's EKG    Assessment/Plan   Assessment/Plan     Active Hospital Problems:  * Sepsis due to pneumonia (CMS/HCC)- (present on admission)    Resolving  Cx negative thus far      Toxic metabolic encephalopathy    D/t PNA and sepsis  tx underlying causes       HCAP (healthcare-associated pneumonia)- (present on admission)    Change to po Augmentin for course   Supportive treatment  Cx negative thus far      Uncontrolled type 2 diabetes mellitus with hyperglycemia (CMS/HCC)- (present on admission)    BG still dropped despite lower insulin  Stop  pre-meal  And lower basal  SSI and follow BG      Dementia with behavioral disturbance (CMS/HCC)    Psych to be re-consulted   Continue medication regimen  Seems good inpt psych candidate      Cellulitis of right lower extremity- (present on admission)    Improving abx as above     Primary hypertension- (present on admission)    Continue home medications                 VTE Prophylaxis - heparin.      Code Status  -   Code Status and Medical Interventions:   Ordered at: 12/01/19 1925     Code Status:    CPR     Medical Interventions (Level of Support Prior to Arrest):    Full       Discharge Planning    Was from Inpt behavioral health unit, rehab has refused now trying inpt psych unit    Electronically signed by Gera Sandhu DO, 12/05/19, 5:05 PM.  [facility] Hospitalist Team

## 2019-12-05 NOTE — PLAN OF CARE
Problem: Patient Care Overview  Goal: Plan of Care Review  Outcome: Ongoing (interventions implemented as appropriate)   12/05/19 4186   Coping/Psychosocial   Plan of Care Reviewed With patient   OTHER   Outcome Summary Pt very confused and delirius this date. He required redirection and cues throughout treatment. Participated in bathing activity with encouragement and reassurance. He required increased assist for mobility this date. He will require IP rehab at discharge.

## 2019-12-06 NOTE — PLAN OF CARE
Problem: Patient Care Overview  Goal: Plan of Care Review  Outcome: Ongoing (interventions implemented as appropriate)   12/06/19 4278   Coping/Psychosocial   Plan of Care Reviewed With patient   OTHER   Outcome Summary Pt continues to demonstrate significant confusion and impulsivity. He requires max verbal and tactile cues for activity. Assist to progress walker to get to bathroom. He will require IP rehab at discharge.

## 2019-12-06 NOTE — THERAPY TREATMENT NOTE
Acute Care - Occupational Therapy Treatment Note   [facility]     Patient Name: Yuriy Bennett  : 1940  MRN: [id number]  Today's Date: 2019             Admit Date: 2019       ICD-10-CM ICD-9-CM   1. Pneumonia of right lower lobe due to infectious organism (CMS/HCC) J18.1 486   2. Delirium due to another medical condition F05 293.0   3. Alzheimer's dementia with behavioral disturbance, unspecified timing of dementia onset (CMS/HCC) G30.9 331.0    F02.81 294.11   4. Sepsis without acute organ dysfunction, due to unspecified organism (CMS/HCC) A41.9 038.9     995.91   5. Mass of left chest wall R22.2 786.6     Patient Active Problem List   Diagnosis   • Cystitis   • Alzheimer's dementia with behavioral disturbance (CMS/HCC)   • Uncontrolled type 2 diabetes mellitus with hyperglycemia (CMS/HCC)   • H/O partial seizures   • History of prostate cancer   • Hyperlipidemia   • Noncompliance with medication regimen   • Nephrolithiasis   • Proteinuria   • HCAP (healthcare-associated pneumonia)   • Primary hypertension   • Sepsis due to pneumonia (CMS/HCC)   • Cellulitis of right lower extremity   • Dementia with behavioral disturbance (CMS/HCC)   • Toxic metabolic encephalopathy     Past Medical History:   Diagnosis Date   • Alzheimer disease (CMS/HCC)    • Cancer (CMS/HCC)     prostate   • Diabetes mellitus (CMS/HCC)    • Hypertension      History reviewed. No pertinent surgical history.    Therapy Treatment    Rehabilitation Treatment Summary     Row Name 19 1244             Treatment Time/Intention    Discipline  occupational therapist  -SR      Document Type  therapy note (daily note)  -SR      Subjective Information  no complaints  -SR      Patient/Family Observations  Wife present. Pt has dropped food all over bed.   -SR      Recorded by [SR] Lillian Pack OT 19 1325      Row Name 19 1300             Cognitive Assessment Intervention- SLP    Cognitive Function (Cognition)   severe impairment  -SR      Orientation Status (Cognition)  severe impairment  -SR      Recorded by [SR] Lillian Pack, OT 12/06/19 1325      Row Name 12/06/19 1300             Safety Issues, Functional Mobility    Safety Issues Affecting Function (Mobility)  ability to follow commands;impulsivity  -SR      Recorded by [SR] Lillian Pack, OT 12/06/19 1325      Row Name 12/06/19 1300             Bed Mobility Assessment/Treatment    Bed Mobility Assessment/Treatment  supine-sit;sit-supine  -SR      Supine-Sit [place] (Bed Mobility)  minimum assist (75% patient effort)  -SR      Sit-Supine [place] (Bed Mobility)  maximum assist (25% patient effort)  -SR      Comment (Bed Mobility)  Difficulty following commands.   -SR      Recorded by [SR] Lillian Pack, OT 12/06/19 1325      Row Name 12/06/19 1300             Functional Mobility    Functional Mobility- Ind. Level  moderate assist (50% patient effort)  -SR      Functional Mobility- Device  rolling walker  -SR      Functional Mobility- Comment  Requires assist from therapist to move rwx in order to progress.   -SR      Recorded by [SR] Lillian Pack OT 12/06/19 1325      Row Name 12/06/19 1300             Transfer Assessment/Treatment    Transfer Assessment/Treatment  toilet transfer  -SR      Recorded by [SR] Lillian Pack OT 12/06/19 1325      Row Name 12/06/19 1300             Sit-Stand Transfer    Sit-Stand [place] (Transfers)  minimum assist (75% patient effort)  -SR      Recorded by [SR] Lillian Pack OT 12/06/19 1325      Row Name 12/06/19 1300             Toilet Transfer    Type (Toilet Transfer)  sit-stand  -SR      [place] Level (Toilet Transfer)  minimum assist (75% patient effort)  -SR      Assistive Device (Toilet Transfer)  grab bars/safety frame;walker, front-wheeled  -SR      Recorded by [SR] Lillian Pack OT 12/06/19 1325      Row Name 12/06/19 1300             Static  Sitting Balance    Level of [place] (Unsupported Sitting, Static Balance)  supervision  -SR      Recorded by [SR] Lillian Pack, OT 12/06/19 1325      Row Name 12/06/19 1300             Static Standing Balance    Level of [place] (Supported Standing, Static Balance)  moderate assist, 50 to 74% patient effort  -SR      Recorded by [SR] Lillian Pack, OT 12/06/19 1325      Row Name 12/06/19 1300             Dynamic Standing Balance    Level of [place], Reaches Outside Midline (Standing, Dynamic Balance)  moderate assist, 50 to 74% patient effort  -SR      Recorded by [SR] Lillian Pack, OT 12/06/19 1325      Row Name 12/06/19 1300             Pain Scale: FACES Pre/Post-Treatment    Pain: FACES Scale, Pretreatment  0-->no hurt  -SR      Pain: FACES Scale, Post-Treatment  0-->no hurt  -SR      Recorded by [SR] Lillian Pack, OT 12/06/19 1325        User Key  (r) = Recorded By, (t) = Taken By, (c) = Cosigned By    Initials Name Effective Dates Discipline    SR Lillian Pack, OT 03/01/19 -  OT             Occupational Therapy Education     Title: PT OT SLP Therapies (In Progress)     Topic: Occupational Therapy (In Progress)     Point: ADL training (Done)     Description: Instruct learner(s) on proper safety adaptation and remediation techniques during self care or transfers.   Instruct in proper use of assistive devices.    Learning Progress Summary           Patient Acceptance, E,TB, VU by SR at 12/6/2019  1:27 PM    Acceptance, E,TB, NR,NL by SR at 12/5/2019  4:26 PM    Acceptance, E,TB, VU by SR at 12/3/2019  1:49 PM                   Point: Body mechanics (Done)     Description: Instruct learner(s) on proper positioning and spine alignment during self-care, functional mobility activities and/or exercises.    Learning Progress Summary           Patient Acceptance, E,TB, VU by SR at 12/6/2019  1:27 PM                               User Key     Initials  Effective Dates Name Provider Type Discipline    SR 03/01/19 -  Lillian Pack OT Occupational Therapist OT                OT Recommendation and Plan  Planned Therapy Interventions (OT Eval): activity tolerance training, BADL retraining, cognitive/visual perception retraining, functional balance retraining, occupation/activity based interventions, strengthening exercise, transfer/mobility retraining  Plan of Care Review  Plan of Care Reviewed With: patient  Plan of Care Reviewed With: patient  Outcome Summary: Pt continues to demonstrate significant confusion and impulsivity.  He requires max verbal and tactile cues for activity. Assist to progress walker to get to bathroom.  He will require IP rehab at discharge.        Time Calculation:   Time Calculation- OT     Row Name 12/06/19 1327             Time Calculation- OT    OT Start Time  1244  -SR      OT Stop Time  1305  -SR      OT Time Calculation (min)  21 min  -SR      Total Timed Code Minutes- OT  21 minute(s)  -SR      OT Received On  12/06/19  -SR      OT - Next Appointment  12/09/19  -SR        User Key  (r) = Recorded By, (t) = Taken By, (c) = Cosigned By    Initials Name Provider Type    SR Lillian Pack OT Occupational Therapist        Therapy Charges for Today     Code Description Service Date Service Provider Modifiers Qty    87018484901 HC OT THERAPEUTIC ACT EA 15 MIN 12/5/2019 Lillian Pack OT GO 1    34984940297 HC OT SELF CARE/MGMT/TRAIN EA 15 MIN 12/5/2019 Lillian Pack OT GO 1    95474623724 HC OT SELF CARE/MGMT/TRAIN EA 15 MIN 12/6/2019 Lillian Pack OT GO 1    26454718581 HC OT THERAPEUTIC ACT EA 15 MIN 12/6/2019 Lillian Pack OT GO 1               Lillian Pack OT  12/6/2019

## 2019-12-06 NOTE — PROGRESS NOTES
Continued Stay Note   [facility]     Patient Name: Yuriy Bennett  MRN: [id number]  Today's Date: 12/6/2019    Admit Date: 12/1/2019    Discharge Plan     seeking placement, no acceptance currently.  Noted referral to [facility], [place], operated by [facility] unit    Chayo Moran RN

## 2019-12-06 NOTE — PLAN OF CARE
Problem: Fall Risk (Adult)  Goal: Identify Related Risk Factors and Signs and Symptoms  Outcome: Ongoing (interventions implemented as appropriate)    Goal: Absence of Fall  Outcome: Ongoing (interventions implemented as appropriate)      Problem: Patient Care Overview  Goal: Plan of Care Review  Outcome: Ongoing (interventions implemented as appropriate)   12/06/19 0215   Coping/Psychosocial   Plan of Care Reviewed With patient   Plan of Care Review   Progress no change   OTHER   Outcome Summary Pt still very confused and delirius. Frequently pulling at his IV and heart monitor. However, he has not been combative up to this point. No distress noted. Will continue to monitor.        Problem: Skin Injury Risk (Adult)  Goal: Identify Related Risk Factors and Signs and Symptoms  Outcome: Ongoing (interventions implemented as appropriate)    Goal: Skin Health and Integrity  Outcome: Ongoing (interventions implemented as appropriate)

## 2019-12-06 NOTE — PROGRESS NOTES
"      [facility] Services Daily Progress Note      Hospitalist Team  LOS 5 days      Patient Care Team:  Carolina Tran FNP as PCP - General (Family Medicine)    Patient Location: 232/1      Subjective   Subjective     Chief Complaint / Subjective  Chief Complaint   Patient presents with   • Altered Mental Status       HPI limited with confusion and dementia. No family in room again today. No reports of increased agitation last night     Brief Synopsis of Hospital Course/HPI  79-year-old male sent in from a mini home after he was noted to have altered mental status after lunch.  The patient apparently had been evaluated 10 days ago for worsening confusion and delirium but had a negative work-up at that time.  His blood sugar was reportedly normal.  There is no documented elevation of fever or chills although the patient's significant other noticed that he felt warm.  The patient apparently has recently been started on valproic acid for dementia features and no recent levels were reported.  Patient also apparently receives lorazepam for generalized anxiety     History from the wife.  She was generally unsatisfied with care at the psych inpatient facility.  Unknown why he was admitted over there.  Prior to that he was in nursing home and before that he was at [facility].  She states that he has not been home in about 2 months.  Mostly bedbound.  Needs plenty of help.  Known history of prostate cancer in the past with seed implantation.          Review of Systems   Unable to perform ROS: dementia         Objective   Objective      Vital Signs  Temp:  [97.3 °F (36.3 °C)-98.5 °F (36.9 °C)] 97.5 °F (36.4 °C)  Heart Rate:  [56-89] 56  Resp:  [12-20] 16  BP: (111-176)/() 176/95  Oxygen Therapy  SpO2: 97 %  Pulse Oximetry Type: Intermittent  Device (Oxygen Therapy): room air  Flow (L/min): 3  Flowsheet Rows      First Filed Value   Admission Height  182.9 cm (72\") Documented at " 12/01/2019 1411   Admission Weight  79.4 kg (175 lb) Documented at 12/01/2019 1411        Intake & Output (last 3 days)       12/03 0701 - 12/04 0700 12/04 0701 - 12/05 0700 12/05 0701 - 12/06 0700 12/06 0701 - 12/07 0700    P.O.      I.V. (mL/kg)   797 (13)     IV Piggyback  100      Total Intake(mL/kg) 480 (7.5) 1060 (17) 2117 (34.5)     Urine (mL/kg/hr) 200 (0.1)       Stool 0       Total Output 200       Net +280 +1060 +2117             Urine Unmeasured Occurrence 1 x 6 x 5 x 1 x    Stool Unmeasured Occurrence 1 x 1 x          Lines, Drains & Airways    Active LDAs     None                  Physical Exam:    Physical Exam   Constitutional: No distress.   HENT:   Head: Normocephalic.   Eyes: Pupils are equal, round, and reactive to light.   Pulmonary/Chest: Effort normal.   Diminished BS at the bases   Abdominal: Soft.   Neurological: He is alert.   Oriented x1   Psychiatric:   Flat affect, confused but follows some commands         Results Review:     I reviewed the patient's new clinical results.      Lab Results (last 24 hours)     Procedure Component Value Units Date/Time    POC Glucose Once [[id number]]  (Normal) Collected:  12/06/19 1131    Specimen:  Blood Updated:  12/06/19 1138     Glucose 93 mg/dL      Comment: Serial Number: [id number]:  [id number]       POC Glucose Once [[id number]]  (Abnormal) Collected:  12/06/19 0714    Specimen:  Blood Updated:  12/06/19 0718     Glucose 124 mg/dL      Comment: Serial Number: [id number]:  [id number]       Basic Metabolic Panel [[id number]]  (Abnormal) Collected:  12/06/19 0251    Specimen:  Blood Updated:  12/06/19 0449     Glucose 130 mg/dL      BUN 10 mg/dL      Creatinine 0.68 mg/dL      Sodium 141 mmol/L      Potassium 3.9 mmol/L      Chloride 101 mmol/L      CO2 30.0 mmol/L      Calcium 8.5 mg/dL      eGFR Non African Amer 112 mL/min/1.73      BUN/Creatinine Ratio 14.7     Anion Gap 10.0 mmol/L     Narrative:       GFR Normal >60  Chronic  Kidney Disease <60  Kidney Failure <15    CBC & Differential [[id number]] Collected:  12/06/19 0251    Specimen:  Blood Updated:  12/06/19 0422    Narrative:       The following orders were created for panel order CBC & Differential.  Procedure                               Abnormality         Status                     ---------                               -----------         ------                     CBC Auto Differential[[id number]]        Abnormal            Final result                 Please view results for these tests on the individual orders.    CBC Auto Differential [[id number]]  (Abnormal) Collected:  12/06/19 0251    Specimen:  Blood Updated:  12/06/19 0422     WBC 5.50 10*3/mm3      RBC 3.56 10*6/mm3      Hemoglobin 11.0 g/dL      Hematocrit 33.4 %      MCV 93.9 fL      MCH 30.8 pg      MCHC 32.9 g/dL      RDW 15.4 %      RDW-SD 50.8 fl      MPV 7.2 fL      Platelets 353 10*3/mm3      Neutrophil % 54.8 %      Lymphocyte % 30.9 %      Monocyte % 11.3 %      Eosinophil % 2.3 %      Basophil % 0.7 %      Neutrophils, Absolute 3.00 10*3/mm3      Lymphocytes, Absolute 1.70 10*3/mm3      Monocytes, Absolute 0.60 10*3/mm3      Eosinophils, Absolute 0.10 10*3/mm3      Basophils, Absolute 0.00 10*3/mm3      nRBC 0.2 /100 WBC     POC Glucose Once [[id number]]  (Abnormal) Collected:  12/05/19 2230    Specimen:  Blood Updated:  12/05/19 2236     Glucose 178 mg/dL      Comment: Serial Number: [id number]:  [id number]       POC Glucose Once [[id number]]  (Abnormal) Collected:  12/05/19 1638    Specimen:  Blood Updated:  12/05/19 1639     Glucose 286 mg/dL      Comment: Serial Number: [id number]:  [id number]       POC Glucose Once [[id number]]  (Abnormal) Collected:  12/05/19 1503    Specimen:  Blood Updated:  12/05/19 1504     Glucose 218 mg/dL      Comment: Serial Number: [id number]:  [id number]       Blood Culture - Blood, Arm, Right [[id number]] Collected:  12/01/19 1449    Specimen:  Blood from Arm,  Right Updated:  12/05/19 1500     Blood Culture No growth at 4 days    Blood Culture - Blood, Arm, Left [[id number]] Collected:  12/01/19 1436    Specimen:  Blood from Arm, Left Updated:  12/05/19 1446     Blood Culture No growth at 4 days        No results found for: HGBA1C                Microbiology Results (last 10 days)     Procedure Component Value - Date/Time    Respiratory Panel, PCR - Swab, Nasopharynx [[id number]]  (Abnormal) Collected:  12/01/19 2029    Lab Status:  Final result Specimen:  Swab from Nasopharynx Updated:  12/01/19 2154     ADENOVIRUS, PCR Not Detected     Coronavirus 229E Not Detected     Coronavirus HKU1 Not Detected     Coronavirus NL63 Not Detected     Coronavirus OC43 Not Detected     Human Metapneumovirus Detected     Human Rhinovirus/Enterovirus Not Detected     Influenza B PCR Not Detected     Parainfluenza Virus 1 Not Detected     Parainfluenza Virus 2 Not Detected     Parainfluenza Virus 3 Not Detected     Parainfluenza Virus 4 Not Detected     Bordetella pertussis pcr Not Detected     Influenza A H1 2009 PCR Not Detected     Chlamydophila pneumoniae PCR Not Detected     Mycoplasma pneumo by PCR Not Detected     Influenza A PCR Not Detected     Influenza A H3 Not Detected     Influenza A H1 Not Detected     RSV, PCR Not Detected    Urine Culture - Urine, Urine, Catheter [[id number]]  (Abnormal) Collected:  12/01/19 1702    Lab Status:  Final result Specimen:  Urine, Catheter Updated:  12/02/19 1328     Urine Culture Yeast isolated     Comment: No further work up.       Blood Culture - Blood, Arm, Right [[id number]] Collected:  12/01/19 1449    Lab Status:  Preliminary result Specimen:  Blood from Arm, Right Updated:  12/05/19 1500     Blood Culture No growth at 4 days    Blood Culture - Blood, Arm, Left [[id number]] Collected:  12/01/19 1436    Lab Status:  Preliminary result Specimen:  Blood from Arm, Left Updated:  12/05/19 1446     Blood Culture No growth at 4 days          ECG/EMG  Results (most recent)     None                    Xr Chest 1 View    Result Date: 12/2/2019   1. Moderate interval worsening from December 1 2. Bilateral perihilar pneumonia is mild to moderate in degree along with change suggesting low-grade failure  Electronically Signed By-Yunier Coronel Jr. On:12/2/2019 7:24 AM This report was finalized on [id number] by  Yunier Coronel Jr., .    Xr Chest 1 View    Result Date: 12/1/2019  1. 2 cm nodular opacity in the left upper chest is most likely due to calcified cartilage at the left first rib and. Recommend PA and lateral when possible to confirm. 2. Mild left basal infiltrate versus atelectasis.  Electronically Signed By-Joelle Luna On:12/1/2019 2:56 PM This report was finalized on [id number] by  Joelle Luna .      Xrays, labs reviewed personally by physician.    Medication Review:   I have reviewed the patient's current medication list      Scheduled Meds    amoxicillin-clavulanate 1 tablet Oral Q12H   divalproex 250 mg Oral TID   famotidine 40 mg Oral BID   folic acid 1 mg Oral Daily   heparin (porcine) 5,000 Units Subcutaneous Q12H   insulin glargine 8 Units Subcutaneous QAM   insulin lispro 0-9 Units Subcutaneous 4x Daily With Meals & Nightly   LORazepam 1 mg Oral Nightly   metoprolol tartrate 12.5 mg Oral BID   sodium chloride 10 mL Intravenous Q12H   thiamine 100 mg Oral Daily       Meds Infusions       Meds PRN  •  acetaminophen **OR** acetaminophen **OR** acetaminophen  •  acetaminophen  •  bisacodyl  •  dextrose  •  dextrose  •  glucagon (human recombinant)  •  haloperidol lactate  •  insulin lispro **AND** insulin lispro  •  lactated ringers  •  polyethylene glycol  •  prochlorperazine  •  QUEtiapine  •  sodium chloride  •  sodium chloride    I personally reviewed patient's EKG    Assessment/Plan   Assessment/Plan     Active Hospital Problems:  * Sepsis due to pneumonia (CMS/HCC)- (present on admission)    Resolved  Cx negative thus far      Toxic metabolic  encephalopathy    D/t PNA and sepsis  tx underlying causes       HCAP (healthcare-associated pneumonia)- (present on admission)    Continue Augmentin for course   Supportive treatment  Cx negative thus far      Uncontrolled type 2 diabetes mellitus with hyperglycemia (CMS/HCC)- (present on admission)    BG stable since yesterday   Stopped  pre-meal , continue lower dose basal  SSI and follow BG      Dementia with behavioral disturbance (CMS/HCC)    Psych to be re-consulted   Continue medication regimen  Seems good inpt psych candidate      Cellulitis of right lower extremity- (present on admission)    Improving abx as above     Primary hypertension- (present on admission)    Continue home medications                 VTE Prophylaxis - heparin.      Code Status -   Code Status and Medical Interventions:   Ordered at: 12/01/19 1925     Code Status:    CPR     Medical Interventions (Level of Support Prior to Arrest):    Full       Discharge Planning    Was from Inpt behavioral health unit, rehab placement unsuccessful now pending re-psych eval for possible inpatient psych.     Electronically signed by Gera Sandhu DO, 12/06/19, 1:45 PM.  [facility] Hospitalist Team

## 2019-12-06 NOTE — PROGRESS NOTES
Chief complaint the patient was sleeping, unable to express any complaints    Subjective .     History of present illness:  The patient is a 79 y.o. male who was admitted secondary to mental status  Changes.  Past medical history significant for Alzheimer's dementia, prostate cancer, diabetes.  Psychiatric consult was requested by  secondary to mental status changes.  The patient remains a very poor historian, patient's wife was at bedside and provided much of the history.  The patient was very irritable person even on his baseline, when health started declining, the patient was getting easily agitated, he gets used to specific conditions at home, getting more irritable and frustrated in the hospital.  The patient for few episodes of agitation while hospitalized prevented him from going to the rehab.  The patient is compliant with medications, when he does take medications he is calmer.  The patient did not make any threats  Past psychiatric history: Dementia      Review of Systems   Review of systems could not be obtained due to   patient confusion.    History       Medications Prior to Admission   Medication Sig Dispense Refill Last Dose   • divalproex (DEPAKOTE) 125 MG DR tablet Take 250 mg by mouth 3 (Three) Times a Day.   12/1/2019 at Unknown time   • famotidine (PEPCID) 20 MG tablet Take 40 mg by mouth 2 (Two) Times a Day.   12/1/2019 at Unknown time   • folic acid (FOLVITE) 1 MG tablet Take 1 mg by mouth Daily.   12/1/2019 at Unknown time   • haloperidol lactate (HALDOL) 5 MG/ML injection Inject 5 mg into the appropriate muscle as directed by prescriber Every 6 (Six) Hours As Needed for Agitation.   Past Week at Unknown time   • insulin glargine (LANTUS) 100 UNIT/ML injection Inject 15 Units under the skin into the appropriate area as directed Daily.      • insulin lispro (humaLOG) 100 UNIT/ML injection Inject  under the skin into the appropriate area as directed 4 (Four) Times a Day After Meals & at  Bedtime. Sliding scale   12/1/2019 at Unknown time   • insulin lispro (humaLOG) 100 UNIT/ML injection Inject 5 Units under the skin into the appropriate area as directed 3 (Three) Times a Day Before Meals.   12/1/2019 at Unknown time   • LORazepam (ATIVAN) 1 MG tablet Take 1 mg by mouth every night at bedtime.   11/30/2019 at Unknown time   • LORazepam (ATIVAN) 2 MG/ML injection Inject 1 mg into the appropriate muscle as directed by prescriber Every 6 (Six) Hours As Needed for Anxiety.   Past Week at Unknown time   • metoprolol tartrate (LOPRESSOR) 25 MG tablet Take 12.5 mg by mouth 2 (Two) Times a Day.   12/1/2019 at Unknown time   • QUEtiapine (SEROquel) 25 MG tablet Take 25 mg by mouth Every 4 (Four) Hours As Needed (for agitation).   Past Week at Unknown time   • temazepam (RESTORIL) 15 MG capsule Take 15 mg by mouth every night at bedtime.   11/30/2019 at Unknown time   • thiamine (VITAMIN B-1) 100 MG tablet Take 100 mg by mouth 3 (Three) Times a Day.   12/1/2019 at Unknown time   • ziprasidone (GEODON) 20 MG injection Inject 10 mg into the appropriate muscle as directed by prescriber Every 12 (Twelve) Hours As Needed for Agitation.   Past Week at Unknown time   • acetaminophen (TYLENOL) 325 MG tablet Take 650 mg by mouth Every 6 (Six) Hours As Needed for Mild Pain , Moderate Pain  or Fever.   Unknown at Unknown time   • loperamide (IMODIUM) 2 MG capsule Take 2 mg by mouth 4 (Four) Times a Day As Needed for Diarrhea.   Unknown at Unknown time   • magnesium hydroxide (MILK OF MAGNESIA) 400 MG/5ML suspension Take 30 mL by mouth Daily As Needed for Constipation or Heartburn.   Unknown at Unknown time   • polyethylene glycol (MIRALAX) packet Take 17 g by mouth Daily As Needed (for constipation).   Unknown at Unknown time        Scheduled Meds:    amoxicillin-clavulanate 1 tablet Oral Q12H   divalproex 250 mg Oral TID   famotidine 40 mg Oral BID   folic acid 1 mg Oral Daily   heparin (porcine) 5,000 Units  "Subcutaneous Q12H   insulin glargine 8 Units Subcutaneous QAM   insulin lispro 0-9 Units Subcutaneous 4x Daily With Meals & Nightly   LORazepam 1 mg Oral Nightly   metoprolol tartrate 12.5 mg Oral BID   sodium chloride 10 mL Intravenous Q12H   thiamine 100 mg Oral Daily        Continuous Infusions:       PRN Meds:  •  acetaminophen **OR** acetaminophen **OR** acetaminophen  •  acetaminophen  •  bisacodyl  •  dextrose  •  dextrose  •  glucagon (human recombinant)  •  haloperidol lactate  •  insulin lispro **AND** insulin lispro  •  lactated ringers  •  polyethylene glycol  •  prochlorperazine  •  QUEtiapine  •  sodium chloride  •  sodium chloride      Allergies:  Patient has no known allergies.      Objective     Vital Signs   /95 (BP Location: Left arm, Patient Position: Lying)   Pulse 56   Temp 97.5 °F (36.4 °C) (Oral)   Resp 16   Ht 182.9 cm (72\")   Wt 61.4 kg (135 lb 5.8 oz)   SpO2 97%   BMI 18.36 kg/m²     Physical Exam:     General Appearance:   Sleeping, in bed   Head:    Normocephalic, without obvious abnormality, atraumatic   Eyes:          Patient did not open his eyes   Skin:  Dry           Mental Status Exam:    Hygiene:   fair  Cooperation:  sleeping   Eye Contact:  Closed  Psychomotor Behavior:  Slow  Affect:  Restricted  Mood: did not answer   Speech:  sleeping   Thought Progress:  Unable to demonstrate  Thought Content:  Unable to demonstrate  Suicidal:  did not express   Homicidal:  None  Hallucinations:  Not demonstrated today  Delusion:  Unable to demonstrate  Memory:  Deficits  Orientation:  Unable to evaluate  Reliability:  poor  Insight:  Poor  Judgement:  Impaired  Impulse Control:  Poor  Physical/Medical Issues:  Yes      Medications and allergies reviewed     Lab Results   Component Value Date    GLUCOSE 130 (H) 12/06/2019    CALCIUM 8.5 (L) 12/06/2019     12/06/2019    K 3.9 12/06/2019    CO2 30.0 (H) 12/06/2019     12/06/2019    BUN 10 12/06/2019    CREATININE 0.68 " (L) 12/06/2019    EGFRIFNONA 112 12/06/2019    BCR 14.7 12/06/2019    ANIONGAP 10.0 12/06/2019       Last Urine Toxicity     LAST URINE TOXICITY RESULTS Latest Ref Rng & Units 9/19/2018    CREATININE UR mg/dL 19.4    BARBITURATES SCREEN Negative Negative    BENZODIAZEPINE SCREEN, URINE Negative Negative    COCAINE SCREEN, URINE Negative Negative    METHADONE SCREEN, URINE Negative Negative          No results found for: PHENYTOIN, PHENOBARB, VALPROATE, CBMZ    Lab Results   Component Value Date     12/06/2019    BUN 10 12/06/2019    CREATININE 0.68 (L) 12/06/2019    TSH 1.050 09/19/2018    WBC 5.50 12/06/2019       Brief Urine Lab Results  (Last result in the past 365 days)      Color   Clarity   Blood   Leuk Est   Nitrite   Protein   CREAT   Urine HCG        12/01/19 1702 Red Cloudy Large (3+) Moderate (2+) Negative 100 mg/dL (2+)               Assessment/Plan       Sepsis due to pneumonia (CMS/[facility])    Uncontrolled type 2 diabetes mellitus with hyperglycemia (CMS/[facility])    Hyperlipidemia    HCAP (healthcare-associated pneumonia)    Primary hypertension    Cellulitis of right lower extremity    Dementia with behavioral disturbance (CMS/HCC)    Toxic metabolic encephalopathy       Assessment: Dementia with behavioral disturbances  Delirium secondary to medical condition (TME)   Treatment Plan: With the patient is compliant with medications, his mood is more manageable, he is less agitated.  Agitated behavior prevented him from placement to the rehab.  We will try to get assessment if the patient is suitable for inpatient geriatric stabilization, according to the , patient's wife is not willing to send the patient to.,   referred the patient information for review at [facility] to provide support  Treatment Plan discussed with: Family    I discussed the patients findings and my recommendations with family, nursing staff and SW     I have reviewed and approved the behavioral  health treatment plans and problem list. Yes     Referring MD has access to consult report and progress notes in EMR     Leyla Westfall MD  12/06/19  2:13 PM

## 2019-12-07 NOTE — PLAN OF CARE
Problem: Patient Care Overview  Goal: Plan of Care Review  Outcome: Ongoing (interventions implemented as appropriate)  Flowsheets (Taken 12/7/2019 8146)  Plan of Care Reviewed With: patient  Outcome Summary: patient still very confused and restless, slept for a couple hours last night, awaiting rehab placement, will continue to monitor patient

## 2019-12-07 NOTE — PROGRESS NOTES
"      [facility] Services Daily Progress Note      Hospitalist Team  LOS 6 days      Patient Care Team:  Carolina Tran FNP as PCP - General (Family Medicine)    Patient Location: 232/1      Subjective   Subjective     Chief Complaint / Subjective  Chief Complaint   Patient presents with   • Altered Mental Status       HPI limited with confusion and dementia. No family in room. Seems more calm again today    Brief Synopsis of Hospital Course/HPI  79-year-old male sent in from a mini home after he was noted to have altered mental status after lunch.  The patient apparently had been evaluated 10 days ago for worsening confusion and delirium but had a negative work-up at that time.  His blood sugar was reportedly normal.  There is no documented elevation of fever or chills although the patient's significant other noticed that he felt warm.  The patient apparently has recently been started on valproic acid for dementia features and no recent levels were reported.  Patient also apparently receives lorazepam for generalized anxiety     History from the wife.  She was generally unsatisfied with care at the [facility] inpatient facility.  Unknown why he was admitted over there.  Prior to that he was in nursing home and before that he was at [facility].  She states that he has not been home in about 2 months.  Mostly bedbound.  Needs plenty of help.  Known history of prostate cancer in the past with seed implantation.          Review of Systems   Unable to perform ROS: dementia         Objective   Objective      Vital Signs  Temp:  [97.4 °F (36.3 °C)-97.5 °F (36.4 °C)] 97.4 °F (36.3 °C)  Heart Rate:  [64-78] 64  Resp:  [15-18] 18  BP: (109-189)/(70-97) 109/70  Oxygen Therapy  SpO2: 96 %  Pulse Oximetry Type: Intermittent  Device (Oxygen Therapy): room air  Flow (L/min): 3  Flowsheet Rows      First Filed Value   Admission Height  182.9 cm (72\") Documented at 12/01/2019 1411   Admission Weight  " 79.4 kg (175 lb) Documented at 12/01/2019 1411        Intake & Output (last 3 days)       12/04 0701 - 12/05 0700 12/05 0701 - 12/06 0700 12/06 0701 - 12/07 0700 12/07 0701 - 12/08 0700    P.O. 960 1320 360 480    I.V. (mL/kg)  797 (13)      IV Piggyback 100       Total Intake(mL/kg) 1060 (17) 2117 (34.5) 360 (5.9) 480 (7.8)    Urine (mL/kg/hr)        Stool        Total Output        Net +1060 +2117 +360 +480            Urine Unmeasured Occurrence 6 x 5 x 5 x 2 x    Stool Unmeasured Occurrence 1 x  2 x 1 x        Lines, Drains & Airways    Active LDAs     None                  Physical Exam:    Physical Exam   Constitutional: No distress.   HENT:   Head: Normocephalic.   Eyes: Pupils are equal, round, and reactive to light.   Pulmonary/Chest: Effort normal.   Diminished BS at the bases   Abdominal: Soft.   Neurological: He is alert.   Oriented x1   Psychiatric:   Flat affect, confused follows some commands   Vitals reviewed.        Results Review:     I reviewed the patient's new clinical results.      Lab Results (last 24 hours)     Procedure Component Value Units Date/Time    POC Glucose Once [[id number]]  (Abnormal) Collected:  12/07/19 1138    Specimen:  Blood Updated:  12/07/19 1214     Glucose 143 mg/dL      Comment: Serial Number: [id number]:  [id number]       POC Glucose Once [[id number]]  (Abnormal) Collected:  12/07/19 0717    Specimen:  Blood Updated:  12/07/19 1213     Glucose 249 mg/dL      Comment: Serial Number: [id number]:  [id number]       POC Glucose Once [[id number]]  (Abnormal) Collected:  12/06/19 2040    Specimen:  Blood Updated:  12/07/19 1213     Glucose 166 mg/dL      Comment: Serial Number: [id number]:  [id number]       POC Glucose Once [[id number]]  (Abnormal) Collected:  12/06/19 1639    Specimen:  Blood Updated:  12/06/19 1644     Glucose 166 mg/dL      Comment: Serial Number: [id number]:  [id number]           No results found for: HGBA1C                Microbiology  Results (last 10 days)     Procedure Component Value - Date/Time    Respiratory Panel, PCR - Swab, Nasopharynx [[id number]]  (Abnormal) Collected:  12/01/19 2029    Lab Status:  Final result Specimen:  Swab from Nasopharynx Updated:  12/01/19 2154     ADENOVIRUS, PCR Not Detected     Coronavirus 229E Not Detected     Coronavirus HKU1 Not Detected     Coronavirus NL63 Not Detected     Coronavirus OC43 Not Detected     Human Metapneumovirus Detected     Human Rhinovirus/Enterovirus Not Detected     Influenza B PCR Not Detected     Parainfluenza Virus 1 Not Detected     Parainfluenza Virus 2 Not Detected     Parainfluenza Virus 3 Not Detected     Parainfluenza Virus 4 Not Detected     Bordetella pertussis pcr Not Detected     Influenza A H1 2009 PCR Not Detected     Chlamydophila pneumoniae PCR Not Detected     Mycoplasma pneumo by PCR Not Detected     Influenza A PCR Not Detected     Influenza A H3 Not Detected     Influenza A H1 Not Detected     RSV, PCR Not Detected    Urine Culture - Urine, Urine, Catheter [[id number]]  (Abnormal) Collected:  12/01/19 1702    Lab Status:  Final result Specimen:  Urine, Catheter Updated:  12/02/19 1328     Urine Culture Yeast isolated     Comment: No further work up.       Blood Culture - Blood, Arm, Right [[id number]] Collected:  12/01/19 1449    Lab Status:  Final result Specimen:  Blood from Arm, Right Updated:  12/06/19 1500     Blood Culture No growth at 5 days    Blood Culture - Blood, Arm, Left [[id number]] Collected:  12/01/19 1436    Lab Status:  Final result Specimen:  Blood from Arm, Left Updated:  12/06/19 1445     Blood Culture No growth at 5 days          ECG/EMG Results (most recent)     None                    Xr Chest 1 View    Result Date: 12/2/2019   1. Moderate interval worsening from December 1 2. Bilateral perihilar pneumonia is mild to moderate in degree along with change suggesting low-grade failure  Electronically Signed By-Yunier Coronel Jr. On:12/2/2019 7:24 AM  This report was finalized on [id number] by  Yunier Coronel Jr., .    Xr Chest 1 View    Result Date: 12/1/2019  1. 2 cm nodular opacity in the left upper chest is most likely due to calcified cartilage at the left first rib and. Recommend PA and lateral when possible to confirm. 2. Mild left basal infiltrate versus atelectasis.  Electronically Signed By-Joelle Luna On:12/1/2019 2:56 PM This report was finalized on [id number] by  Joelle Luna, .      Xrays, labs reviewed personally by physician.    Medication Review:   I have reviewed the patient's current medication list      Scheduled Meds    amoxicillin-clavulanate 1 tablet Oral Q12H   Divalproex Sodium 250 mg Oral Q8H   famotidine 40 mg Oral BID   folic acid 1 mg Oral Daily   heparin (porcine) 5,000 Units Subcutaneous Q12H   [START ON 12/8/2019] insulin glargine 10 Units Subcutaneous QAM   insulin lispro 0-9 Units Subcutaneous 4x Daily With Meals & Nightly   LORazepam 1 mg Oral Nightly   metoprolol tartrate 12.5 mg Oral BID   sodium chloride 10 mL Intravenous Q12H   thiamine 100 mg Oral Daily       Meds Infusions       Meds PRN  •  acetaminophen **OR** acetaminophen **OR** acetaminophen  •  acetaminophen  •  bisacodyl  •  dextrose  •  dextrose  •  glucagon (human recombinant)  •  haloperidol lactate  •  insulin lispro **AND** insulin lispro  •  lactated ringers  •  polyethylene glycol  •  prochlorperazine  •  QUEtiapine  •  sodium chloride  •  sodium chloride    I personally reviewed patient's EKG    Assessment/Plan   Assessment/Plan     Active Hospital Problems:  * Sepsis due to pneumonia (CMS/HCC)- (present on admission)  Resolved  Cx negative thus far     Toxic metabolic encephalopathy  D/t PNA and sepsis  tx underlying causes      HCAP (healthcare-associated pneumonia)- (present on admission)  Continue Augmentin for course   Supportive treatment  Cx negative thus far     Uncontrolled type 2 diabetes mellitus with hyperglycemia (CMS/HCC)- (present on  admission)  BG stable since yesterday   Stopped  pre-meal , continue lower dose basal  SSI and follow BG     Dementia with behavioral disturbance (CMS/HCC)  Psych to be re-consulted   Continue medication regimen  Seems good inpt psych candidate     Cellulitis of right lower extremity- (present on admission)  Improving abx as above    Primary hypertension- (present on admission)  Continue home medications              VTE Prophylaxis - heparin.      Code Status -   Code Status and Medical Interventions:   Ordered at: 12/01/19 1925     Code Status:    CPR     Medical Interventions (Level of Support Prior to Arrest):    Full       Discharge Planning    Was from Inpt behavioral health unit but wife does not want to return to that facility, rehab placement unsuccessful, now pending other inpatient psych options or and further discharge plan    Electronically signed by Gera Sandhu DO, 12/07/19, 4:00 PM.  [facility] Hospitalist Team

## 2019-12-08 PROBLEM — J18.9 SEPSIS DUE TO PNEUMONIA (HCC): Status: RESOLVED | Noted: 2019-01-01 | Resolved: 2019-01-01

## 2019-12-08 PROBLEM — A41.9 SEPSIS DUE TO PNEUMONIA (HCC): Status: RESOLVED | Noted: 2019-01-01 | Resolved: 2019-01-01

## 2019-12-08 NOTE — THERAPY TREATMENT NOTE
Patient Name: Yuriy Bennett  : 1940    MRN: [id number]                              Today's Date: 2019       Admit Date: 2019    Visit Dx:     ICD-10-CM ICD-9-CM   1. Pneumonia of right lower lobe due to infectious organism (CMS/[facility]) J18.1 486   2. Delirium due to another medical condition F05 293.0   3. Alzheimer's dementia with behavioral disturbance, unspecified timing of dementia onset (CMS/[facility]) G30.9 331.0    F02.81 294.11   4. Sepsis without acute organ dysfunction, due to unspecified organism (CMS/[facility]) A41.9 038.9     995.91   5. Mass of left chest wall R22.2 786.6     Patient Active Problem List   Diagnosis   • Cystitis   • Alzheimer's dementia with behavioral disturbance (CMS/[facility])   • Uncontrolled type 2 diabetes mellitus with hyperglycemia (CMS/[facility])   • H/O partial seizures   • History of prostate cancer   • Hyperlipidemia   • Noncompliance with medication regimen   • Nephrolithiasis   • Proteinuria   • HCAP (healthcare-associated pneumonia)   • Primary hypertension   • Sepsis due to pneumonia (CMS/[facility])   • Cellulitis of right lower extremity   • Dementia with behavioral disturbance (CMS/[facility])   • Toxic metabolic encephalopathy     Past Medical History:   Diagnosis Date   • Alzheimer disease (CMS/[facility])    • Cancer (CMS/[facility])     prostate   • Diabetes mellitus (CMS/[facility])    • Hypertension      History reviewed. No pertinent surgical history.  General Information     Row Name 19 1133          PT Evaluation Time/Intention    Document Type  therapy note (daily note)  -     Mode of Treatment  physical therapy  -     Row Name 19 1133          General Information    Patient Profile Reviewed?  yes  -MC     Prior Level of Function  -- Unsure of PLOF  -     Existing Precautions/Restrictions  fall PT has severe dementia   -     Barriers to Rehab  cognitive status  -     Row Name 19 1133          Relationship/Environment    Lives With  other (see comments) from behavior  "center, was in rehab prior  -     Row Name 12/08/19 1133          Resource/Environmental Concerns    Current Living Arrangements  residential facility  -     Row Name 12/08/19 1133          Home Main Entrance    Number of Stairs, Main Entrance  -- unknown  -     Row Name 12/08/19 1133          Safety Issues, Functional Mobility    Safety Issues Affecting Function (Mobility)  ability to follow commands;awareness of need for assistance;impulsivity;positioning of assistive device;insight into deficits/self awareness;judgment;problem solving;steps too close to assistive device;sequencing abilities Pt has severe dementia and is unsafe throughout tx  -     Impairments Affecting Function (Mobility)  balance;cognition;coordination;motor planning  -     Comment, Safety Issues/Impairments (Mobility)  Throughout tx pt would turn walker sideways and would not let therapist correct him. Pt needed constant cueing and has motor planning issues. Pt stated \"ill do it my way\"  -       User Key  (r) = Recorded By, (t) = Taken By, (c) = Cosigned By    Initials Name Provider Type     Stacia Pop PTA Physical Therapy Assistant        Mobility     Row Name 12/08/19 1136          Bed Mobility Assessment/Treatment    Bed Mobility Assessment/Treatment  supine-sit;sit-supine  -     Supine-Sit [place] (Bed Mobility)  minimum assist (75% patient effort);verbal cues  -     Sit-Supine [place] (Bed Mobility)  moderate assist (50% patient effort);verbal cues cognitive status limits understanding  -     Assistive Device (Bed Mobility)  draw sheet  -     Comment (Bed Mobility)  Pt has difficulty following commands and has motor planning issues. Takes increased time to complete all tasks. Cognitive status limits understanding.  -     Row Name 12/08/19 1136          Sit-Stand Transfer    Sit-Stand [place] (Transfers)  minimum assist (75% patient effort);verbal cues  -     Assistive Device (Sit-Stand " "Transfers)  walker, front-wheeled  -     Row Name 12/08/19 1136          Gait/Stairs Assessment/Training    Gait/Stairs Assessment/Training  gait/ambulation assistive device  -     [place] Level (Gait)  minimum assist (75% patient effort);2 person assist;verbal cues  -     Assistive Device (Gait)  walker, front-wheeled  -     Distance in Feet (Gait)  4 throughout room  -     Pattern (Gait)  step-to  -     Deviations/Abnormal Patterns (Gait)  festinating/shuffling;base of support, narrow  -     Bilateral Gait Deviations  forward flexed posture;heel strike decreased  -     Comment (Gait/Stairs)  Pt amb throughout room with Rwx and CGA. Pt refused to wear gown or brief and got agitated when asked to do so. Pt turned walker sideways and psuhed it to amb and refused tolet therapist turn it correct way. Pt had motor planning issues and took increased time to complete tasks even with v/cs. Pt has urine dripping throughout amb and would not stop to let therapist clean him up. Took increased time to get pt back to bed. Pt was say \"ok lets do it\" to commands but would not follow through. Once back in bed PTA and aide got pt cleaned up.   -       User Key  (r) = Recorded By, (t) = Taken By, (c) = Cosigned By    Initials Name Provider Type     Stacia Pop PTA Physical Therapy Assistant        Obj/Interventions     Row Name 12/08/19 1142          Static Sitting Balance    Level of [place] (Unsupported Sitting, Static Balance)  standby assist  -     Row Name 12/08/19 1142          Static Standing Balance    Level of [place] (Supported Standing, Static Balance)  contact guard assist  -     Time Able to Maintain Position (Supported Standing, Static Balance)  more than 5 minutes  -     Row Name 12/08/19 1142          Dynamic Standing Balance    Level of [place], Reaches Outside Midline (Standing, Dynamic Balance)  minimal assist, 75% patient effort  -     Time Able to Maintain " Position, Reaches Outside Midline (Standing, Dynamic Balance)  more than 5 minutes  -       User Key  (r) = Recorded By, (t) = Taken By, (c) = Cosigned By    Initials Name Provider Type    Stacia Carranza PTA Physical Therapy Assistant        Goals/Plan    No documentation.       Clinical Impression     Row Name 12/08/19 1142          Pain Scale: FACES Pre/Post-Treatment    Pain: FACES Scale, Pretreatment  0-->no hurt  -     Pain: FACES Scale, Post-Treatment  0-->no hurt  -     Row Name 12/08/19 1142          Plan of Care Review    Plan of Care Reviewed With  patient  -     Progress  improving  -     Outcome Summary  Pt very confused and his cognition was a barrier to tx. Pt has motor planning deficits but his mobility is improving and he does well with v/cs. Pt would benefit from cont therapy to address deficits.  -[facility] Name 12/08/19 [facility]          Physical Therapy Clinical Impression    Criteria for Skilled Interventions Met (PT Clinical Impression)  yes;treatment indicated  -     Rehab Potential (PT Clinical Summary)  good, to achieve stated therapy goals  -[facility] Name 12/08/19 1142          Positioning and Restraints    Pre-Treatment Position  in bed  -     Post Treatment Position  bed  -MC     In Bed  notified nsg;minna  -       User Key  (r) = Recorded By, (t) = Taken By, (c) = Cosigned By    Initials Name Provider Type    Stacia Carranza PTA Physical Therapy Assistant        Outcome Measures    No documentation.         PT Recommendation and Plan     Plan of Care Reviewed With: patient  Progress: improving  Outcome Summary: Pt very confused and his cognition was a barrier to tx. Pt has motor planning deficits but his mobility is improving and he does well with v/cs. Pt would benefit from cont therapy to address deficits.     Time Calculation:         PT G-Codes  Outcome Measure Options: AM-PAC 6 Clicks Basic Mobility (PT)  AM-PAC 6 Clicks Score (PT): 17    Stacia Pop  PTA  12/8/2019

## 2019-12-08 NOTE — PLAN OF CARE
Problem: Patient Care Overview  Goal: Plan of Care Review  Outcome: Ongoing (interventions implemented as appropriate)  Flowsheets  Taken 12/6/2019 0215 by Maria C Soria RN  Progress: no change  Taken 12/8/2019 0739 by Radha Nunes, RN  Plan of Care Reviewed With: patient  Taken 12/8/2019 0536 by Aurora Kenney LPN  Outcome Summary: patient still confused, awaiting for rehab placement

## 2019-12-08 NOTE — PLAN OF CARE
Problem: Patient Care Overview  Goal: Plan of Care Review  Outcome: Ongoing (interventions implemented as appropriate)  Flowsheets (Taken 12/8/2019 1142)  Progress: improving  Plan of Care Reviewed With: patient  Outcome Summary: Pt very confused and his cognition was a barrier to tx. Pt has motor planning deficits but his mobility is improving and he does well with v/cs. Pt would benefit from cont therapy to address deficits.

## 2019-12-08 NOTE — PROGRESS NOTES
[facility] Medicine Services Daily Progress Note      Hospitalist Team  LOS 7 days      Patient Care Team:  Carolina Tran FNP as PCP - General (Family Medicine)    Patient Location: 232/1      Subjective   Subjective     Chief Complaint / Subjective  Chief Complaint   Patient presents with   • Altered Mental Status       HPI limited with confusion and dementia. No family in room. Awake and eating lunch. Nurse reports one episode increased confusion this am but then has been stable thereafter.     Brief Synopsis of Hospital Course/HPI  79-year-old male sent in from a mini home after he was noted to have altered mental status after lunch.  The patient apparently had been evaluated 10 days ago for worsening confusion and delirium but had a negative work-up at that time.  His blood sugar was reportedly normal.  There is no documented elevation of fever or chills although the patient's significant other noticed that he felt warm.  The patient apparently has recently been started on valproic acid for dementia features and no recent levels were reported.  Patient also apparently receives lorazepam for generalized anxiety     History from the wife.  She was generally unsatisfied with care at the psych inpatient facility.  Unknown why he was admitted over there.  Prior to that he was in nursing home and before that he was at [facility].  She states that he has not been home in about 2 months.  Mostly bedbound.  Needs plenty of help.  Known history of prostate cancer in the past with seed implantation.          Review of Systems   Unable to perform ROS: dementia         Objective   Objective      Vital Signs  Temp:  [97.6 °F (36.4 °C)-98.1 °F (36.7 °C)] 97.6 °F (36.4 °C)  Heart Rate:  [64-84] 64  Resp:  [15-18] 18  BP: (148-169)/() 148/83  Oxygen Therapy  SpO2: 99 %  Pulse Oximetry Type: Intermittent  Device (Oxygen Therapy): room air  Flow (L/min): 3  Flowsheet Rows      First Filed  "Value   Admission Height  182.9 cm (72\") Documented at 12/01/2019 1411   Admission Weight  79.4 kg (175 lb) Documented at 12/01/2019 1411        Intake & Output (last 3 days)       12/05 0701 - 12/06 0700 12/06 0701 - 12/07 0700 12/07 0701 - 12/08 0700 12/08 0701 - 12/09 0700    P.O. 1320 360 480 720    I.V. (mL/kg) 797 (13)       IV Piggyback        Total Intake(mL/kg) 2117 (34.5) 360 (5.9) 480 (8) 720 (12)    Net +2117 +360 +480 +720            Urine Unmeasured Occurrence 5 x 5 x 6 x 4 x    Stool Unmeasured Occurrence  2 x 3 x 2 x        Lines, Drains & Airways    Active LDAs     None                  Physical Exam:    Physical Exam   Constitutional: No distress.   HENT:   Head: Normocephalic.   Eyes: Pupils are equal, round, and reactive to light.   Pulmonary/Chest: Effort normal.   Diminished BS at the bases   Abdominal: Soft.   Neurological: He is alert.   Oriented x1   Psychiatric:   Flat affect, confused follows some commands   Vitals reviewed.        Results Review:     I reviewed the patient's new clinical results.      Lab Results (last 24 hours)     Procedure Component Value Units Date/Time    POC Glucose Once [[id number]]  (Abnormal) Collected:  12/08/19 1159    Specimen:  Blood Updated:  12/08/19 1206     Glucose 225 mg/dL      Comment: Serial Number: [id number]:  [id number]       POC Glucose Once [[id number]]  (Abnormal) Collected:  12/08/19 0726    Specimen:  Blood Updated:  12/08/19 0729     Glucose 173 mg/dL      Comment: Serial Number: [id number]:  [id number]       POC Glucose Once [[id number]]  (Abnormal) Collected:  12/07/19 2139    Specimen:  Blood Updated:  12/07/19 2140     Glucose 183 mg/dL      Comment: Serial Number: [id number]:  [id number]       POC Glucose Once [[id number]]  (Abnormal) Collected:  12/07/19 1649    Specimen:  Blood Updated:  12/07/19 1654     Glucose 232 mg/dL      Comment: Serial Number: [id number]:  [id number]           No results found for: HGBA1C    "             Microbiology Results (last 10 days)     Procedure Component Value - Date/Time    Respiratory Panel, PCR - Swab, Nasopharynx [[id number]]  (Abnormal) Collected:  12/01/19 2029    Lab Status:  Final result Specimen:  Swab from Nasopharynx Updated:  12/01/19 2154     ADENOVIRUS, PCR Not Detected     Coronavirus 229E Not Detected     Coronavirus HKU1 Not Detected     Coronavirus NL63 Not Detected     Coronavirus OC43 Not Detected     Human Metapneumovirus Detected     Human Rhinovirus/Enterovirus Not Detected     Influenza B PCR Not Detected     Parainfluenza Virus 1 Not Detected     Parainfluenza Virus 2 Not Detected     Parainfluenza Virus 3 Not Detected     Parainfluenza Virus 4 Not Detected     Bordetella pertussis pcr Not Detected     Influenza A H1 2009 PCR Not Detected     Chlamydophila pneumoniae PCR Not Detected     Mycoplasma pneumo by PCR Not Detected     Influenza A PCR Not Detected     Influenza A H3 Not Detected     Influenza A H1 Not Detected     RSV, PCR Not Detected    Urine Culture - Urine, Urine, Catheter [[id number]]  (Abnormal) Collected:  12/01/19 1702    Lab Status:  Final result Specimen:  Urine, Catheter Updated:  12/02/19 1328     Urine Culture Yeast isolated     Comment: No further work up.       Blood Culture - Blood, Arm, Right [[id number]] Collected:  12/01/19 1449    Lab Status:  Final result Specimen:  Blood from Arm, Right Updated:  12/06/19 1500     Blood Culture No growth at 5 days    Blood Culture - Blood, Arm, Left [[id number]] Collected:  12/01/19 1436    Lab Status:  Final result Specimen:  Blood from Arm, Left Updated:  12/06/19 1445     Blood Culture No growth at 5 days          ECG/EMG Results (most recent)     None                    Xr Chest 1 View    Result Date: 12/2/2019   1. Moderate interval worsening from December 1 2. Bilateral perihilar pneumonia is mild to moderate in degree along with change suggesting low-grade failure  Electronically Signed By-Yunier Coronel   On:12/2/2019 7:24 AM This report was finalized on [id number] by  Yunier Coronel Jr., .    Xr Chest 1 View    Result Date: 12/1/2019  1. 2 cm nodular opacity in the left upper chest is most likely due to calcified cartilage at the left first rib and. Recommend PA and lateral when possible to confirm. 2. Mild left basal infiltrate versus atelectasis.  Electronically Signed By-Joelle Luna On:12/1/2019 2:56 PM This report was finalized on [id number] by  Joelle Luna, .      Xrays, labs reviewed personally by physician.    Medication Review:   I have reviewed the patient's current medication list      Scheduled Meds    amoxicillin-clavulanate 1 tablet Oral Q12H   Divalproex Sodium 250 mg Oral Q8H   famotidine 40 mg Oral BID   folic acid 1 mg Oral Daily   heparin (porcine) 5,000 Units Subcutaneous Q12H   insulin glargine 10 Units Subcutaneous QAM   insulin lispro 0-9 Units Subcutaneous 4x Daily With Meals & Nightly   LORazepam 1 mg Oral Nightly   metoprolol tartrate 12.5 mg Oral BID   sodium chloride 10 mL Intravenous Q12H   thiamine 100 mg Oral Daily       Meds Infusions       Meds PRN  •  acetaminophen **OR** acetaminophen **OR** acetaminophen  •  acetaminophen  •  bisacodyl  •  dextrose  •  dextrose  •  glucagon (human recombinant)  •  haloperidol lactate  •  insulin lispro **AND** insulin lispro  •  lactated ringers  •  polyethylene glycol  •  prochlorperazine  •  QUEtiapine  •  sodium chloride  •  sodium chloride    I personally reviewed patient's EKG    Assessment/Plan   Assessment/Plan     Active Hospital Problems:  Toxic metabolic encephalopathy  D/t PNA and sepsis  tx underlying causes    Slow to improve    HCAP (healthcare-associated pneumonia)- (present on admission)  Continue Augmentin for course   Supportive treatment  Cx negative     Uncontrolled type 2 diabetes mellitus with hyperglycemia (CMS/HCC)- (present on admission)  BG stable no hypoglycemic episodes now  Stopped  pre-meal , continue  basal  SSI and follow BG     Dementia with behavioral disturbance (CMS/HCC)  Psych following  Continue medication regimen      Cellulitis of right lower extremity- (present on admission)  Improving abx as above    Primary hypertension- (present on admission)  Continue home medications              VTE Prophylaxis - heparin.      Code Status -   Code Status and Medical Interventions:   Ordered at: 12/01/19 1925     Code Status:    CPR     Medical Interventions (Level of Support Prior to Arrest):    Full       Discharge Planning    Was from In behavioral health unit but wife does not want to return to that facility, rehab placement unsuccessful, now pending other inpatient psych options or and further discharge plan.  following    Electronically signed by Gera Sandhu DO, 12/08/19, 3:35 PM.  [facility] Hospitalist Team

## 2019-12-08 NOTE — PLAN OF CARE
Problem: Patient Care Overview  Goal: Plan of Care Review  Outcome: Ongoing (interventions implemented as appropriate)  Flowsheets (Taken 12/8/2019 5885)  Plan of Care Reviewed With: patient  Outcome Summary: patient still confused, awaiting for rehab placement

## 2019-12-09 NOTE — PLAN OF CARE
Problem: Patient Care Overview  Goal: Plan of Care Review  Outcome: Ongoing (interventions implemented as appropriate)  Flowsheets  Taken 12/9/2019 0431  Progress: improving  Taken 12/8/2019 1901  Plan of Care Reviewed With: patient  Note:   Patient slept through the night with no complaints of pain, Pt is very confused but didn't refuse treatment this evening

## 2019-12-09 NOTE — PLAN OF CARE
Plan of Care Review  Plan of Care Reviewed With: patient  Outcome Summary: pt very confused and having max difficulty following commands, especially directional cues. he is having trouble initiating tasks as well. continues to need min a for safe completion of adls/mobility. remains at high risk for falls. cont poc.

## 2019-12-09 NOTE — PROGRESS NOTES
[facility] Medicine Services Daily Progress Note      Hospitalist Team  LOS 8 days      Patient Care Team:  Carolina Tran FNP as PCP - General (Family Medicine)    Patient Location: 232/1      Subjective   Subjective     Chief Complaint / Subjective  Chief Complaint   Patient presents with   • Altered Mental Status       HPI limited with confusion and dementia. No family in room. Awake and eating lunch. Nurse reports one episode increased confusion this am but then has been stable thereafter.     Brief Synopsis of Hospital Course/HPI  79-year-old male sent in from a mini home after he was noted to have altered mental status after lunch.  The patient apparently had been evaluated 10 days ago for worsening confusion and delirium but had a negative work-up at that time.  His blood sugar was reportedly normal.  There is no documented elevation of fever or chills although the patient's significant other noticed that he felt warm.  The patient apparently has recently been started on valproic acid for dementia features and no recent levels were reported.  Patient also apparently receives lorazepam for generalized anxiety     History from the wife.  She was generally unsatisfied with care at the psych inpatient facility.  Unknown why he was admitted over there.  Prior to that he was in nursing home and before that he was at [facility].  She states that he has not been home in about 2 months.  Mostly bedbound.  Needs plenty of help.  Known history of prostate cancer in the past with seed implantation.          Review of Systems   Unable to perform ROS: dementia         Objective   Objective      Vital Signs  Temp:  [97.6 °F (36.4 °C)-97.8 °F (36.6 °C)] 97.6 °F (36.4 °C)  Heart Rate:  [64-84] 69  Resp:  [16-18] 16  BP: (148-169)/() 157/94  Oxygen Therapy  SpO2: 97 %  Pulse Oximetry Type: Intermittent  Device (Oxygen Therapy): room air  Flow (L/min): 3  Flowsheet Rows      First Filed  "Value   Admission Height  182.9 cm (72\") Documented at 12/01/2019 1411   Admission Weight  79.4 kg (175 lb) Documented at 12/01/2019 1411        Intake & Output (last 3 days)       12/06 0701 - 12/07 0700 12/07 0701 - 12/08 0700 12/08 0701 - 12/09 0700    P.O. 360 480 720    Total Intake(mL/kg) 360 (5.9) 480 (8) 720 (12)    Net +360 +480 +720           Urine Unmeasured Occurrence 5 x 6 x 7 x    Stool Unmeasured Occurrence 2 x 3 x 2 x        Lines, Drains & Airways    Active LDAs     None                  Physical Exam:    Physical Exam   Constitutional: No distress.   HENT:   Head: Normocephalic.   Eyes: Pupils are equal, round, and reactive to light.   Pulmonary/Chest: Effort normal.   Diminished BS at the bases   Abdominal: Soft.   Neurological: He is alert.   Oriented x1   Psychiatric:   Flat affect, confused follows some commands   Vitals reviewed.        Results Review:     I reviewed the patient's new clinical results.      Lab Results (last 24 hours)     Procedure Component Value Units Date/Time    POC Glucose Once [[id number]]  (Normal) Collected:  12/08/19 2034    Specimen:  Blood Updated:  12/08/19 2035     Glucose 105 mg/dL      Comment: Serial Number: [id number]:  [id number]       POC Glucose Once [[id number]]  (Normal) Collected:  12/08/19 1650    Specimen:  Blood Updated:  12/08/19 1658     Glucose 103 mg/dL      Comment: Serial Number: [id number]:  [id number]       POC Glucose Once [[id number]]  (Abnormal) Collected:  12/08/19 1159    Specimen:  Blood Updated:  12/08/19 1206     Glucose 225 mg/dL      Comment: Serial Number: [id number]:  [id number]       POC Glucose Once [[id number]]  (Abnormal) Collected:  12/08/19 0726    Specimen:  Blood Updated:  12/08/19 0729     Glucose 173 mg/dL      Comment: Serial Number: [id number]:  [id number]           No results found for: HGBA1C                Microbiology Results (last 10 days)     Procedure Component Value - Date/Time    Respiratory " Panel, PCR - Swab, Nasopharynx [[id number]]  (Abnormal) Collected:  12/01/19 2029    Lab Status:  Final result Specimen:  Swab from Nasopharynx Updated:  12/01/19 2154     ADENOVIRUS, PCR Not Detected     Coronavirus 229E Not Detected     Coronavirus HKU1 Not Detected     Coronavirus NL63 Not Detected     Coronavirus OC43 Not Detected     Human Metapneumovirus Detected     Human Rhinovirus/Enterovirus Not Detected     Influenza B PCR Not Detected     Parainfluenza Virus 1 Not Detected     Parainfluenza Virus 2 Not Detected     Parainfluenza Virus 3 Not Detected     Parainfluenza Virus 4 Not Detected     Bordetella pertussis pcr Not Detected     Influenza A H1 2009 PCR Not Detected     Chlamydophila pneumoniae PCR Not Detected     Mycoplasma pneumo by PCR Not Detected     Influenza A PCR Not Detected     Influenza A H3 Not Detected     Influenza A H1 Not Detected     RSV, PCR Not Detected    Urine Culture - Urine, Urine, Catheter [[id number]]  (Abnormal) Collected:  12/01/19 1702    Lab Status:  Final result Specimen:  Urine, Catheter Updated:  12/02/19 1328     Urine Culture Yeast isolated     Comment: No further work up.       Blood Culture - Blood, Arm, Right [[id number]] Collected:  12/01/19 1449    Lab Status:  Final result Specimen:  Blood from Arm, Right Updated:  12/06/19 1500     Blood Culture No growth at 5 days    Blood Culture - Blood, Arm, Left [[id number]] Collected:  12/01/19 1436    Lab Status:  Final result Specimen:  Blood from Arm, Left Updated:  12/06/19 1445     Blood Culture No growth at 5 days          ECG/EMG Results (most recent)     None                    Xr Chest 1 View    Result Date: 12/2/2019   1. Moderate interval worsening from December 1 2. Bilateral perihilar pneumonia is mild to moderate in degree along with change suggesting low-grade failure  Electronically Signed By-Yunier Coronel Jr. On:12/2/2019 7:24 AM This report was finalized on [id number] by  Yunier Coronel Jr., .    Xr Chest 1  View    Result Date: 12/1/2019  1. 2 cm nodular opacity in the left upper chest is most likely due to calcified cartilage at the left first rib and. Recommend PA and lateral when possible to confirm. 2. Mild left basal infiltrate versus atelectasis.  Electronically Signed By-Joelle Luna On:12/1/2019 2:56 PM This report was finalized on [id number] by  Joelle Luna, .      Xrays, labs reviewed personally by physician.    Medication Review:   I have reviewed the patient's current medication list      Scheduled Meds    amoxicillin-clavulanate 1 tablet Oral Q12H   Divalproex Sodium 250 mg Oral Q8H   famotidine 40 mg Oral BID   folic acid 1 mg Oral Daily   heparin (porcine) 5,000 Units Subcutaneous Q12H   insulin glargine 10 Units Subcutaneous QAM   insulin lispro 0-9 Units Subcutaneous 4x Daily With Meals & Nightly   LORazepam 1 mg Oral Nightly   metoprolol tartrate 12.5 mg Oral BID   sodium chloride 10 mL Intravenous Q12H   thiamine 100 mg Oral Daily       Meds Infusions       Meds PRN  •  acetaminophen **OR** acetaminophen **OR** acetaminophen  •  acetaminophen  •  bisacodyl  •  dextrose  •  dextrose  •  glucagon (human recombinant)  •  haloperidol lactate  •  insulin lispro **AND** insulin lispro  •  lactated ringers  •  polyethylene glycol  •  prochlorperazine  •  QUEtiapine  •  sodium chloride  •  sodium chloride    I personally reviewed patient's EKG    Assessment/Plan Humanmetapneumo virus infection  Yeast UTI- start Diflucan  Assessment/Plan     Active Hospital Problems:  Toxic metabolic encephalopathy  D/t PNA and sepsis  tx underlying causes    Slow to improve    Dementia with behavioral disturbance (CMS/HCC)  Psych following  Continue medication regimen      Cellulitis of right lower extremity- (present on admission)  Improving abx as above    Primary hypertension- (present on admission)  Continue home medications      HCAP (healthcare-associated pneumonia)- (present on admission)  Continue Augmentin for  course   Supportive treatment  Cx negative     Uncontrolled type 2 diabetes mellitus with hyperglycemia (CMS/[facility])- (present on admission)  BG stable no hypoglycemic episodes now  Stopped  pre-meal , continue basal  SSI and follow BG             VTE Prophylaxis - heparin.      Code Status -   Code Status and Medical Interventions:   Ordered at: 12/01/19 1925     Code Status:    CPR     Medical Interventions (Level of Support Prior to Arrest):    Full       Discharge Planning    Was from In behavioral health unit but wife does not want to return to that facility, rehab placement unsuccessful, now pending other inpatient psych options or and further discharge plan.  following    Electronically signed by Doug Diaz Jr., MD, 12/09/19, 6:52 AM.  [facility] Hospitalist Team

## 2019-12-09 NOTE — THERAPY TREATMENT NOTE
"Acute Care - Occupational Therapy Treatment Note   [facility]     Patient Name: Yuriy Bennett  : 1940  MRN: [id number]  Today's Date: 2019             Admit Date: 2019       ICD-10-CM ICD-9-CM   1. Pneumonia of right lower lobe due to infectious organism (CMS/HCC) J18.1 486   2. Delirium due to another medical condition F05 293.0   3. Alzheimer's dementia with behavioral disturbance, unspecified timing of dementia onset (CMS/HCC) G30.9 331.0    F02.81 294.11   4. Sepsis without acute organ dysfunction, due to unspecified organism (CMS/HCC) A41.9 038.9     995.91   5. Mass of left chest wall R22.2 786.6     Patient Active Problem List   Diagnosis   • Cystitis   • Alzheimer's dementia with behavioral disturbance (CMS/HCC)   • Uncontrolled type 2 diabetes mellitus with hyperglycemia (CMS/HCC)   • H/O partial seizures   • History of prostate cancer   • Hyperlipidemia   • Noncompliance with medication regimen   • Nephrolithiasis   • Proteinuria   • HCAP (healthcare-associated pneumonia)   • Primary hypertension   • Cellulitis of right lower extremity   • Dementia with behavioral disturbance (CMS/HCC)   • Toxic metabolic encephalopathy     Past Medical History:   Diagnosis Date   • Alzheimer disease (CMS/HCC)    • Cancer (CMS/HCC)     prostate   • Diabetes mellitus (CMS/HCC)    • Hypertension      History reviewed. No pertinent surgical history.    Therapy Treatment    Rehabilitation Treatment Summary     Row Name 19 1200             Treatment Time/Intention    Discipline  occupational therapist  -AL      Document Type  therapy note (daily note)  -AL      Subjective Information  complains of;pain \"heart hurts\" unable to elaborate d/t confusion  -AL      Mode of Treatment  occupational therapy  -AL      Patient/Family Observations  pt supine, confused, sitter present. pt soiled of urine.  pt w/ max difficulty following directional cues.   -AL      Patient Effort  adequate  -AL      Comment  rn " reported pt was found confused, walking in hallway this am in the nude. now has a sitter.  -AL      Recorded by [AL] Vianey Rodriguez OT 12/09/19 1305      Row Name 12/09/19 1200             Cognitive Assessment/Intervention- PT/OT    Orientation Status (Cognition)  person  -AL      Recorded by [AL] Vianey Rodriguez OT 12/09/19 1305      Row Name 12/09/19 1200             Cognitive Assessment Intervention- SLP    Cognitive Function (Cognition)  severe impairment  -AL      Thought Organization (Cognitive)  severe impairment  -AL      Problem Solving (Cognitive)  severe impairment  -AL      Executive Function (Cognition)  severe impairment  -AL      Cognition, Comment  unable to follow directional cues, poor motor control and coordination w/ rw. difficulty releasing grasp on rw when sitting, picked up walker completely, required manual removal of pt'shands. max difficulty initiating tasks. needed hand over hand assist and max cues to initiate, then perseverates after intiated.   -AL      Recorded by [AL] Vianey Rodriguez OT 12/09/19 1305      Row Name 12/09/19 1200             Safety Issues, Functional Mobility    Safety Issues Affecting Function (Mobility)  ability to follow commands;at risk behavior observed;awareness of need for assistance;impulsivity;insight into deficits/self awareness;judgment;positioning of assistive device;problem solving;safety precaution awareness;safety precautions follow-through/compliance;sequencing abilities  -AL      Impairments Affecting Function (Mobility)  balance;cognition;coordination;endurance/activity tolerance;motor control;motor planning;pain  -AL      Recorded by [AL] Vianey Rodriguez OT 12/09/19 1305      Row Name 12/09/19 1200             Bed Mobility Assessment/Treatment    Supine-Sit Colver (Bed Mobility)  minimum assist (75% patient effort)  -AL      Recorded by [AL] Vianey Rodriguez OT 12/09/19 1305      Row Name 12/09/19 1200             Transfer  "Assessment/Treatment    Transfer Assessment/Treatment  stand-sit transfer  -AL      Recorded by [AL] Vianey Rodriguez, OT 12/09/19 1305      Row Name 12/09/19 1200             Sit-Stand Transfer    Sit-Stand Sanders (Transfers)  minimum assist (75% patient effort);verbal cues;nonverbal cues (demo/gesture)  -AL      Assistive Device (Sit-Stand Transfers)  walker, front-wheeled  -AL      Recorded by [AL] Vianey Rodriguez, OT 12/09/19 1305      Row Name 12/09/19 1200             Stand-Sit Transfer    Stand-Sit Sanders (Transfers)  moderate assist (50% patient effort);verbal cues;nonverbal cues (demo/gesture)  -AL      Assistive Device (Stand-Sit Transfers)  walker, front-wheeled  -AL      Recorded by [AL] Vianey Rodriguez, OT 12/09/19 1305      Row Name 12/09/19 1200             Gait/Stairs Assessment/Training    Comment (Gait/Stairs)  pt unable to follow directional cues. when told to turn, pt continued walking straight and stated, \"i am\". stated same when told to sit although pt not initiating sitting.   -AL      Recorded by [AL] Vianey Rodriguez, OT 12/09/19 1305      Row Name 12/09/19 1200             Lower Body Dressing Assessment/Training    Lower Body Dressing Sanders Level  minimum assist (75% patient effort);socks  -AL      Recorded by [AL] Vianey Rodriguez, OT 12/09/19 1305      Row Name 12/09/19 1200             Grooming Assessment/Training    Sanders Level (Grooming)  grooming skills;wash face, hands;minimum assist (75% patient effort) hand over hand assist to initiate.   -AL      Recorded by [AL] Vianey Rodriguez, OT 12/09/19 1305      Row Name 12/09/19 1200             Positioning and Restraints    Pre-Treatment Position  in bed  -AL      Post Treatment Position  chair  -AL      In Chair  notified nsg;sitting;call light within reach;encouraged to call for assist;exit alarm on;with other staff sitter present  -AL      Recorded by [AL] Vianey Rodriguez, OT 12/09/19 1305      Row Name 12/09/19 " 1200             Plan of Care Review    Outcome Summary  pt very confused and having max difficulty following commands, especially directional cues. he is having trouble initiating tasks as well. continues to need min a for safe completion of adls/mobility. remains at high risk for falls. cont poc.  -AL      Recorded by [AL] Vianey Rodriguez OT 12/09/19 1305      Row Name 12/09/19 1200             Outcome Summary/Treatment Plan (OT)    Daily Summary of Progress (OT)  unable to show any progress toward functional goals  -AL      Recorded by [AL] Vianey Rodriguez OT 12/09/19 1305        User Key  (r) = Recorded By, (t) = Taken By, (c) = Cosigned By    Initials Name Effective Dates Discipline    AL Vianey Rodriguez OT 03/01/19 -  OT                 OT Recommendation and Plan  Outcome Summary/Treatment Plan (OT)  Daily Summary of Progress (OT): unable to show any progress toward functional goals  Daily Summary of Progress (OT): unable to show any progress toward functional goals  Outcome Summary: pt very confused and having max difficulty following commands, especially directional cues. he is having trouble initiating tasks as well. continues to need min a for safe completion of adls/mobility. remains at high risk for falls. cont poc.       Time Calculation:              Vianey Rodriguez OT  12/9/2019

## 2019-12-09 NOTE — PROGRESS NOTES
Continued Stay Note  [facility]     Patient Name: Yuriy Bennett  MRN: [id number]  Today's Date: 12/9/2019    Admit Date: 12/1/2019    Discharge Plan     Row Name 12/09/19 1012       Plan    Plan Comments  Pt spouse continues to refuse to give this SW any next steps on discharging the patient. SW spoke to the wife on the phone who states she will be in later. SW reiereated the importance of planning since no psych facility in [place] will accept. Wife still continues to not allow patietn to go to a psych facility in [place]. SW advised that patient has been medically stable since Friday. Wife states that she cannot take patient home and is working with a facility in [facility]. Wife would not give SW the name of the facility. Wife states she does not like to be pushed and will not give this SW an answer at this time. Wife states she will be in later.         Discharge Codes    No documentation.       Expected Discharge Date and Time     Expected Discharge Date Expected Discharge Time    Dec 9, 2019         SAILAJA Moreno    Phone # [phone]  Cell #[phone]  Fax#[phone]  [email or web address]      SAILAJA Moreno

## 2019-12-10 NOTE — PLAN OF CARE
Problem: Patient Care Overview  Goal: Plan of Care Review  Outcome: Ongoing (interventions implemented as appropriate)  Flowsheets  Taken 12/9/2019 0431  Progress: improving  Taken 12/10/2019 0340  Plan of Care Reviewed With: patient  Outcome Summary: Patient took medications with applesauce with no issues, is very confused and can only follow simple requests

## 2019-12-10 NOTE — PROGRESS NOTES
Nutrition Services    Patient Name:  Yuriy Bennett  YOB: 1940  MRN: [id number]  Admit Date:  12/1/2019    Comment: Moderate malnutrition noted, ordering Boost Glucose Control BID to provide additional nutrients (500 kcal/28 grams protein) between meals.    Reason for Assessment     Row Name           Reason for Assessment    Reason For Assessment Follow-Up Assessment    Nursing Admission Screen       Diagnosis H&P: Alzheimer's diesease, Prostate Cancer, DM, HTN     Current Problems:   Admitted for worsening confusion and delirium    TME d/t Sepsis r/t PNA - slow to improve    HCAP    Dementia- psych following, pt more pleasant today, yet confused    Cellulitis of LE    Uncontrolled DM          Nutrition/Diet History     Row Name           Nutrition/Diet History - from 12/4/19 assessment    Typical Food/Fluid Intake Spoke with RN who reports pt is very confused and agitated at times. Pt was reported to be swinging at night staff. Upon entering the room pt appeared agitated, attempting to get out of chair, setting of chair alarm. RN made aware.        Functional Status From [facility]        Food Allergies  NKFA        Factors Affecting Nutritional Intake  Dementia          Anthropometrics             Anthropometrics    Height 72 inches     Weight 60 kg (132 lb) - 12/10/19  63.6 kg (140 lb) - 12/4/19        Admit Weight    Admit Weight 63.4 kg (139 lb 12.4 oz)- 12/3/19  79.4 kg (175lb)- 12/1/19     Highly ? Weight changes, pt appears maybe btw these two weights?  Note: Weight has been ~130s this admit       Ideal Body Weight (IBW)    Ideal Body Weight (IBW) (kg) 178#     % Ideal Body Weight 74%        Usual Body Weight (UBW)    Usual Body Weight Unable to determine     % Usual Body Weight Unable to determine     Weight Hx  136# (9/19/18)        Body Mass Index (BMI)    BMI (kg/m2) 17.94           Labs/Tests/Procedures/Meds                Labs    Pertinent Lab Results Comments Gluc 82/183, Na 141, K 4.1,  BUN 15, Crt 0.7 L, Ca 8.7, Alb 3.1 H, Hgb 12.2 L, Hct 36.7 L        Medications    Pertinent Medications Comments Depakote, Pepcid, folvite, lantus, humalog, ativan, B1         Physical Findings                Physical Findings    Overall Physical Appearance 12/10: Completed NFPE noting moderate fat loss and moderate muscle wasting    12/4: Unable to determine- pt appears perhaps appropriate for age and condition, however did not conduct NFPE r/t agitation.        Gastrointestinal + BM 12/8        Tubes None at this time        Oral/Mouth Cavity Noted on a textured modified diet- has not been seen by ST        Skin Intact          Estimated/Assessed Needs              Calculation Measurements    Weight Used For Calculations      Height         KCAL/KG    KCAL/KG               Protein Requirements    Weight Used For Protein Calculations      Est Protein Requirement Amount (gms/kg)      Estimated Protein Requirements (gms/day)         Fluid Requirements    Estimated Fluid Requirements (mL/day)          Nutrition Prescription Ordered                Nutrition Prescription PO    Current PO Diet  Consistent Carb, Mechanical Soft with NTL     Supplement  -- -     Supplement Frequency  -- -        Nutrition Prescription EN    Enteral Route  -- -     Product  -- -     TF Delivery Method  -----     Continuous TF Goal Rate (mL/hr)  -- -     Water flush (mL)   -- -     Water Flush Frequency  -- -        Nutrition Prescription PN    PN Route  -- -     PN Goal Volume (mL)  -- -     Dextrose (Kcal)  -- -     Amino Acid (gm)  -- -     Lipid Concentration (%)  -- -     Lipid Volume (mL)  -- -     Lipid Frequency  -- -         Evaluation of Received Nutrient/Fluid Intake                PO Evaluation    % PO Intake 73% x 20 meals this admit    RN/pt confirm good po intakes        EN Evaluation    TF Changes  -- -     TF Residual  -- -     TF Tolerance  -- -        PN Evaluation    Number of Days PN Evaluated  -- -            Problem/Interventions:  Problem 1                Nutrition Diagnoses Problem 1    Problem 1 Moderate chronic disease related malnutrition RT physiological causes (Prostate Cancer, Alzheimer's Disease, DM2) AEB findings from nutrition-focused physical exam (moderate fat loss/moderate muscle wasting).           Intervention Goal                Intervention Goal    General Pt will cont to eat >75% of meals          Nutrition Intervention                Nutrition Intervention    RD/Tech Action Ordering ONS - pt agreeable         Nutrition Prescription     Row Name           Nutrition Prescription PO    Supplement Boost Glucose Control     Supplement Frequency BID        Nutrition Prescription EN    Enteral Prescription  -- -        Nutrition Prescription PN    Parenteral Prescription  -- -         Education/Evaluation                Monitor/Evaluation    Monitor PO intake, ONS intake, weight, labs, meds, skin, and BMs               Electronically signed by:  Catrina Galvan RD  12/10/19 3:52 PM

## 2019-12-10 NOTE — PROGRESS NOTES
Malnutrition Severity Assessment    Patient Name:  Yuriy Bennett  YOB: 1940  MRN: [id number]  Admit Date:  12/1/2019    Patient meets criteria for : Moderate (non-severe) Malnutrition    Comments:  Moderate chronic disease related malnutrition related to physiological causes (Alzheimer's Disease, DM2, hx of Prostate Cancer) as evidenced by findings from nutrition-focused physical exam (moderate fat loss/moderate muscle wasting).    Plan: 1. Ordering Boost Glucose Control BID to provide additional nutrients (500 kcal/28 grams protein) between meals. 2. RDN will continue to monitor po intakes and ONS acceptance.      Malnutrition Severity Assessment  Malnutrition Type: Chronic Disease - Related Malnutrition     Malnutrition Type (last 8 hours)      Malnutrition Severity Assessment     Row Name 12/10/19 1603       Malnutrition Severity Assessment    Malnutrition Type  Chronic Disease - Related Malnutrition    Row Name 12/10/19 1603       Muscle Loss    Loss of Muscle Mass Findings  Moderate    [place] Region  Moderate - slight depression    Clavicle Bone Region  Severe - protruding prominent bone    Acromion Bone Region  Moderate - acromion may slightly protrude    Scapular Bone Region  -- UTD r/t patient positioning    Dorsal Hand Region  Moderate - slight depression    Patellar Region  Moderate - patella more prominent, less muscle definition around patella    Anterior Thigh Region  Moderate - mild depression on inner thigh    Posterior Calf Region  Moderate - some roundness, slight firmness    Row Name 12/10/19 1602       Fat Loss    Subcutaneous Fat Loss Findings  Moderate    Orbital Region   Moderate -  somewhat hollowness, slightly dark circles    Upper Arm Region  Moderate - some fat tissue, not ample    Thoracic & Lumbar Region  -- UTD at this time r/t patient positioning    Row Name 12/10/19 1600       Criteria Met (Must meet criteria for severity in at least 2 of these categories: M Wasting,  Fat Loss, Fluid, Secondary Signs, Wt. Status, Intake)    Patient meets criteria for   Moderate (non-severe) Malnutrition          Electronically signed by:  Catrina Galvan RD  12/10/19 4:04 PM

## 2019-12-10 NOTE — THERAPY TREATMENT NOTE
Patient Name: Yuriy Bennett  : 1940    MRN: [id number]                              Today's Date: 12/10/2019       Admit Date: 2019    Visit Dx:     ICD-10-CM ICD-9-CM   1. Pneumonia of right lower lobe due to infectious organism (CMS/[facility]) J18.1 486   2. Delirium due to another medical condition F05 293.0   3. Alzheimer's dementia with behavioral disturbance, unspecified timing of dementia onset (CMS/[facility]) G30.9 331.0    F02.81 294.11   4. Sepsis without acute organ dysfunction, due to unspecified organism (CMS/[facility]) A41.9 038.9     995.91   5. Mass of left chest wall R22.2 786.6     Patient Active Problem List   Diagnosis   • Cystitis   • Alzheimer's dementia with behavioral disturbance (CMS/[facility])   • Uncontrolled type 2 diabetes mellitus with hyperglycemia (CMS/[facility])   • H/O partial seizures   • History of prostate cancer   • Hyperlipidemia   • Noncompliance with medication regimen   • Nephrolithiasis   • Proteinuria   • HCAP (healthcare-associated pneumonia)   • Primary hypertension   • Cellulitis of right lower extremity   • Dementia with behavioral disturbance (CMS/HCC)   • Toxic metabolic encephalopathy     Past Medical History:   Diagnosis Date   • Alzheimer disease (CMS/[facility])    • Cancer (CMS/[facility])     prostate   • Diabetes mellitus (CMS/[facility])    • Hypertension      History reviewed. No pertinent surgical history.  General Information     Row Name 12/10/19 0912          PT Evaluation Time/Intention    Document Type  therapy note (daily note)  -SC     Mode of Treatment  physical therapy;individual therapy  -SC     Row Name 12/10/19 0912          General Information    Existing Precautions/Restrictions  fall dementia  -SC     Row Name 12/10/19 0912          Resource/Environmental Concerns    Current Living Arrangements  residential facility  -SC     Row Name 12/10/19 0912          Cognitive Assessment/Intervention- PT/OT    Orientation Status (Cognition)  person  -SC     Cognitive  "Assessment/Intervention Comment  \"I think I have been  here before\".  pt was agreeable and compliant today  -SC     Row Name 12/10/19 0912          Safety Issues, Functional Mobility    Safety Issues Affecting Function (Mobility)  awareness of need for assistance;impulsivity;insight into deficits/self awareness;judgment;problem solving;safety precaution awareness;safety precautions follow-through/compliance;sequencing abilities  -SC     Impairments Affecting Function (Mobility)  balance;cognition;coordination;endurance/activity tolerance;motor planning  -SC     Comment, Safety Issues/Impairments (Mobility)  much  more compliant today.  gait more functional with hand held assist,  pt has significant dementia  -SC       User Key  (r) = Recorded By, (t) = Taken By, (c) = Cosigned By    Initials Name Provider Type    SC Prerna Pelayo, PTA Physical Therapy Assistant        Mobility     Row Name 12/10/19 0915          Bed Mobility Assessment/Treatment    Supine-Sit [place] (Bed Mobility)  minimum assist (75% patient effort) minimal assist with initiation of task.  pt able to follow though with [facility]  -SC     Sit-Supine [place] (Bed Mobility)  minimum assist (75% patient effort) pt had difficulty with direction during this task.    -SC     Assistive Device (Bed Mobility)  bed rails  -SC     Comment (Bed Mobility)  needs extra time  -SC     Row Name 12/10/19 0915          Transfer Assessment/Treatment    Comment (Transfers)  sit to stand from eob.   -SC     Row Name 12/10/19 0915          Bed-Chair Transfer    Bed-Chair [place] (Transfers)  minimum assist (75% patient effort);other (see comments);verbal cues  -SC     Assistive Device (Bed-Chair Transfers)  -- hand held assist  -SC     Row Name 12/10/19 0915          Sit-Stand Transfer    Sit-Stand [place] (Transfers)  contact guard;verbal cues;set up one step command  -SC     Assistive Device (Sit-Stand Transfers)  other (see comments) HHA  -SC     Row " Name 12/10/19 0915          Gait/Stairs Assessment/Training    Gait/Stairs Assessment/Training  gait/ambulation independence  -SC     [place] Level (Gait)  minimum assist (75% patient effort);verbal cues;other (see comments) pt did well with simple instruction and talk about other things during gait traininig  -SC     Assistive Device (Gait)  -- HHA (much more functional at this time)  -SC     Distance in Feet (Gait)  150' (returned to room to rest) and then about 100'  -SC     Pattern (Gait)  step-through  -SC     Deviations/Abnormal Patterns (Gait)  base of support, narrow fair foot clearance.  without hha pt reaching for things.    a few times pt seeing a bug on the floor and went to smash it with his foot  -SC     Comment (Gait/Stairs)  pt does need guidance and followed simple direction better today  -SC       User Key  (r) = Recorded By, (t) = Taken By, (c) = Cosigned By    Initials Name Provider Type    SC Prerna Pelayo, PTA Physical Therapy Assistant        Obj/Interventions     Row Name 12/10/19 0925          Static Sitting Balance    Level of [place] (Unsupported Sitting, Static Balance)  standby assist  -SC     Sitting Position (Unsupported Sitting, Static Balance)  sitting on edge of bed  -SC     Time Able to Maintain Position (Unsupported Sitting, Static Balance)  3 to 4 minutes  -SC     Row Name 12/10/19 0925          Static Standing Balance    Level of [place] (Supported Standing, Static Balance)  contact guard assist  -SC     Time Able to Maintain Position (Supported Standing, Static Balance)  1 to 2 minutes  -SC     Assistive Device Utilized (Supported Standing, Static Balance)  other (see comments) HHA  -SC     Row Name 12/10/19 0925          Dynamic Standing Balance    Level of [place], Reaches Outside Midline (Standing, Dynamic Balance)  minimal assist, 75% patient effort  -SC     Time Able to Maintain Position, Reaches Outside Midline (Standing, Dynamic Balance)  3 to 4  minutes  -SC     Assistive Device Utilized (Supported Standing, Dynamic Balance)  -- HHA  -SC       User Key  (r) = Recorded By, (t) = Taken By, (c) = Cosigned By    Initials Name Provider Type    Prerna Byrne PTA Physical Therapy Assistant        Goals/Plan    No documentation.       Clinical Impression     Row Name 12/10/19 0926          Pain Assessment    Additional Documentation  Pain Scale: FACES Pre/Post-Treatment (Group)  -[facility] Name 12/10/19 0926          Pain Scale: FACES Pre/Post-Treatment    Pain: FACES Scale, Pretreatment  0-->no hurt  -SC     Pain: FACES Scale, Post-Treatment  0-->no hurt  -SC     Pre/Post Treatment Pain Comment  no signs or symptoms of pain during tx session  -SC     Row Name 12/10/19 0926          Vital Signs    O2 Delivery Pre Treatment  room air  -[facility] Name 12/10/19 0926          Positioning and Restraints    Pre-Treatment Position  in bed  -SC     Post Treatment Position  bed  -SC     In Bed  notified nsg;supine;call light within reach;exit alarm on sitter  -SC       User Key  (r) = Recorded By, (t) = Taken By, (c) = Cosigned By    Initials Name Provider Type    Prerna Byrne PTA Physical Therapy Assistant        Outcome Measures     Row Name 12/10/19 0928          How much help from another person do you currently need...    Turning from your back to your side while in flat bed without using bedrails?  3  -SC     Moving from lying on back to sitting on the side of a flat bed without bedrails?  3  -SC     Moving to and from a bed to a chair (including a wheelchair)?  3  -SC     Standing up from a chair using your arms (e.g., wheelchair, bedside chair)?  3  -SC     Climbing 3-5 steps with a railing?  2  -SC     To walk in hospital room?  3  -SC     AM-PAC 6 Clicks Score (PT)  17  -SC     Row Name 12/10/19 0928          Functional Assessment    Outcome Measure Options  AM-PAC 6 Clicks Basic Mobility (PT)  -SC       User Key  (r) = Recorded By, (t) = Taken  By, (c) = Cosigned By    Initials Name Provider Type    Prerna Byrne PTA Physical Therapy Assistant          PT Recommendation and Plan     Outcome Summary/Treatment Plan (PT)  Anticipated Discharge Disposition (PT): inpatient rehabilitation facility     Time Calculation:   PT Charges     Row Name 12/10/19 0944             Time Calculation    Start Time  0930  -SC      Stop Time  0850  -SC      Time Calculation (min)  1400 min  -SC      PT Received On  12/10/19  -SC      PT - Next Appointment  12/12/19  -SC         Time Calculation- PT    Total Timed Code Minutes- PT  20 minute(s)  -SC         Timed Charges    33811 - Gait Training Minutes   10  -SC      73790 - PT Therapeutic Activity Minutes  10  -SC        User Key  (r) = Recorded By, (t) = Taken By, (c) = Cosigned By    Initials Name Provider Type    Prerna Byrne PTA Physical Therapy Assistant        Therapy Charges for Today     Code Description Service Date Service Provider Modifiers Qty    68794761032 HC GAIT TRAINING EA 15 MIN 12/10/2019 Prerna Pelayo PTA GP 1    92189016537 HC PT THERAPEUTIC ACT EA 15 MIN 12/10/2019 Prerna Pelayo PTA GP 1          PT G-Codes  Outcome Measure Options: AM-PAC 6 Clicks Basic Mobility (PT)  AM-PAC 6 Clicks Score (PT): 17    Prerna Pelayo PTA  12/10/2019

## 2019-12-10 NOTE — PROGRESS NOTES
Continued Stay Note   [facility]     Patient Name: Yuriy Bennett  MRN: [id number]  Today's Date: 12/10/2019    Admit Date: 12/1/2019    Discharge Plan     Row Name 12/10/19 1446       Plan    Plan  DC Plan: Referral to [facility] ([place], [place]).-pending acceptance. PASRR approved. Precert required.     Plan Comments  SW spoke to pt spouse again today regarding ECF choice. Pt spouse requests [facility]. SW left ECF choice list in patient room for pt spouse to consider various facilities in [place], [place]. Liason from [facility] is reviewing patient.         Discharge Codes    No documentation.       Expected Discharge Date and Time     Expected Discharge Date Expected Discharge Time    Dec 9, 2019           SAILAJA Moreno    Phone # [phone]  Cell #[phone]  Fax#[phone]  [email or web address]    SAILAJA Moreno

## 2019-12-10 NOTE — PLAN OF CARE
Pt much more compliant today and following simple direction better.  Pt has some significant cognitive deficits that most likely won't allow pt to remember any direction.  This puts pt in a high risk for falling as pt unable to recognize his surroundings without 24/7 assist.  Pt does demonstrate some potential to become stronger with cont physical therapy to improve his balance.   Recommend IP rehab at this time at d/c to allow pt to cont to become stronger and improve his balance and this will most likely affect his safety.

## 2019-12-10 NOTE — PROGRESS NOTES
[facility] Medicine Services Daily Progress Note      Hospitalist Team  LOS 9 days      Patient Care Team:  Carolina Tran FNP as PCP - General (Family Medicine)    Patient Location: 232/1      Subjective   Subjective     Chief Complaint / Subjective  Chief Complaint   Patient presents with   • Altered Mental Status       HPI limited with confusion and dementia. No family in room. Awake and eating lunch. Nurse reports one episode increased confusion this am but then has been stable thereafter.     Brief Synopsis of Hospital Course/HPI  79-year-old male sent in from a mini home after he was noted to have altered mental status after lunch.  The patient apparently had been evaluated 10 days ago for worsening confusion and delirium but had a negative work-up at that time.  His blood sugar was reportedly normal.  There is no documented elevation of fever or chills although the patient's significant other noticed that he felt warm.  The patient apparently has recently been started on valproic acid for dementia features and no recent levels were reported.  Patient also apparently receives lorazepam for generalized anxiety     History from the wife.  She was generally unsatisfied with care at the psych inpatient facility.  Unknown why he was admitted over there.  Prior to that he was in nursing home and before that he was at [facility].  She states that he has not been home in about 2 months.  Mostly bedbound.  Needs plenty of help.  Known history of prostate cancer in the past with seed implantation.          Review of Systems   Unable to perform ROS: dementia         Objective  oriented to person and place but not month. Await psych placement  Objective      Vital Signs  Temp:  [97.5 °F (36.4 °C)-97.7 °F (36.5 °C)] 97.5 °F (36.4 °C)  Heart Rate:  [72-90] 75  Resp:  [16-17] 16  BP: (156-157)/(80-87) 157/87  Oxygen Therapy  SpO2: 98 %  Pulse Oximetry Type: Intermittent  Device (Oxygen  "Therapy): room air  Flow (L/min): 3  Flowsheet Rows      First Filed Value   Admission Height  182.9 cm (72\") Documented at 12/01/2019 1411   Admission Weight  79.4 kg (175 lb) Documented at 12/01/2019 1411        Intake & Output (last 3 days)       12/07 0701 - 12/08 0700 12/08 0701 - 12/09 0700 12/09 0701 - 12/10 0700    P.O.     Total Intake(mL/kg) 480 (8) 720 (12) 1055 (17.6)    Net +480 +720 +1055           Urine Unmeasured Occurrence 6 x 7 x 7 x    Stool Unmeasured Occurrence 3 x 2 x 0 x        Lines, Drains & Airways    Active LDAs     None                  Physical Exam:    Physical Exam   Constitutional: No distress.   HENT:   Head: Normocephalic.   Eyes: Pupils are equal, round, and reactive to light.   Pulmonary/Chest: Effort normal.   Diminished BS at the bases   Abdominal: Soft.   Neurological: He is alert.   Oriented x1   Psychiatric:   Flat affect, confused follows some commands   Vitals reviewed.        Results Review:     I reviewed the patient's new clinical results.      Lab Results (last 24 hours)     Procedure Component Value Units Date/Time    Comprehensive Metabolic Panel [[id number]]  (Abnormal) Collected:  12/10/19 0429    Specimen:  Blood Updated:  12/10/19 0540     Glucose 82 mg/dL      BUN 15 mg/dL      Creatinine 0.70 mg/dL      Sodium 141 mmol/L      Potassium 4.1 mmol/L      Chloride 103 mmol/L      CO2 29.0 mmol/L      Calcium 8.7 mg/dL      Total Protein 6.6 g/dL      Albumin 3.10 g/dL      ALT (SGPT) 10 U/L      AST (SGOT) 14 U/L      Alkaline Phosphatase 86 U/L      Total Bilirubin 0.2 mg/dL      eGFR Non African Amer 109 mL/min/1.73      Globulin 3.5 gm/dL      A/G Ratio 0.9 g/dL      BUN/Creatinine Ratio 21.4     Anion Gap 9.0 mmol/L     Narrative:       GFR Normal >60  Chronic Kidney Disease <60  Kidney Failure <15      CBC & Differential [[id number]] Collected:  12/10/19 0429    Specimen:  Blood Updated:  12/10/19 0513    Narrative:       The following orders were " created for panel order CBC & Differential.  Procedure                               Abnormality         Status                     ---------                               -----------         ------                     CBC Auto Differential[[id number]]        Abnormal            Final result                 Please view results for these tests on the individual orders.    CBC Auto Differential [[id number]]  (Abnormal) Collected:  12/10/19 0429    Specimen:  Blood Updated:  12/10/19 0513     WBC 10.30 10*3/mm3      RBC 3.91 10*6/mm3      Hemoglobin 12.2 g/dL      Hematocrit 36.7 %      MCV 93.9 fL      MCH 31.1 pg      MCHC 33.1 g/dL      RDW 16.1 %      RDW-SD 52.5 fl      MPV 6.7 fL      Platelets 493 10*3/mm3      Neutrophil % 61.3 %      Lymphocyte % 24.0 %      Monocyte % 10.6 %      Eosinophil % 3.4 %      Basophil % 0.7 %      Neutrophils, Absolute 6.30 10*3/mm3      Lymphocytes, Absolute 2.50 10*3/mm3      Monocytes, Absolute 1.10 10*3/mm3      Eosinophils, Absolute 0.30 10*3/mm3      Basophils, Absolute 0.10 10*3/mm3      nRBC 0.0 /100 WBC     POC Glucose Once [[id number]]  (Abnormal) Collected:  12/09/19 2012    Specimen:  Blood Updated:  12/09/19 2028     Glucose 223 mg/dL      Comment: Serial Number: [id number]:  [id number]       POC Glucose Once [[id number]]  (Abnormal) Collected:  12/09/19 1645    Specimen:  Blood Updated:  12/09/19 1654     Glucose 306 mg/dL      Comment: Serial Number: [id number]:  [id number]       POC Glucose Once [[id number]]  (Abnormal) Collected:  12/09/19 1043    Specimen:  Blood Updated:  12/09/19 1046     Glucose 133 mg/dL      Comment: Serial Number: [id number]:  [id number]       POC Glucose Once [[id number]]  (Abnormal) Collected:  12/09/19 0707    Specimen:  Blood Updated:  12/09/19 0708     Glucose 120 mg/dL      Comment: Serial Number: [id number]:  [id number]           No results found for: HGBA1C                Microbiology Results (last 10 days)      Procedure Component Value - Date/Time    Respiratory Panel, PCR - Swab, Nasopharynx [[id number]]  (Abnormal) Collected:  12/01/19 2029    Lab Status:  Final result Specimen:  Swab from Nasopharynx Updated:  12/01/19 2154     ADENOVIRUS, PCR Not Detected     Coronavirus 229E Not Detected     Coronavirus HKU1 Not Detected     Coronavirus NL63 Not Detected     Coronavirus OC43 Not Detected     Human Metapneumovirus Detected     Human Rhinovirus/Enterovirus Not Detected     Influenza B PCR Not Detected     Parainfluenza Virus 1 Not Detected     Parainfluenza Virus 2 Not Detected     Parainfluenza Virus 3 Not Detected     Parainfluenza Virus 4 Not Detected     Bordetella pertussis pcr Not Detected     Influenza A H1 2009 PCR Not Detected     Chlamydophila pneumoniae PCR Not Detected     Mycoplasma pneumo by PCR Not Detected     Influenza A PCR Not Detected     Influenza A H3 Not Detected     Influenza A H1 Not Detected     RSV, PCR Not Detected    Urine Culture - Urine, Urine, Catheter [[id number]]  (Abnormal) Collected:  12/01/19 1702    Lab Status:  Final result Specimen:  Urine, Catheter Updated:  12/02/19 1328     Urine Culture Yeast isolated     Comment: No further work up.       Blood Culture - Blood, Arm, Right [[id number]] Collected:  12/01/19 1449    Lab Status:  Final result Specimen:  Blood from Arm, Right Updated:  12/06/19 1500     Blood Culture No growth at 5 days    Blood Culture - Blood, Arm, Left [[id number]] Collected:  12/01/19 1436    Lab Status:  Final result Specimen:  Blood from Arm, Left Updated:  12/06/19 1445     Blood Culture No growth at 5 days          ECG/EMG Results (most recent)     None                    No radiology results for the last 7 days    Xrays, labs reviewed personally by physician.    Medication Review:   I have reviewed the patient's current medication list      Scheduled Meds    amoxicillin-clavulanate 1 tablet Oral Q12H   Divalproex Sodium 250 mg Oral Q8H   famotidine 40 mg  Oral BID   fluconazole 200 mg Oral Q24H   folic acid 1 mg Oral Daily   heparin (porcine) 5,000 Units Subcutaneous Q12H   insulin glargine 10 Units Subcutaneous QAM   insulin lispro 0-9 Units Subcutaneous 4x Daily With Meals & Nightly   LORazepam 1 mg Oral Nightly   metoprolol tartrate 12.5 mg Oral BID   sodium chloride 10 mL Intravenous Q12H   thiamine 100 mg Oral Daily       Meds Infusions       Meds PRN  •  acetaminophen **OR** acetaminophen **OR** acetaminophen  •  acetaminophen  •  bisacodyl  •  dextrose  •  dextrose  •  glucagon (human recombinant)  •  haloperidol lactate  •  insulin lispro **AND** insulin lispro  •  lactated ringers  •  polyethylene glycol  •  prochlorperazine  •  QUEtiapine  •  sodium chloride  •  sodium chloride    I personally reviewed patient's EKG    Assessment/Plan Humanmetapneumo virus infection  Yeast UTI- start Diflucan  Assessment/Plan     Active Hospital Problems:  Toxic metabolic encephalopathy  D/t PNA and sepsis  tx underlying causes    Slow to improve    Dementia with behavioral disturbance (CMS/HCC)  Psych following  Continue medication regimen      Cellulitis of right lower extremity- (present on admission)  Improving abx as above    Primary hypertension- (present on admission)  Continue home medications      HCAP (healthcare-associated pneumonia)- (present on admission)  Continue Augmentin for course   Supportive treatment  Cx negative     Uncontrolled type 2 diabetes mellitus with hyperglycemia (CMS/HCC)- (present on admission)  BG stable no hypoglycemic episodes now  Stopped  pre-meal , continue basal  SSI and follow BG             VTE Prophylaxis - heparin.      Code Status -   Code Status and Medical Interventions:   Ordered at: 12/01/19 1925     Code Status:    CPR     Medical Interventions (Level of Support Prior to Arrest):    Full       Discharge Planning    Was from In behavioral health unit but wife does not want to return to that facility, rehab placement  unsuccessful, now pending other inpatient psych options or and further discharge plan.  following    Electronically signed by Doug Diaz Jr., MD, 12/10/19, 6:54 AM.  [facility]ist Team

## 2019-12-10 NOTE — PLAN OF CARE
Problem: Fall Risk (Adult)  Goal: Identify Related Risk Factors and Signs and Symptoms  Outcome: Ongoing (interventions implemented as appropriate)  Flowsheets  Taken 12/7/2019 0451 by Aurora Kenney LPN  Related Risk Factors (Fall Risk): age-related changes;confusion/agitation;environment unfamiliar  Taken 12/6/2019 0215 by Maria C Soria RN  Signs and Symptoms (Fall Risk): presence of risk factors  Goal: Absence of Fall  Outcome: Ongoing (interventions implemented as appropriate)  Flowsheets (Taken 12/10/2019 1314)  Absence of Fall: making progress toward outcome     Problem: Patient Care Overview  Goal: Plan of Care Review  Outcome: Ongoing (interventions implemented as appropriate)  Flowsheets (Taken 12/10/2019 0701)  Plan of Care Reviewed With: patient  Goal: Individualization and Mutuality  Outcome: Ongoing (interventions implemented as appropriate)  Goal: Discharge Needs Assessment  Outcome: Ongoing (interventions implemented as appropriate)  Flowsheets (Taken 12/2/2019 1402 by Suzi Khoury RN)  Concerns to be Addressed: care coordination/care conferences  Readmission Within the Last 30 Days: no previous admission in last 30 days  Patient/Family Anticipates Transition to: inpatient rehabilitation facility  Goal: Interprofessional Rounds/Family Conf  Outcome: Ongoing (interventions implemented as appropriate)  Flowsheets (Taken 12/10/2019 1314)  Participants: ; family; nursing; patient; physician; social work/services     Problem: Skin Injury Risk (Adult)  Goal: Identify Related Risk Factors and Signs and Symptoms  Outcome: Ongoing (interventions implemented as appropriate)  Flowsheets (Taken 12/7/2019 0451 by Aurora Kenney LPN)  Related Risk Factors (Skin Injury Risk): advanced age;hospitalization prolonged;mobility impaired;nutritional deficiencies  Goal: Skin Health and Integrity  Outcome: Ongoing (interventions implemented as appropriate)  Flowsheets (Taken 12/10/2019  1314)  Skin Health and Integrity: making progress toward outcome     Problem: Pneumonia (Adult)  Goal: Signs and Symptoms of Listed Potential Problems Will be Absent, Minimized or Managed (Pneumonia)  Outcome: Ongoing (interventions implemented as appropriate)  Flowsheets (Taken 12/10/2019 1314)  Problems Assessed (Pneumonia): all  Problems Present (Pneumonia): none     Problem: Skin and Soft Tissue Infection (Adult)  Goal: Signs and Symptoms of Listed Potential Problems Will be Absent, Minimized or Managed (Skin and Soft Tissue Infection)  Outcome: Ongoing (interventions implemented as appropriate)  Flowsheets (Taken 12/10/2019 1314)  Problems Assessed (Skin and Soft Tissue Infection): all  Problems Present (Skin/Soft Tissue Inf): none

## 2019-12-11 NOTE — PROGRESS NOTES
[facility] Medicine Services Daily Progress Note      Hospitalist Team  LOS 10 days      Patient Care Team:  Carolina Tran FNP as PCP - General (Family Medicine)    Patient Location: 232/1      Subjective   Subjective     Chief Complaint / Subjective  Chief Complaint   Patient presents with   • Altered Mental Status       HPI limited with confusion and dementia. No family in room. Awake and eating lunch. Nurse reports one episode increased confusion this am but then has been stable thereafter.     Brief Synopsis of Hospital Course/HPI  79-year-old male sent in from a mini home after he was noted to have altered mental status after lunch.  The patient apparently had been evaluated 10 days ago for worsening confusion and delirium but had a negative work-up at that time.  His blood sugar was reportedly normal.  There is no documented elevation of fever or chills although the patient's significant other noticed that he felt warm.  The patient apparently has recently been started on valproic acid for dementia features and no recent levels were reported.  Patient also apparently receives lorazepam for generalized anxiety     History from the wife.  She was generally unsatisfied with care at the psych inpatient facility.  Unknown why he was admitted over there.  Prior to that he was in nursing home and before that he was at [facility].  She states that he has not been home in about 2 months.  Mostly bedbound.  Needs plenty of help.  Known history of prostate cancer in the past with seed implantation.          Review of Systems   Unable to perform ROS: dementia         Objective  oriented to person and place but not month. Await psych placement  Objective      Vital Signs  Temp:  [97.3 °F (36.3 °C)-97.8 °F (36.6 °C)] 97.5 °F (36.4 °C)  Heart Rate:  [60-80] 80  Resp:  [16-18] 18  BP: (149-170)/() 154/87  Oxygen Therapy  SpO2: 100 %  Pulse Oximetry Type: Intermittent  Device (Oxygen  "Therapy): room air  Flow (L/min): 3  Flowsheet Rows      First Filed Value   Admission Height  182.9 cm (72\") Documented at 12/01/2019 1411   Admission Weight  79.4 kg (175 lb) Documented at 12/01/2019 1411        Intake & Output (last 3 days)       12/08 0701 - 12/09 0700 12/09 0701 - 12/10 0700 12/10 0701 - 12/11 0700 12/11 0701 - 12/12 0700    P.O. 720 1055 600     Total Intake(mL/kg) 720 (12) 1055 (17.6) 600 (10)     Net +720 +1055 +600             Urine Unmeasured Occurrence 7 x 7 x 3 x     Stool Unmeasured Occurrence 2 x 0 x 1 x         Lines, Drains & Airways    Active LDAs     None                  Physical Exam:    Physical Exam   Constitutional: No distress.   HENT:   Head: Normocephalic.   Eyes: Pupils are equal, round, and reactive to light.   Pulmonary/Chest: Effort normal.   Diminished BS at the bases   Abdominal: Soft.   Neurological: He is alert.   Oriented x1   Psychiatric:   Flat affect, confused follows some commands   Vitals reviewed.        Results Review:     I reviewed the patient's new clinical results.      Lab Results (last 24 hours)     Procedure Component Value Units Date/Time    POC Glucose Once [[id number]]  (Abnormal) Collected:  12/10/19 2054    Specimen:  Blood Updated:  12/10/19 2056     Glucose 222 mg/dL      Comment: Serial Number: [id number]:  [id number]       POC Glucose Once [[id number]]  (Abnormal) Collected:  12/10/19 1629    Specimen:  Blood Updated:  12/10/19 1635     Glucose 216 mg/dL      Comment: Serial Number: [id number]:  [id number]       POC Glucose Once [[id number]]  (Abnormal) Collected:  12/10/19 1146    Specimen:  Blood Updated:  12/10/19 1159     Glucose 183 mg/dL      Comment: Serial Number: [id number]:  [id number]       POC Glucose Once [[id number]]  (Normal) Collected:  12/10/19 0751    Specimen:  Blood Updated:  12/10/19 0752     Glucose 102 mg/dL      Comment: Serial Number: [id number]:  [id number]           No results found for: HGBA1C   "              Microbiology Results (last 10 days)     Procedure Component Value - Date/Time    Respiratory Panel, PCR - Swab, Nasopharynx [[id number]]  (Abnormal) Collected:  12/01/19 2029    Lab Status:  Final result Specimen:  Swab from Nasopharynx Updated:  12/01/19 2154     ADENOVIRUS, PCR Not Detected     Coronavirus 229E Not Detected     Coronavirus HKU1 Not Detected     Coronavirus NL63 Not Detected     Coronavirus OC43 Not Detected     Human Metapneumovirus Detected     Human Rhinovirus/Enterovirus Not Detected     Influenza B PCR Not Detected     Parainfluenza Virus 1 Not Detected     Parainfluenza Virus 2 Not Detected     Parainfluenza Virus 3 Not Detected     Parainfluenza Virus 4 Not Detected     Bordetella pertussis pcr Not Detected     Influenza A H1 2009 PCR Not Detected     Chlamydophila pneumoniae PCR Not Detected     Mycoplasma pneumo by PCR Not Detected     Influenza A PCR Not Detected     Influenza A H3 Not Detected     Influenza A H1 Not Detected     RSV, PCR Not Detected    Urine Culture - Urine, Urine, Catheter [[id number]]  (Abnormal) Collected:  12/01/19 1702    Lab Status:  Final result Specimen:  Urine, Catheter Updated:  12/02/19 1328     Urine Culture Yeast isolated     Comment: No further work up.       Blood Culture - Blood, Arm, Right [[id number]] Collected:  12/01/19 1449    Lab Status:  Final result Specimen:  Blood from Arm, Right Updated:  12/06/19 1500     Blood Culture No growth at 5 days    Blood Culture - Blood, Arm, Left [[id number]] Collected:  12/01/19 1436    Lab Status:  Final result Specimen:  Blood from Arm, Left Updated:  12/06/19 1445     Blood Culture No growth at 5 days          ECG/EMG Results (most recent)     None                    No radiology results for the last 7 days    Xrays, labs reviewed personally by physician.    Medication Review:   I have reviewed the patient's current medication list      Scheduled Meds    Divalproex Sodium 250 mg Oral Q8H   famotidine  40 mg Oral BID   fluconazole 200 mg Oral Q24H   folic acid 1 mg Oral Daily   heparin (porcine) 5,000 Units Subcutaneous Q12H   insulin glargine 10 Units Subcutaneous QAM   insulin lispro 0-9 Units Subcutaneous 4x Daily With Meals & Nightly   LORazepam 1 mg Oral Nightly   metoprolol tartrate 12.5 mg Oral BID   sodium chloride 10 mL Intravenous Q12H   thiamine 100 mg Oral Daily       Meds Infusions       Meds PRN  •  acetaminophen **OR** acetaminophen **OR** acetaminophen  •  acetaminophen  •  bisacodyl  •  dextrose  •  dextrose  •  glucagon (human recombinant)  •  haloperidol lactate  •  insulin lispro **AND** insulin lispro  •  lactated ringers  •  polyethylene glycol  •  prochlorperazine  •  QUEtiapine  •  sodium chloride  •  sodium chloride    I personally reviewed patient's EKG    Assessment/Plan Humanmetapneumo virus infection  Yeast UTI- start Diflucan  Assessment/Plan     Active Hospital Problems:  Toxic metabolic encephalopathy  D/t PNA and sepsis  tx underlying causes    Slow to improve    Dementia with behavioral disturbance (CMS/HCC)  Psych following  Continue medication regimen      Cellulitis of right lower extremity- (present on admission)  Improving abx as above    Primary hypertension- (present on admission)  Continue home medications      HCAP (healthcare-associated pneumonia)- (present on admission)  Continue Augmentin for course   Supportive treatment  Cx negative     Uncontrolled type 2 diabetes mellitus with hyperglycemia (CMS/HCC)- (present on admission)  BG stable no hypoglycemic episodes now  Stopped  pre-meal , continue basal  SSI and follow BG             VTE Prophylaxis - heparin.      Code Status -   Code Status and Medical Interventions:   Ordered at: 12/01/19 1925     Code Status:    CPR     Medical Interventions (Level of Support Prior to Arrest):    Full       Discharge Planning    Was from In behavioral health unit but wife does not want to return to that facility, rehab placement  unsuccessful, now pending other inpatient psych options or and further discharge plan.  following  Await placement    Electronically signed by Doug Diaz Jr., MD, 12/11/19, 7:01 AM.  [facility] Hospitalist Team

## 2019-12-11 NOTE — PROGRESS NOTES
Continued Stay Note  AMAYA Pittman     Patient Name: Yuriy Bennett  MRN: [id number]  Today's Date: 12/11/2019    Admit Date: 12/1/2019    Discharge Plan     Row Name 12/11/19 1339       Plan    Plan  [facility]ASMO Plan: [facility] ([place], [place]) accepted-pending precert. PASRR approved. Precert started 12/11. Pt must be sitter free and NO IM psych medications.         Discharge Codes    No documentation.       Expected Discharge Date and Time     Expected Discharge Date Expected Discharge Time    Dec 9, 2019         SAILAJA Moreno    Phone # [phone]  Cell #[phone]  Fax#[phone]  [email or web address]      SAILAJA Moreno

## 2019-12-11 NOTE — THERAPY TREATMENT NOTE
Acute Care - Occupational Therapy Treatment Note  AMAYA Pittman     Patient Name: Yuriy Bennett  : 1940  MRN: [id number]  Today's Date: 2019             Admit Date: 2019       ICD-10-CM ICD-9-CM   1. Pneumonia of right lower lobe due to infectious organism (CMS/HCC) J18.1 486   2. Delirium due to another medical condition F05 293.0   3. Alzheimer's dementia with behavioral disturbance, unspecified timing of dementia onset (CMS/HCC) G30.9 331.0    F02.81 294.11   4. Sepsis without acute organ dysfunction, due to unspecified organism (CMS/HCC) A41.9 038.9     995.91   5. Mass of left chest wall R22.2 786.6     Patient Active Problem List   Diagnosis   • Cystitis   • Alzheimer's dementia with behavioral disturbance (CMS/HCC)   • Uncontrolled type 2 diabetes mellitus with hyperglycemia (CMS/HCC)   • H/O partial seizures   • History of prostate cancer   • Hyperlipidemia   • Noncompliance with medication regimen   • Nephrolithiasis   • Proteinuria   • HCAP (healthcare-associated pneumonia)   • Primary hypertension   • Cellulitis of right lower extremity   • Dementia with behavioral disturbance (CMS/HCC)   • Toxic metabolic encephalopathy     Past Medical History:   Diagnosis Date   • Alzheimer disease (CMS/HCC)    • Cancer (CMS/HCC)     prostate   • Diabetes mellitus (CMS/HCC)    • Hypertension      History reviewed. No pertinent surgical history.    Therapy Treatment    Rehabilitation Treatment Summary     Row Name 19 1500             Treatment Time/Intention    Discipline  occupational therapist  -      Document Type  therapy note (daily note)  -      Subjective Information  no complaints  -      Mode of Treatment  individual therapy  -      Patient/Family Observations  Pt supine, naked except soiled brief, finished eating in poor slumped position & thickened liquids are dribbled down chin & chest. Pt is agreeable & very pleasant & cooperative & would like to access bathroom. Pt has  dificulty folowing one-step commands and demonstrates delayed initiation & termination of tasks. HE perseverates on hand-washing motions throughout oral care task & shower. He dribbles urine continuously.   -      Patient Effort  good  -      Comment  Pt is moved across from RN station to be in sight.  -      Recorded by [] Jennifer Bass OT 12/11/19 1542      Row Name 12/11/19 1500             Cognitive Assessment/Intervention- PT/OT    Orientation Status (Cognition)  oriented to;person  -      Recorded by [] Jennifer Bass, OT 12/11/19 1542      Row Name 12/11/19 1500             Cognitive Assessment Intervention- SLP    Cognitive Function (Cognition)  severe impairment  -      Thought Organization (Cognitive)  severe impairment  -      Problem Solving (Cognitive)  severe impairment  -      Executive Function (Cognition)  severe impairment  -      Cognition, Comment  Pt was better able this date to follow commands and to complete fucntional movements & tasks, but still severe impairment w/ function.  -      Recorded by [] Jennifer Bass, OT 12/11/19 1542      Row Name 12/11/19 1500             Bed Mobility Assessment/Treatment    Bed Mobility Assessment/Treatment  supine-sit  -      Supine-Sit [place] (Bed Mobility)  minimum assist (75% patient effort)  -      Recorded by [] Jennifer Bass, OT 12/11/19 1542      Row Name 12/11/19 1500             Functional Mobility    Functional Mobility- Ind. Level  minimum assist (75% patient effort)  -      Functional Mobility- Device  rolling walker  -      Recorded by [] Jennifer Bass OT 12/11/19 1542      Row Name 12/11/19 1500             Transfer Assessment/Treatment    Transfer Assessment/Treatment  shower transfer  -      Recorded by [] Jennifer Bass OT 12/11/19 1542      Row Name 12/11/19 1500             Shower Transfer    Type (Shower Transfer)  lateral  -      [place] Level (Shower Transfer)  moderate assist  (50% patient effort) strong tactile cueing is effective  -      Recorded by [] Jennifer Bass, OT 12/11/19 1542      Row Name 12/11/19 1500             ADL Assessment/Intervention    BADL Assessment/Intervention  bathing;lower body dressing;upper body dressing;grooming;toileting  -      Recorded by [] Jennifer Bass, OT 12/11/19 1542      Row Name 12/11/19 1500             Bathing Assessment/Intervention    Bathing [place] Level  bathing skills;dependent (less than 25% patient effort)  -      Bathing Position  supported standing  -      Recorded by [] Jennifer Bass, OT 12/11/19 1542      Row Name 12/11/19 1500             Upper Body Dressing Assessment/Training    Upper Body Dressing [place] Level  dependent (less than 25% patient effort)  -      Recorded by [] Jennifer Bass, OT 12/11/19 1542      Row Name 12/11/19 1500             Lower Body Dressing Assessment/Training    Lower Body Dressing [place] Level  dependent (less than 25% patient effort)  -      Recorded by [] Jennifer Bass, OT 12/11/19 1542      Row Name 12/11/19 1500             Grooming Assessment/Training    [place] Level (Grooming)  oral care regimen;moderate assist (50% patient effort);hair care, combing/brushing;maximum assist (25% patient effort)  -      Recorded by [] Jennifer Bass, OT 12/11/19 1542      Row Name 12/11/19 1500             Toileting Assessment/Training    [place] Level (Toileting)  dependent (less than 25% patient effort)  -      Assistive Devices (Toileting)  -- incontinent. Uses brief'  -      Recorded by [] Jennifer Bass, OT 12/11/19 1542      Row Name 12/11/19 1500             BADL Safety/Performance    Skilled BADL Treatment/Intervention  hand-over-hand training/cues;cognitive/safety deficit modifications  -      Recorded by [] Jennifer Bass, OT 12/11/19 1542      Row Name 12/11/19 1500             Positioning and Restraints    Pre-Treatment Position  in bed   -MH      Post Treatment Position  chair  -MH      In Bed  notified nsg;call light within reach;encouraged to call for assist;exit alarm on;patient within staff view;sitting  -MH      Recorded by [] Jennifer Bass OT 12/11/19 1542      Row Name 12/11/19 1500             Pain Scale: FACES Pre/Post-Treatment    Pain: FACES Scale, Pretreatment  0-->no hurt  -MH      Pain: FACES Scale, Post-Treatment  0-->no hurt  -MH      Recorded by [] Jnenifer Bass OT 12/11/19 1542      Row Name 12/11/19 1500             Plan of Care Review    Plan of Care Reviewed With  patient  -MH      Progress  improving  -MH      Recorded by [] Jennifer Bass OT 12/11/19 1542        User Key  (r) = Recorded By, (t) = Taken By, (c) = Cosigned By    Initials Name Effective Dates Discipline     Jennifer Bass OT 03/01/19 -  OT                 OT Recommendation and Plan     Plan of Care Review  Plan of Care Reviewed With: patient  Plan of Care Reviewed With: patient  Outcome Measures     Row Name 12/11/19 1500             How much help from another person do you currently need...    Turning from your back to your side while in flat bed without using bedrails?  4  -MH      Moving from lying on back to sitting on the side of a flat bed without bedrails?  3  -MH      Moving to and from a bed to a chair (including a wheelchair)?  3  -MH      Standing up from a chair using your arms (e.g., wheelchair, bedside chair)?  3  -MH      Climbing 3-5 steps with a railing?  2  -MH      To walk in hospital room?  3  -MH      AM-PAC 6 Clicks Score (PT)  18  -MH        User Key  (r) = Recorded By, (t) = Taken By, (c) = Cosigned By    Initials Name Provider Type    Jennifer Melvin OT Occupational Therapist           Time Calculation:   Time Calculation- OT     Row Name 12/11/19 1545             Time Calculation- OT    OT Start Time  1400  -      OT Stop Time  1450  -      OT Time Calculation (min)  50 min  -      Total Timed Code Minutes- OT  50  minute(s)  -      OT Received On  12/11/19  -      OT - Next Appointment  12/13/19  -        User Key  (r) = Recorded By, (t) = Taken By, (c) = Cosigned By    Initials Name Provider Type     Jennifer Bass OT Occupational Therapist        Therapy Charges for Today     Code Description Service Date Service Provider Modifiers Qty    18331006090 HC OT DEV OF COGN SKILLS EACH 15 MIN 12/11/2019 Jennifer Bass OT  1    [id number]  OT NEUROMUSC RE EDUCATION EA 15 MIN 12/11/2019 Jennifer Bass OT GO 1    [id number]  OT SELF CARE/MGMT/TRAIN EA 15 MIN 12/11/2019 Jennifer Bass OT GO 1    [id number]  OT THERAPEUTIC ACT EA 15 MIN 12/11/2019 Jennifer Bass OT GO 1               Jennifer Bass OT  12/11/2019

## 2019-12-11 NOTE — PLAN OF CARE
Pt has slightly improved praxia & ability to follow directional commands & commands for functional task/ADL completion; however, he remains severely confused & unable to complete self care without mod to dependent assist. He stands well and has fair activity tolerance. Min (A) for basic mobility still required. IP rehab at d/c will benefit his strength though he appears to approach baseline.

## 2019-12-11 NOTE — PLAN OF CARE
Problem: Patient Care Overview  Goal: Plan of Care Review  Outcome: Ongoing (interventions implemented as appropriate)  Flowsheets  Taken 12/10/2019 1901  Plan of Care Reviewed With: patient  Taken 12/11/2019 0450  Outcome Summary: Patient more agitated this evening took night time meds and eventually went to sleep

## 2019-12-12 NOTE — PLAN OF CARE
Problem: Fall Risk (Adult)  Goal: Identify Related Risk Factors and Signs and Symptoms  Outcome: Ongoing (interventions implemented as appropriate)  Flowsheets (Taken 12/7/2019 7971 by Aurora Kenney LPN)  Related Risk Factors (Fall Risk): age-related changes;confusion/agitation;environment unfamiliar  Note:   Patient very cooperative and resting well.  Medications given

## 2019-12-12 NOTE — PROGRESS NOTES
[facility] Medicine Services Daily Progress Note      Hospitalist Team  LOS 11 days      Patient Care Team:  Carolina Tran FNP as PCP - General (Family Medicine)    Patient Location: 234/1      Subjective   Subjective     Chief Complaint / Subjective  Chief Complaint   Patient presents with   • Altered Mental Status       HPI limited with confusion and dementia. No family in room. Awake and eating lunch. Nurse reports one episode increased confusion this am but then has been stable thereafter.     Brief Synopsis of Hospital Course/HPI  79-year-old male sent in from a mini home after he was noted to have altered mental status after lunch.  The patient apparently had been evaluated 10 days ago for worsening confusion and delirium but had a negative work-up at that time.  His blood sugar was reportedly normal.  There is no documented elevation of fever or chills although the patient's significant other noticed that he felt warm.  The patient apparently has recently been started on valproic acid for dementia features and no recent levels were reported.  Patient also apparently receives lorazepam for generalized anxiety     History from the wife.  She was generally unsatisfied with care at the psych inpatient facility.  Unknown why he was admitted over there.  Prior to that he was in nursing home and before that he was at [facility].  She states that he has not been home in about 2 months.  Mostly bedbound.  Needs plenty of help.  Known history of prostate cancer in the past with seed implantation.          Review of Systems   Unable to perform ROS: dementia         Objective  oriented to person and place but not month. Await precert  Objective      Vital Signs  Temp:  [97.4 °F (36.3 °C)-98 °F (36.7 °C)] 97.4 °F (36.3 °C)  Heart Rate:  [75-83] 76  Resp:  [16-18] 16  BP: (131-170)/(81-88) 137/81  Oxygen Therapy  SpO2: 98 %  Pulse Oximetry Type: Intermittent  Device (Oxygen Therapy): room  "air  Flow (L/min): 3  Flowsheet Rows      First Filed Value   Admission Height  182.9 cm (72\") Documented at 12/01/2019 1411   Admission Weight  79.4 kg (175 lb) Documented at 12/01/2019 1411        Intake & Output (last 3 days)       12/09 0701 - 12/10 0700 12/10 0701 - 12/11 0700 12/11 0701 - 12/12 0700 12/12 0701 - 12/13 0700    P.O. 3704 601 0047     Total Intake(mL/kg) 1055 (17.6) 600 (10) 1560 (26)     Net +1055 +600 +1560             Urine Unmeasured Occurrence 7 x 3 x 2 x     Stool Unmeasured Occurrence 0 x 1 x          Lines, Drains & Airways    Active LDAs     None                  Physical Exam:    Physical Exam   Constitutional: No distress.   HENT:   Head: Normocephalic.   Eyes: Pupils are equal, round, and reactive to light.   Pulmonary/Chest: Effort normal.   Diminished BS at the bases   Abdominal: Soft.   Neurological: He is alert.   Oriented x1   Psychiatric:   Flat affect, confused follows some commands   Vitals reviewed.        Results Review:     I reviewed the patient's new clinical results.      Lab Results (last 24 hours)     Procedure Component Value Units Date/Time    POC Glucose Once [[id number]]  (Abnormal) Collected:  12/12/19 0704    Specimen:  Blood Updated:  12/12/19 0705     Glucose 120 mg/dL      Comment: Serial Number: [id number]:  [id number]       POC Glucose Once [[id number]]  (Abnormal) Collected:  12/11/19 1938    Specimen:  Blood Updated:  12/1940     Glucose 293 mg/dL      Comment: Serial Number: [id number]:  [id number]       POC Glucose Once [[id number]]  (Abnormal) Collected:  12/11/19 1636    Specimen:  Blood Updated:  12/11/19 1639     Glucose 214 mg/dL      Comment: Serial Number: [id number]:  [id number]       POC Glucose Once [[id number]]  (Abnormal) Collected:  12/11/19 1109    Specimen:  Blood Updated:  12/11/19 1110     Glucose 171 mg/dL      Comment: Serial Number: [id number]:  [id number]           No results found for: HGBA1C          "       Microbiology Results (last 10 days)     ** No results found for the last 240 hours. **          ECG/EMG Results (most recent)     None                    No radiology results for the last 7 days    Xrays, labs reviewed personally by physician.    Medication Review:   I have reviewed the patient's current medication list      Scheduled Meds    Divalproex Sodium 250 mg Oral Q8H   famotidine 40 mg Oral BID   fluconazole 200 mg Oral Q24H   folic acid 1 mg Oral Daily   heparin (porcine) 5,000 Units Subcutaneous Q12H   insulin glargine 10 Units Subcutaneous QAM   insulin lispro 0-9 Units Subcutaneous 4x Daily With Meals & Nightly   LORazepam 1 mg Oral Nightly   metoprolol tartrate 12.5 mg Oral BID   sodium chloride 10 mL Intravenous Q12H   thiamine 100 mg Oral Daily       Meds Infusions       Meds PRN  •  acetaminophen **OR** acetaminophen **OR** acetaminophen  •  acetaminophen  •  bisacodyl  •  dextrose  •  dextrose  •  glucagon (human recombinant)  •  haloperidol lactate  •  insulin lispro **AND** insulin lispro  •  lactated ringers  •  polyethylene glycol  •  prochlorperazine  •  QUEtiapine  •  sodium chloride  •  sodium chloride    I personally reviewed patient's EKG    Assessment/Plan Humanmetapneumo virus infection  Yeast UTI- start Diflucan  Assessment/Plan     Active Hospital Problems:  Toxic metabolic encephalopathy  D/t PNA and sepsis  tx underlying causes    Slow to improve    Dementia with behavioral disturbance (CMS/HCC)  Psych following  Continue medication regimen      Cellulitis of right lower extremity- (present on admission)  Improving abx as above    Primary hypertension- (present on admission)  Continue home medications      HCAP (healthcare-associated pneumonia)- (present on admission)  Continue Augmentin for course   Supportive treatment  Cx negative     Uncontrolled type 2 diabetes mellitus with hyperglycemia (CMS/HCC)- (present on admission)  BG stable no hypoglycemic episodes now  Stopped   pre-meal , continue basal  SSI and follow BG             VTE Prophylaxis - heparin.      Code Status -   Code Status and Medical Interventions:   Ordered at: 12/01/19 1925     Code Status:    CPR     Medical Interventions (Level of Support Prior to Arrest):    Full       Discharge Planning    Was from In behavioral health unit but wife does not want to return to that facility, rehab placement unsuccessful, now pending other inpatient psych options or and further discharge plan.  following  Await placement    Electronically signed by Doug Diaz Jr., MD, 12/12/19, 7:25 AM.  [facility] Hospitalist Team

## 2019-12-12 NOTE — PROGRESS NOTES
Continued Stay Note  AMAYA Pittman     Patient Name: Yuriy Bennett  MRN: [id number]  Today's Date: 12/12/2019    Admit Date: 12/1/2019    Discharge Plan     Row Name 12/12/19 1600       Plan    Plan Comments  SW spoke to pt spouse on 12/12 who confirms that she is still ok with [place] [facility] placement. Frienship Bodega states they started precert and it is pending.         Discharge Codes    No documentation.       Expected Discharge Date and Time     Expected Discharge Date Expected Discharge Time    Dec 9, 2019         SAILAJA Moreno    Phone # [phone]  Cell #[phone]  Fax#[phone]  [email or web address]      SAILAJA Moreno

## 2019-12-12 NOTE — THERAPY TREATMENT NOTE
Patient Name: Yuriy Bennett  : 1940    MRN: [id number]                              Today's Date: 2019       Admit Date: 2019    Visit Dx:     ICD-10-CM ICD-9-CM   1. Pneumonia of right lower lobe due to infectious organism (CMS/[facility]) J18.1 486   2. Delirium due to another medical condition F05 293.0   3. Alzheimer's dementia with behavioral disturbance, unspecified timing of dementia onset (CMS/[facility]) G30.9 331.0    F02.81 294.11   4. Sepsis without acute organ dysfunction, due to unspecified organism (CMS/HCC) A41.9 038.9     995.91   5. Mass of left chest wall R22.2 786.6     Patient Active Problem List   Diagnosis   • Cystitis   • Alzheimer's dementia with behavioral disturbance (CMS/HCC)   • Uncontrolled type 2 diabetes mellitus with hyperglycemia (CMS/HCC)   • H/O partial seizures   • History of prostate cancer   • Hyperlipidemia   • Noncompliance with medication regimen   • Nephrolithiasis   • Proteinuria   • HCAP (healthcare-associated pneumonia)   • Primary hypertension   • Cellulitis of right lower extremity   • Dementia with behavioral disturbance (CMS/HCC)   • Toxic metabolic encephalopathy     Past Medical History:   Diagnosis Date   • Alzheimer disease (CMS/HCC)    • Cancer (CMS/HCC)     prostate   • Diabetes mellitus (CMS/[facility])    • Hypertension      History reviewed. No pertinent surgical history.  General Information     Row Name 19 1127          PT Evaluation Time/Intention    Document Type  therapy note (daily note)  -     Mode of Treatment  physical therapy  -     Row Name 19 1127          Cognitive Assessment/Intervention- PT/OT    Orientation Status (Cognition)  oriented to;person  -     Row Name 19 1127          Safety Issues, Functional Mobility    Safety Issues Affecting Function (Mobility)  ability to follow commands;awareness of need for assistance;insight into deficits/self awareness;sequencing abilities  -     Impairments Affecting Function  (Mobility)  cognition;balance;coordination  -       User Key  (r) = Recorded By, (t) = Taken By, (c) = Cosigned By    Initials Name Provider Type     Joselin Shaver PTA Physical Therapy Assistant        Mobility     [facility] Name 12/12/19 1127          Bed Mobility Assessment/Treatment    Bed Mobility Assessment/Treatment  bed mobility (all) activities  -     [place] Level (Bed Mobility)  verbal cues;moderate assist (50% patient effort)  -     Supine-Sit [place] (Bed Mobility)  moderate assist (50% patient effort);verbal cues  -     Sit-Supine [place] (Bed Mobility)  moderate assist (50% patient effort);verbal cues  -[facility] Name 12/12/19 1127          Sit-Stand Transfer    Sit-Stand [place] (Transfers)  minimum assist (75% patient effort);verbal cues  -     Assistive Device (Sit-Stand Transfers)  -- Therapist supported.  -[facility] Name 12/12/19 1127          Gait/Stairs Assessment/Training    Gait/Stairs Assessment/Training  gait/ambulation assistive device  -     [place] Level (Gait)  maximum assist (25% patient effort)  -     Assistive Device (Gait)  -- Therapist supported  -     Distance in Feet (Gait)  sidestepping 3' towards HOB  -     Comment (Gait/Stairs)  Unstable, high fall risk.  -       User Key  (r) = Recorded By, (t) = Taken By, (c) = Cosigned By    Initials Name Provider Type     Joselin Shaver PTA Physical Therapy Assistant        Obj/Interventions     [facility] Name 12/12/19 1130          Therapeutic Exercise    Comment (Therapeutic Exercise)  Seated aarom exercises in available planes.  -[facility] Name 12/12/19 1130          Static Sitting Balance    Level of [place] (Unsupported Sitting, Static Balance)  supervision  -[facility] Name 12/12/19 1130          Dynamic Sitting Balance    Level of [place], Reaches Outside Midline (Sitting, Dynamic Balance)  contact guard assist  -[facility] Name 12/12/19 1130          Static Standing Balance    Level  of [place] (Supported Standing, Static Balance)  moderate assist, 50 to 74% patient effort  -     Comment (Supported Standing, Static Balance)  -- Therapist supported.  -[facility] Name 12/12/19 1130          Dynamic Standing Balance    Level of [place], Reaches Outside Midline (Standing, Dynamic Balance)  maximal assist, 25 to 49% patient effort  -     Comment, Reaches Outside Midline (Standing, Dynamic Balance)  -- Poor righting recovery.  -       User Key  (r) = Recorded By, (t) = Taken By, (c) = Cosigned By    Initials Name Provider Type     Joselin Shaver PTA Physical Therapy Assistant        Goals/Plan    No documentation.       Clinical Impression     [facility] Name 12/12/19 1131          Pain Assessment    Additional Documentation  Pain Scale: Numbers Pre/Post-Treatment (Group)  -LH     Row Name 12/12/19 1131          Pain Scale: Numbers Pre/Post-Treatment    Pain Scale: Numbers, Pretreatment  0/10 - no pain  -     Pain Scale: Numbers, Post-Treatment  0/10 - no pain  -LH     Row Name 12/12/19 1131          Plan of Care Review    Progress  declining  -LH     Row Name 12/12/19 1131          Vital Signs    O2 Delivery Pre Treatment  room air  -     O2 Delivery Intra Treatment  room air  -     O2 Delivery Post Treatment  room air  -     Pre Patient Position  Supine  -     Intra Patient Position  Standing  -     Post Patient Position  Supine  -[facility] Name 12/12/19 1131          Positioning and Restraints    Pre-Treatment Position  in bed  -     Post Treatment Position  bed  -     In Bed  notified nsg;fowlers;call light within reach;exit alarm on  -       User Key  (r) = Recorded By, (t) = Taken By, (c) = Cosigned By    Initials Name Provider Type     Joselin Shaver PTA Physical Therapy Assistant        Outcome Measures     Row Name 12/12/19 1132          How much help from another person do you currently need...    Turning from your back to your side while in flat bed without  using bedrails?  4  -LH     Moving from lying on back to sitting on the side of a flat bed without bedrails?  2  -LH     Moving to and from a bed to a chair (including a wheelchair)?  2  -LH     Standing up from a chair using your arms (e.g., wheelchair, bedside chair)?  3  -LH     Climbing 3-5 steps with a railing?  1  -LH     To walk in hospital room?  1  -     AM-PAC 6 Clicks Score (PT)  13  -     Row Name 12/12/19 1132          Functional Assessment    Outcome Measure Options  AM-PAC 6 Clicks Basic Mobility (PT)  -       User Key  (r) = Recorded By, (t) = Taken By, (c) = Cosigned By    Initials Name Provider Type     Joselin Shaver PTA Physical Therapy Assistant          PT Recommendation and Plan     Outcome Summary/Treatment Plan (PT)  Anticipated Discharge Disposition (PT): inpatient rehabilitation facility  Plan of Care Reviewed With: patient  Progress: declining     Time Calculation:   PT Charges     Row Name 12/12/19 1136             Time Calculation    Start Time  1100  -      Stop Time  1116  -      Time Calculation (min)  16 min  -      PT Received On  12/12/19  -      PT - Next Appointment  12/14/19  -         Time Calculation- PT    Total Timed Code Minutes- PT  16 minute(s)  -         Timed Charges    84497 - PT Therapeutic Exercise Minutes  8  -      17997 - PT Therapeutic Activity Minutes  8  -        User Key  (r) = Recorded By, (t) = Taken By, (c) = Cosigned By    Initials Name Provider Type     Joselin Shaver PTA Physical Therapy Assistant        Therapy Charges for Today     Code Description Service Date Service Provider Modifiers Qty    27217178951 HC PT THERAPEUTIC ACT EA 15 MIN 12/12/2019 Joselin Shaver PTA GP 1    14156394952 HC PT THER PROC EA 15 MIN 12/12/2019 Joselin Shaver PTA GP 1          PT G-Codes  Outcome Measure Options: AM-PAC 6 Clicks Basic Mobility (PT)  AM-PAC 6 Clicks Score (PT): 13    Joselin Shaver PTA  12/12/2019

## 2019-12-12 NOTE — PLAN OF CARE
Requiring mod/max A for safety c transfers and wbing. Limited by decreased cognition, delayed responses, weakness and decreased motor planning. Unsafe for home alone, will need rehab at d/c to address deficits.

## 2019-12-13 NOTE — THERAPY TREATMENT NOTE
"Acute Care - Occupational Therapy Treatment Note   [facility]     Patient Name: Yuriy Bennett  : 1940  MRN: [id number]  Today's Date: 2019             Admit Date: 2019       ICD-10-CM ICD-9-CM   1. Pneumonia of right lower lobe due to infectious organism (CMS/HCC) J18.1 486   2. Delirium due to another medical condition F05 293.0   3. Alzheimer's dementia with behavioral disturbance, unspecified timing of dementia onset (CMS/HCC) G30.9 331.0    F02.81 294.11   4. Sepsis without acute organ dysfunction, due to unspecified organism (CMS/HCC) A41.9 038.9     995.91   5. Mass of left chest wall R22.2 786.6     Patient Active Problem List   Diagnosis   • Cystitis   • Alzheimer's dementia with behavioral disturbance (CMS/HCC)   • Uncontrolled type 2 diabetes mellitus with hyperglycemia (CMS/HCC)   • H/O partial seizures   • History of prostate cancer   • Hyperlipidemia   • Noncompliance with medication regimen   • Nephrolithiasis   • Proteinuria   • HCAP (healthcare-associated pneumonia)   • Primary hypertension   • Cellulitis of right lower extremity   • Dementia with behavioral disturbance (CMS/HCC)   • Toxic metabolic encephalopathy     Past Medical History:   Diagnosis Date   • Alzheimer disease (CMS/HCC)    • Cancer (CMS/HCC)     prostate   • Diabetes mellitus (CMS/HCC)    • Hypertension      History reviewed. No pertinent surgical history.    Therapy Treatment    Rehabilitation Treatment Summary     Row Name 19 1600             Treatment Time/Intention    Discipline  occupational therapist  -      Document Type  therapy note (daily note)  -      Subjective Information  no complaints \"Lots of surprises today.\" Spouse was visiting & he is lovin  -      Mode of Treatment  individual therapy  -      Patient/Family Observations  supine in diaper which is wet. Pt is very agreeable & wants to get up in the bathroom for oral care & toileting. Pt was not able to manage aiming his stream into " the toilet and required assist for LBB/LBD and was distractible. He did sit & comb his hair. OT allowed Pt w/ good upright endurance to remain on his feet for nearly the entire session, though he was not successfully completing functional tasks, his upright activity will increase his health & wellbeing & he was able to engage in storytelling w/ writer to describe spouse & his love for her. He will need nursing home care & IP OT/PT/ST at d/c.  -      Patient Effort  excellent  -      Recorded by [] Jennifer Bass OT 12/13/19 1615      Row Name 12/13/19 1600             Pain Scale: Numbers Pre/Post-Treatment    Pain Scale: Numbers, Pretreatment  0/10 - no pain  -      Pain Scale: Numbers, Post-Treatment  0/10 - no pain  -      Recorded by [] Jennifer Bass OT 12/13/19 1615      Row Name 12/13/19 1600             Plan of Care Review    Plan of Care Reviewed With  patient  -      Outcome Summary  Pt was happy & agreeabe to be up and active. He completed some functional tasks such as hair combing, towell folding, and story-telling. He has moderate difficulty with making clear concise descriptive statements. He needs CGA for balance & is not able to recall fall precautions. He has tendency to disrobe.  -      Recorded by [] Jennifer Bass OT 12/13/19 1615      Row Name 12/13/19 1600             Outcome Summary/Treatment Plan (OT)    Anticipated Discharge Disposition (OT)  inpatient rehabilitation facility  -      Recorded by [] Jennifer Bass OT 12/13/19 1617        User Key  (r) = Recorded By, (t) = Taken By, (c) = Cosigned By    Initials Name Effective Dates Discipline     Jennifer Bass OT 03/01/19 -  OT                 OT Recommendation and Plan  Outcome Summary/Treatment Plan (OT)  Anticipated Discharge Disposition (OT): inpatient rehabilitation facility  Plan of Care Review  Plan of Care Reviewed With: patient  Plan of Care Reviewed With: patient  Outcome Summary: Pt was happy & agreeabe  to be up and active. He completed some functional tasks such as hair combing, towell folding, and story-telling. He has moderate difficulty with making clear concise descriptive statements. He needs CGA for balance & is not able to recall fall precautions. He has tendency to disrobe.  Outcome Measures     Row Name 12/11/19 1500             How much help from another person do you currently need...    Turning from your back to your side while in flat bed without using bedrails?  4  -MH      Moving from lying on back to sitting on the side of a flat bed without bedrails?  3  -MH      Moving to and from a bed to a chair (including a wheelchair)?  3  -MH      Standing up from a chair using your arms (e.g., wheelchair, bedside chair)?  3  -MH      Climbing 3-5 steps with a railing?  2  -MH      To walk in hospital room?  3  -MH      AM-PAC 6 Clicks Score (PT)  18  -        User Key  (r) = Recorded By, (t) = Taken By, (c) = Cosigned By    Initials Name Provider Type     Jennifer Bass OT Occupational Therapist           Time Calculation:   Time Calculation- OT     Row Name 12/13/19 1616             Time Calculation- OT    OT Start Time  1530  -      OT Stop Time  1600  -      OT Time Calculation (min)  30 min  -      Total Timed Code Minutes- OT  30 minute(s)  -      OT Received On  12/13/19  -      OT - Next Appointment  12/16/19  -        User Key  (r) = Recorded By, (t) = Taken By, (c) = Cosigned By    Initials Name Provider Type     Jennifer Bass OT Occupational Therapist        Therapy Charges for Today     Code Description Service Date Service Provider Modifiers Qty    08104194587  OT SELF CARE/MGMT/TRAIN EA 15 MIN 12/13/2019 Jennifer Bass OT GO 1    85749412426  OT THERAPEUTIC ACT EA 15 MIN 12/13/2019 Jennifer Bass OT GO 1    [id number]  OT DEV OF COGN SKILLS EACH 15 MIN 12/13/2019 Jennifer Bass OT  1               Jennifer Bass OT  12/13/2019

## 2019-12-13 NOTE — PROGRESS NOTES
Continued Stay Note  AMAYA Pittman     Patient Name: Yuriy Bennett  MRN: [id number]  Today's Date: 12/13/2019    Admit Date: 12/1/2019    Discharge Plan     Row Name 12/13/19 3838       Plan    Plan  DC Plan: [facility] ([address] [place], [place]-Call Report to: #[phone] Fax DC summary #[phone]). [facility] AT DC. [place] approved. Alejandra approved starting 12/14.     Plan Comments  SW spoke to spouse on 12/13 and advised her that pt will DC on 12/14.     Row Name 12/13/19 1444             Expected Discharge Date and Time     Expected Discharge Date Expected Discharge Time    Dec 9, 2019         SAILAJA Moreno    Phone # [phone]  Cell #[phone]  Fax#[phone]  [email or web address]      SAILAJA Moreno

## 2019-12-13 NOTE — PROGRESS NOTES
Continued Stay Note  AMAYA Pittman     Patient Name: Yuriy Bennett  MRN: [id number]  Today's Date: 12/13/2019    Admit Date: 12/1/2019    Discharge Plan     Row Name 12/13/19 1444       Plan    Plan  DC Plan: [facility] ([place]) ACCEPTED - PRECERT APPROVED FOR 12/14/19 - 12/17/19. Pt must be sitter free and no IM psych meds.               Expected Discharge Date and Time     Expected Discharge Date Expected Discharge Time    Dec 9, 2019             Roxy Holm RN

## 2019-12-13 NOTE — PLAN OF CARE
Problem: Fall Risk (Adult)  Goal: Identify Related Risk Factors and Signs and Symptoms  12/13/2019 0209 by Portia Ortiz RN  Outcome: Ongoing (interventions implemented as appropriate)  Note:   Pt sleeping at this time. No signs of distress noted. Will continue to monitor.

## 2019-12-13 NOTE — PLAN OF CARE
Problem: Patient Care Overview  Goal: Plan of Care Review  Outcome: Ongoing (interventions implemented as appropriate)  Flowsheets  Taken 12/13/2019 1148  Progress: improving  Plan of Care Reviewed With: patient  Taken 12/13/2019 1145  Outcome Summary: Pt was in good spirits this date and agreed to PT. Pt was standing in room upon arrival. Pt amb throughout room with PTA with HHA, no noted LOB. Pt completed ther ex with v/cs, Pt still shows motor planning deficits this date but seemed to be more oriented today. Vitals stable. Will cont to benefot from skilled PT.

## 2019-12-13 NOTE — THERAPY TREATMENT NOTE
Patient Name: Yuriy Bennett  : 1940    MRN: [id number]                              Today's Date: 2019       Admit Date: 2019    Visit Dx:     ICD-10-CM ICD-9-CM   1. Pneumonia of right lower lobe due to infectious organism (CMS/[facility]) J18.1 486   2. Delirium due to another medical condition F05 293.0   3. Alzheimer's dementia with behavioral disturbance, unspecified timing of dementia onset (CMS/[facility]) G30.9 331.0    F02.81 294.11   4. Sepsis without acute organ dysfunction, due to unspecified organism (CMS/[facility]) A41.9 038.9     995.91   5. Mass of left chest wall R22.2 786.6     Patient Active Problem List   Diagnosis   • Cystitis   • Alzheimer's dementia with behavioral disturbance (CMS/[facility])   • Uncontrolled type 2 diabetes mellitus with hyperglycemia (CMS/[facility])   • H/O partial seizures   • History of prostate cancer   • Hyperlipidemia   • Noncompliance with medication regimen   • Nephrolithiasis   • Proteinuria   • HCAP (healthcare-associated pneumonia)   • Primary hypertension   • Cellulitis of right lower extremity   • Dementia with behavioral disturbance (CMS/HCC)   • Toxic metabolic encephalopathy     Past Medical History:   Diagnosis Date   • Alzheimer disease (CMS/[facility])    • Cancer (CMS/[facility])     prostate   • Diabetes mellitus (CMS/[facility])    • Hypertension      History reviewed. No pertinent surgical history.  General Information     Row Name 19 1140          PT Evaluation Time/Intention    Document Type  therapy note (daily note)  -     Mode of Treatment  physical therapy  -     Row Name 19 1140          General Information    Patient Profile Reviewed?  yes  -     Existing Precautions/Restrictions  fall  -     Barriers to Rehab  cognitive status  -     Row Name 19 1140          Resource/Environmental Concerns    Current Living Arrangements  residential facility  -     Row Name 19 1140          Cognitive Assessment/Intervention- PT/OT    Orientation Status  (Cognition)  oriented to;person  -     Cognitive Assessment/Intervention Comment  Pt agreed to PT this date.  -     Row Name 12/13/19 1140          Safety Issues, Functional Mobility    Safety Issues Affecting Function (Mobility)  ability to follow commands;at risk behavior observed;awareness of need for assistance;impulsivity;problem solving;positioning of assistive device;judgment;insight into deficits/self awareness  -     Impairments Affecting Function (Mobility)  cognition;balance;coordination  -     Comment, Safety Issues/Impairments (Mobility)  Pt did well this morning in PT. Still had motor planning issues but was a lot less confused this date as compared to previous sessions.  -       User Key  (r) = Recorded By, (t) = Taken By, (c) = Cosigned By    Initials Name Provider Type    Stacia Carranza PTA Physical Therapy Assistant        Mobility     Row Name 12/13/19 1142          Bed Mobility Assessment/Treatment    Comment (Bed Mobility)  Pt was standing in room upon arrival. Pt amb to chair with HHA and then completed ther ex. Once finished req Mod A and v/cs to return to supine.  -     Row Name 12/13/19 1142          Sit-Stand Transfer    Sit-Stand Dix (Transfers)  minimum assist (75% patient effort);verbal cues  -     Assistive Device (Sit-Stand Transfers)  other (see comments) HHA  -     Row Name 12/13/19 1142          Gait/Stairs Assessment/Training    Gait/Stairs Assessment/Training  -- HHA  -     Dix Level (Gait)  minimum assist (75% patient effort);verbal cues  -     Distance in Feet (Gait)  10ft throughout room with HHA, no LOB noted.  -     Pattern (Gait)  step-through  -       User Key  (r) = Recorded By, (t) = Taken By, (c) = Cosigned By    Initials Name Provider Type    Stacia Carranza PTA Physical Therapy Assistant        Obj/Interventions     Row Name 12/13/19 1144          Therapeutic Exercise    Lower Extremity (Therapeutic Exercise)  LAQ (long  arc quad), bilateral;marching while seated;gluteal sets;hamstring sets, bilateral  -     Lower Extremity Range of Motion (Therapeutic Exercise)  hip abduction/adduction, bilateral;knee flexion/extension, bilateral;ankle dorsiflexion/plantar flexion, bilateral  -     Weight/Resistance (Therapeutic Exercise)  manual resistance  -     Exercise Type (Therapeutic Exercise)  AROM (active range of motion)  -     Position (Therapeutic Exercise)  seated  -     Sets/Reps (Therapeutic Exercise)  2x10 with v/cs  -     Row Name 12/13/19 1144          Static Sitting Balance    Level of [place] (Unsupported Sitting, Static Balance)  supervision  -     Sitting Position (Unsupported Sitting, Static Balance)  sitting in chair  -     Time Able to Maintain Position (Unsupported Sitting, Static Balance)  4 to 5 minutes  -       User Key  (r) = Recorded By, (t) = Taken By, (c) = Cosigned By    Initials Name Provider Type     Stacia Pop PTA Physical Therapy Assistant        Goals/Plan    No documentation.       Clinical Impression     Row Name 12/13/19 1145          Pain Scale: Numbers Pre/Post-Treatment    Pain Scale: Numbers, Pretreatment  0/10 - no pain  -     Pain Scale: Numbers, Post-Treatment  0/10 - no pain  -     Row Name 12/13/19 1145          Pain Scale: FACES Pre/Post-Treatment    Pain: FACES Scale, Pretreatment  0-->no hurt  -     Pain: FACES Scale, Post-Treatment  0-->no hurt  -     Row Name 12/13/19 1145          Plan of Care Review    Plan of Care Reviewed With  patient  -     Progress  improving  -     Outcome Summary  Pt was in good spirits this date and agreed to PT. Pt was standing in room upon arrival. Pt amb throughout room with PTA with HHA, no noted LOB. Pt completed ther ex with v/cs, Pt still shows motor planning deficits this date but seemed to be more oriented today. Vitals stable. Will cont to benefot from skilled PT.   -     Row Name 12/13/19 1149          Physical  Therapy Clinical Impression    Criteria for Skilled Interventions Met (PT Clinical Impression)  yes;treatment indicated  -     Rehab Potential (PT Clinical Summary)  good, to achieve stated therapy goals  -     Row Name 12/13/19 1145          Vital Signs    O2 Delivery Intra Treatment  room air  -     Row Name 12/13/19 1145          Positioning and Restraints    Pre-Treatment Position  other (comment) standing in room  -     Post Treatment Position  bed  -     In Bed  notified nsg;fowlers;exit alarm on;encouraged to call for assist;call light within reach;side rails up x3  -       User Key  (r) = Recorded By, (t) = Taken By, (c) = Cosigned By    Initials Name Provider Type    Stacia Carranza, SEUN Physical Therapy Assistant        Outcome Measures    No documentation.         PT Recommendation and Plan     Outcome Summary/Treatment Plan (PT)  Anticipated Discharge Disposition (PT): inpatient rehabilitation facility  Plan of Care Reviewed With: patient  Progress: improving  Outcome Summary: Pt was in good spirits this date and agreed to PT. Pt was standing in room upon arrival. Pt amb throughout room with PTA with HHA, no noted LOB. Pt completed ther ex with v/cs, Pt still shows motor planning deficits this date but seemed to be more oriented today. Vitals stable. Will cont to benefot from skilled PT.      Time Calculation:   PT Charges     Row Name 12/13/19 1149             Time Calculation    Start Time  0847  -      Stop Time  0900  -      Time Calculation (min)  13 min  -      PT Received On  12/13/19  -      PT - Next Appointment  12/16/19  -         Time Calculation- PT    Total Timed Code Minutes- PT  13 minute(s)  -        User Key  (r) = Recorded By, (t) = Taken By, (c) = Cosigned By    Initials Name Provider Type    Stacia Carranza PTA Physical Therapy Assistant        Therapy Charges for Today     Code Description Service Date Service Provider Modifiers Qty    [id number]  PT  THERAPEUTIC ACT EA 15 MIN 12/13/2019 Stacia Pop, PTA GP 1          PT G-Codes  Outcome Measure Options: AM-PAC 6 Clicks Basic Mobility (PT)  AM-PAC 6 Clicks Score (PT): 13    Stacia Pop PTA  12/13/2019

## 2019-12-13 NOTE — PLAN OF CARE
Pt still needs CGA for balance & is deconditioned. He will benefit from IP rehab with memory care. He is not able to complete functional tasks without assist that have more than one task component involved.

## 2019-12-13 NOTE — PROGRESS NOTES
[facility] Medicine Services Daily Progress Note      Hospitalist Team  LOS 12 days      Patient Care Team:  Carolina Tran FNP as PCP - General (Family Medicine)    Patient Location: 234/1      Subjective   Subjective     Chief Complaint / Subjective  Chief Complaint   Patient presents with   • Altered Mental Status       HPI limited with confusion and dementia. No family in room. Awake and eating lunch. Nurse reports one episode increased confusion this am but then has been stable thereafter.     Brief Synopsis of Hospital Course/HPI  79-year-old male sent in from a mini home after he was noted to have altered mental status after lunch.  The patient apparently had been evaluated 10 days ago for worsening confusion and delirium but had a negative work-up at that time.  His blood sugar was reportedly normal.  There is no documented elevation of fever or chills although the patient's significant other noticed that he felt warm.  The patient apparently has recently been started on valproic acid for dementia features and no recent levels were reported.  Patient also apparently receives lorazepam for generalized anxiety     History from the wife.  She was generally unsatisfied with care at the psych inpatient facility.  Unknown why he was admitted over there.  Prior to that he was in nursing home and before that he was at [facility].  She states that he has not been home in about 2 months.  Mostly bedbound.  Needs plenty of help.  Known history of prostate cancer in the past with seed implantation.          Review of Systems   Unable to perform ROS: dementia         Objective  oriented to person only. Await precert  Objective      Vital Signs  Temp:  [97.5 °F (36.4 °C)-97.9 °F (36.6 °C)] 97.5 °F (36.4 °C)  Heart Rate:  [66-95] 72  Resp:  [14-22] 18  BP: (117-147)/() 117/74  Oxygen Therapy  SpO2: 97 %  Pulse Oximetry Type: Intermittent  Device (Oxygen Therapy): room air  Flow (L/min):  "3  Flowsheet Rows      First Filed Value   Admission Height  182.9 cm (72\") Documented at 12/01/2019 1411   Admission Weight  79.4 kg (175 lb) Documented at 12/01/2019 1411        Intake & Output (last 3 days)       12/10 0701 - 12/11 0700 12/11 0701 - 12/12 0700 12/12 0701 - 12/13 0700 12/13 0701 - 12/14 0700    P.O. 600 1560 1000     Total Intake(mL/kg) 600 (10) 1560 (26) 1000 (16.7)     Net +600 +1560 +1000             Urine Unmeasured Occurrence 3 x 2 x 6 x     Stool Unmeasured Occurrence 1 x           Lines, Drains & Airways    Active LDAs     None                  Physical Exam:    Physical Exam   Constitutional: No distress.   HENT:   Head: Normocephalic.   Eyes: Pupils are equal, round, and reactive to light.   Pulmonary/Chest: Effort normal.   Diminished BS at the bases   Abdominal: Soft.   Neurological: He is alert.   Oriented x1   Psychiatric:   Flat affect, confused follows some commands   Vitals reviewed.        Results Review:     I reviewed the patient's new clinical results.      Lab Results (last 24 hours)     Procedure Component Value Units Date/Time    POC Glucose Once [[id number]]  (Abnormal) Collected:  12/12/19 2020    Specimen:  Blood Updated:  12/12/19 2022     Glucose 145 mg/dL      Comment: Serial Number: [id number]:  [id number]       POC Glucose Once [[id number]]  (Abnormal) Collected:  12/12/19 1643    Specimen:  Blood Updated:  12/12/19 1644     Glucose 257 mg/dL      Comment: Serial Number: [id number]:  [id number]       POC Glucose Once [[id number]]  (Abnormal) Collected:  12/12/19 1111    Specimen:  Blood Updated:  12/12/19 1112     Glucose 223 mg/dL      Comment: Serial Number: [id number]:  [id number]           No results found for: HGBA1C                Microbiology Results (last 10 days)     ** No results found for the last 240 hours. **          ECG/EMG Results (most recent)     None                    No radiology results for the last 7 days    Xrays, labs reviewed " personally by physician.    Medication Review:   I have reviewed the patient's current medication list      Scheduled Meds    Divalproex Sodium 250 mg Oral Q8H   famotidine 40 mg Oral BID   fluconazole 200 mg Oral Q24H   folic acid 1 mg Oral Daily   heparin (porcine) 5,000 Units Subcutaneous Q12H   insulin glargine 10 Units Subcutaneous QAM   insulin lispro 0-9 Units Subcutaneous 4x Daily With Meals & Nightly   LORazepam 1 mg Oral Nightly   metoprolol tartrate 12.5 mg Oral BID   sodium chloride 10 mL Intravenous Q12H   thiamine 100 mg Oral Daily       Meds Infusions       Meds PRN  •  acetaminophen **OR** acetaminophen **OR** acetaminophen  •  acetaminophen  •  bisacodyl  •  dextrose  •  dextrose  •  glucagon (human recombinant)  •  haloperidol lactate  •  insulin lispro **AND** insulin lispro  •  lactated ringers  •  polyethylene glycol  •  prochlorperazine  •  QUEtiapine  •  sodium chloride  •  sodium chloride    I personally reviewed patient's EKG    Assessment/Plan Humanmetapneumo virus infection  Yeast UTI- start Diflucan  Assessment/Plan     Active Hospital Problems:  Toxic metabolic encephalopathy  D/t PNA and sepsis  tx underlying causes    Slow to improve    Dementia with behavioral disturbance (CMS/HCC)  Psych following  Continue medication regimen      Cellulitis of right lower extremity- (present on admission)  Improving abx as above    Primary hypertension- (present on admission)  Continue home medications      HCAP (healthcare-associated pneumonia)- (present on admission)  Continue Augmentin for course   Supportive treatment  Cx negative     Uncontrolled type 2 diabetes mellitus with hyperglycemia (CMS/HCC)- (present on admission)  BG stable no hypoglycemic episodes now  Stopped  pre-meal , continue basal  SSI and follow BG             VTE Prophylaxis - heparin.      Code Status -   Code Status and Medical Interventions:   Ordered at: 12/01/19 1925     Code Status:    CPR     Medical Interventions  (Level of Support Prior to Arrest):    Full       Discharge Planning    Was from In behavioral health unit but wife does not want to return to that facility, rehab placement unsuccessful, now pending other inpatient psych options or and further discharge plan.  following  Await placement    Electronically signed by Doug Diaz Jr., MD, 12/13/19, 7:05 AM.  [facility] Hospitalist Team

## 2019-12-14 NOTE — DISCHARGE SUMMARY
Date of Admission: 12/1/2019    Date of Discharge:  12/14/2019    Length of stay:  LOS: 13 days     Discharge Diagnosis: toxic metabolic encephalopathy due to HCAP    Presenting Problem/History of Present Illness  Active Hospital Problems    Diagnosis  POA   • Dementia with behavioral disturbance (CMS/HCC) [F03.91]  Unknown   • Toxic metabolic encephalopathy [G92]  Unknown   • HCAP (healthcare-associated pneumonia) [J18.9]  Yes   • Primary hypertension [I10]  Yes   • Cellulitis of right lower extremity [L03.115]  Yes   • Uncontrolled type 2 diabetes mellitus with hyperglycemia (CMS/HCC) [E11.65]  Yes   • Hyperlipidemia [E78.5]  Yes      Resolved Hospital Problems    Diagnosis Date Resolved POA   • **Sepsis due to pneumonia (CMS/HCC) [J18.9, A41.9] 12/08/2019 Yes          Hospital Course  Patient is a 79 y.o. male presented with confusion due to dementia and PNA. His PNA was treated and pt steadily improved      Past Medical History:     Past Medical History:   Diagnosis Date   • Alzheimer disease (CMS/HCC)    • Cancer (CMS/HCC)     prostate   • Diabetes mellitus (CMS/HCC)    • Hypertension        Past Surgical History:   History reviewed. No pertinent surgical history.    Social History:   Social History     Socioeconomic History   • Marital status:      Spouse name: Not on file   • Number of children: Not on file   • Years of education: Not on file   • Highest education level: Not on file   Tobacco Use   • Smoking status: Never Smoker   • Smokeless tobacco: Never Used   Substance and Sexual Activity   • Alcohol use: No   • Drug use: Defer   • Sexual activity: Defer       Procedures Performed         Consults:   Consults     No orders found from 11/2/2019 to 12/2/2019.          Pertinent Test Results:       Lab Results (most recent)     Procedure Component Value Units Date/Time    POC Glucose Once [[id number]]  (Abnormal) Collected:  12/14/19 0710    Specimen:  Blood Updated:  12/14/19 0712     Glucose 125  mg/dL      Comment: Serial Number: [id number]:  [id number]       POC Glucose Once [[id number]]  (Abnormal) Collected:  12/14/19 0602    Specimen:  Blood Updated:  12/14/19 0603     Glucose 115 mg/dL      Comment: Serial Number: [id number]:  [id number]       Comprehensive Metabolic Panel [[id number]]  (Abnormal) Collected:  12/10/19 0429    Specimen:  Blood Updated:  12/10/19 0540     Glucose 82 mg/dL      BUN 15 mg/dL      Creatinine 0.70 mg/dL      Sodium 141 mmol/L      Potassium 4.1 mmol/L      Chloride 103 mmol/L      CO2 29.0 mmol/L      Calcium 8.7 mg/dL      Total Protein 6.6 g/dL      Albumin 3.10 g/dL      ALT (SGPT) 10 U/L      AST (SGOT) 14 U/L      Alkaline Phosphatase 86 U/L      Total Bilirubin 0.2 mg/dL      eGFR Non African Amer 109 mL/min/1.73      Globulin 3.5 gm/dL      A/G Ratio 0.9 g/dL      BUN/Creatinine Ratio 21.4     Anion Gap 9.0 mmol/L     Narrative:       GFR Normal >60  Chronic Kidney Disease <60  Kidney Failure <15      CBC & Differential [[id number]] Collected:  12/10/19 0429    Specimen:  Blood Updated:  12/10/19 0513    Narrative:       The following orders were created for panel order CBC & Differential.  Procedure                               Abnormality         Status                     ---------                               -----------         ------                     CBC Auto Differential[[id number]]        Abnormal            Final result                 Please view results for these tests on the individual orders.    CBC Auto Differential [[id number]]  (Abnormal) Collected:  12/10/19 0429    Specimen:  Blood Updated:  12/10/19 0513     WBC 10.30 10*3/mm3      RBC 3.91 10*6/mm3      Hemoglobin 12.2 g/dL      Hematocrit 36.7 %      MCV 93.9 fL      MCH 31.1 pg      MCHC 33.1 g/dL      RDW 16.1 %      RDW-SD 52.5 fl      MPV 6.7 fL      Platelets 493 10*3/mm3      Neutrophil % 61.3 %      Lymphocyte % 24.0 %      Monocyte % 10.6 %      Eosinophil % 3.4 %      Basophil  % 0.7 %      Neutrophils, Absolute 6.30 10*3/mm3      Lymphocytes, Absolute 2.50 10*3/mm3      Monocytes, Absolute 1.10 10*3/mm3      Eosinophils, Absolute 0.30 10*3/mm3      Basophils, Absolute 0.10 10*3/mm3      nRBC 0.0 /100 WBC     Blood Culture - Blood, Arm, Right [[id number]] Collected:  12/01/19 1449    Specimen:  Blood from Arm, Right Updated:  12/06/19 1500     Blood Culture No growth at 5 days    Blood Culture - Blood, Arm, Left [[id number]] Collected:  12/01/19 1436    Specimen:  Blood from Arm, Left Updated:  12/06/19 1445     Blood Culture No growth at 5 days    Basic Metabolic Panel [[id number]]  (Abnormal) Collected:  12/06/19 0251    Specimen:  Blood Updated:  12/06/19 0449     Glucose 130 mg/dL      BUN 10 mg/dL      Creatinine 0.68 mg/dL      Sodium 141 mmol/L      Potassium 3.9 mmol/L      Chloride 101 mmol/L      CO2 30.0 mmol/L      Calcium 8.5 mg/dL      eGFR Non African Amer 112 mL/min/1.73      BUN/Creatinine Ratio 14.7     Anion Gap 10.0 mmol/L     Narrative:       GFR Normal >60  Chronic Kidney Disease <60  Kidney Failure <15    CBC & Differential [[id number]] Collected:  12/06/19 0251    Specimen:  Blood Updated:  12/06/19 0422    Narrative:       The following orders were created for panel order CBC & Differential.  Procedure                               Abnormality         Status                     ---------                               -----------         ------                     CBC Auto Differential[[id number]]        Abnormal            Final result                 Please view results for these tests on the individual orders.    CBC Auto Differential [[id number]]  (Abnormal) Collected:  12/06/19 0251    Specimen:  Blood Updated:  12/06/19 0422     WBC 5.50 10*3/mm3      RBC 3.56 10*6/mm3      Hemoglobin 11.0 g/dL      Hematocrit 33.4 %      MCV 93.9 fL      MCH 30.8 pg      MCHC 32.9 g/dL      RDW 15.4 %      RDW-SD 50.8 fl      MPV 7.2 fL      Platelets 353 10*3/mm3      Neutrophil %  54.8 %      Lymphocyte % 30.9 %      Monocyte % 11.3 %      Eosinophil % 2.3 %      Basophil % 0.7 %      Neutrophils, Absolute 3.00 10*3/mm3      Lymphocytes, Absolute 1.70 10*3/mm3      Monocytes, Absolute 0.60 10*3/mm3      Eosinophils, Absolute 0.10 10*3/mm3      Basophils, Absolute 0.00 10*3/mm3      nRBC 0.2 /100 WBC     Basic Metabolic Panel [[id number]]  (Abnormal) Collected:  12/05/19 0507    Specimen:  Blood Updated:  12/05/19 0613     Glucose 150 mg/dL      BUN 11 mg/dL      Creatinine 0.62 mg/dL      Sodium 139 mmol/L      Potassium 3.9 mmol/L      Chloride 101 mmol/L      CO2 29.0 mmol/L      Calcium 8.5 mg/dL      eGFR Non African Amer 125 mL/min/1.73      BUN/Creatinine Ratio 17.7     Anion Gap 9.0 mmol/L     Narrative:       GFR Normal >60  Chronic Kidney Disease <60  Kidney Failure <15    Urine Culture - Urine, Urine, Catheter [[id number]]  (Abnormal) Collected:  12/01/19 1702    Specimen:  Urine, Catheter Updated:  12/02/19 1328     Urine Culture Yeast isolated     Comment: No further work up.       Lactic Acid, Plasma [[id number]]  (Normal) Collected:  12/02/19 1243    Specimen:  Blood Updated:  12/02/19 1324     Lactate 2.0 mmol/L      Comment: Result checked        Lactic Acid, Plasma [[id number]]  (Normal) Collected:  12/02/19 0712    Specimen:  Blood Updated:  12/02/19 0806     Lactate 0.8 mmol/L     Respiratory Panel, PCR - Swab, Nasopharynx [[id number]]  (Abnormal) Collected:  12/01/19 2029    Specimen:  Swab from Nasopharynx Updated:  12/01/19 2154     ADENOVIRUS, PCR Not Detected     Coronavirus 229E Not Detected     Coronavirus HKU1 Not Detected     Coronavirus NL63 Not Detected     Coronavirus OC43 Not Detected     Human Metapneumovirus Detected     Human Rhinovirus/Enterovirus Not Detected     Influenza B PCR Not Detected     Parainfluenza Virus 1 Not Detected     Parainfluenza Virus 2 Not Detected     Parainfluenza Virus 3 Not Detected     Parainfluenza Virus 4 Not Detected     Bordetella  "pertussis pcr Not Detected     Influenza A H1 2009 PCR Not Detected     Chlamydophila pneumoniae PCR Not Detected     Mycoplasma pneumo by PCR Not Detected     Influenza A PCR Not Detected     Influenza A H3 Not Detected     Influenza A H1 Not Detected     RSV, PCR Not Detected    BNP [[id number]]  (Normal) Collected:  12/01/19 1436    Specimen:  Blood from Arm, Left Updated:  12/01/19 1954     proBNP 1,692.0 pg/mL     Narrative:       Among patients with dyspnea, NT-proBNP is highly sensitive for the detection of acute congestive heart failure. In addition NT-proBNP of <300 pg/ml effectively rules out acute congestive heart failure with 99% negative predictive value.    Magnesium [[id number]]  (Normal) Collected:  12/01/19 1436    Specimen:  Blood from Arm, Left Updated:  12/01/19 1949     Magnesium 2.0 mg/dL     Procalcitonin [[id number]]  (Abnormal) Collected:  12/01/19 1436    Specimen:  Blood from Arm, Left Updated:  12/01/19 1927     Procalcitonin 0.97 ng/mL     Narrative:       As a Marker for Sepsis (Non-Neonates):   1. <0.5 ng/mL represents a low risk of severe sepsis and/or septic shock.  1. >2 ng/mL represents a high risk of severe sepsis and/or septic shock.    As a Marker for Lower Respiratory Tract Infections that require antibiotic therapy:  PCT on Admission     Antibiotic Therapy             6-12 Hrs later  > 0.5                Strongly Recommended            >0.25 - <0.5         Recommended  0.1 - 0.25           Discouraged                   Remeasure/reassess PCT  <0.1                 Strongly Discouraged          Remeasure/reassess PCT      As 28 day mortality risk marker: \"Change in Procalcitonin Result\" (> 80 % or <=80 %) if Day 0 (or Day 1) and Day 4 values are available. Refer to http://www.Anywhere.FMs-pct-calculator.com/   Change in PCT <=80 %   A decrease of PCT levels below or equal to 80 % defines a positive change in PCT test result representing a higher risk for 28-day all-cause mortality of " patients diagnosed with severe sepsis or septic shock.  Change in PCT > 80 %   A decrease of PCT levels of more than 80 % defines a negative change in PCT result representing a lower risk for 28-day all-cause mortality of patients diagnosed with severe sepsis or septic shock.                Lactic Acid, Reflex [[id number]]  (Abnormal) Collected:  12/01/19 1808    Specimen:  Blood Updated:  12/01/19 1851     Lactate 2.2 mmol/L     Lactic Acid, Reflex Timer (This will reflex a repeat order 3-3:15 hours after ordered.) [[id number]] Collected:  12/01/19 1430    Specimen:  Blood Updated:  12/01/19 1745     Extra Tube Hold for add-ons.     Comment: Auto resulted.       Urinalysis, Microscopic Only - Urine, Catheter [[id number]]  (Abnormal) Collected:  12/01/19 1702    Specimen:  Urine, Catheter Updated:  12/01/19 1724     RBC, UA 21-30 /HPF      WBC, UA Too Numerous to Count /HPF      Bacteria, UA None Seen /HPF      Squamous Epithelial Cells, UA None Seen /HPF      Hyaline Casts, UA 0-2 /LPF      Methodology Manual Light Microscopy    Urinalysis With Culture If Indicated - Urine, Catheter [[id number]]  (Abnormal) Collected:  12/01/19 1702    Specimen:  Urine, Catheter Updated:  12/01/19 1714     Color, UA Red     Appearance, UA Cloudy     pH, UA 7.5     Specific Gravity, UA 1.016     Glucose,  mg/dL (2+)     Ketones, UA Negative     Bilirubin, UA Negative     Blood, UA Large (3+)     Protein,  mg/dL (2+)     Leuk Esterase, UA Moderate (2+)     Nitrite, UA Negative     Urobilinogen, UA 0.2 E.U./dL    Chandler Draw [[id number]] Collected:  12/01/19 1436    Specimen:  Blood Updated:  12/01/19 1545    Narrative:       The following orders were created for panel order Chandler Draw.  Procedure                               Abnormality         Status                     ---------                               -----------         ------                     Light Blue Top[[id number]]                                   Final  result               Green Top (Gel)[[id number]]                                  Final result               Lavender Top[[id number]]                                     Final result               Gold Top - SST[[id number]]                                   Final result                 Please view results for these tests on the individual orders.    Lavender Top [[id number]] Collected:  12/01/19 1436    Specimen:  Blood from Arm, Left Updated:  12/01/19 1545     Extra Tube hold for add-on     Comment: Auto resulted       Light Blue Top [[id number]] Collected:  12/01/19 1436    Specimen:  Blood from Arm, Left Updated:  12/01/19 1545     Extra Tube hold for add-on     Comment: Auto resulted       Green Top (Gel) [[id number]] Collected:  12/01/19 1436    Specimen:  Blood from Arm, Left Updated:  12/01/19 1545     Extra Tube Hold for add-ons.     Comment: Auto resulted.       Gold Top - SST [[id number]] Collected:  12/01/19 1436    Specimen:  Blood from Arm, Left Updated:  12/01/19 1545     Extra Tube Hold for add-ons.     Comment: Auto resulted.       Comprehensive Metabolic Panel [[id number]]  (Abnormal) Collected:  12/01/19 1436    Specimen:  Blood from Arm, Left Updated:  12/01/19 1530     Glucose 272 mg/dL      BUN 18 mg/dL      Creatinine 0.96 mg/dL      Sodium 134 mmol/L      Potassium 4.8 mmol/L      Chloride 97 mmol/L      CO2 27.0 mmol/L      Calcium 8.4 mg/dL      Total Protein 7.2 g/dL      Albumin 3.20 g/dL      ALT (SGPT) 13 U/L      AST (SGOT) 21 U/L      Comment: Specimen hemolyzed.  Results may be affected.        Alkaline Phosphatase 124 U/L      Total Bilirubin 0.2 mg/dL      eGFR Non African Amer 75 mL/min/1.73      Globulin 4.0 gm/dL      A/G Ratio 0.8 g/dL      BUN/Creatinine Ratio 18.8     Anion Gap 10.0 mmol/L     Narrative:       GFR Normal >60  Chronic Kidney Disease <60  Kidney Failure <15    Valproic Acid Level, Total [[id number]]  (Abnormal) Collected:  12/01/19 1436    Specimen:  Blood from Arm,  Left Updated:  12/01/19 1511     Valproic Acid 26.7 mcg/mL     POC Lactate [[id number]]  (Abnormal) Collected:  12/01/19 1446    Specimen:  Blood Updated:  12/01/19 1446    POC Lactate [[id number]]  (Abnormal) Collected:  12/01/19 1430    Specimen:  Blood Updated:  12/01/19 1432     Lactate 2.3 mmol/L      Comment: Serial Number: [id number]:  [id number]                     Imaging Results (Most Recent)     Procedure Component Value Units Date/Time    XR Chest 1 View [[id number]] Collected:  12/02/19 0723     Updated:  12/02/19 0726    Narrative:       DATE OF EXAM:  12/2/2019 5:37 AM     PROCEDURE:  XR CHEST 1 VW-     INDICATIONS:  Pneumonia; J18.1-Lobar pneumonia, unspecified organism; F05-Delirium due  to known physiological condition; G30.9-Alzheimer's disease,  unspecified; F02.81-Dementia in other diseases classified elsewhere with  behavioral disturbance; A41.9-Sepsis, unspecified organism;  R22.2-Localized swelling, mass and lump, trunk     COMPARISON:  Study of 12/01/2019 at 2:33 PM     TECHNIQUE:   Single radiographic AP view of the chest was obtained.     FINDINGS:  Today's AP erect portable chest was obtained on 12/02/2019 at  approximately 5:40 AM     Today's examination demonstrates interval change perihilar infiltrates  are present with mild cephalization of the pulmonary blood flow changes  may be secondary to failure and/or low-grade failure with superimposed  perihilar pneumonias. There is been interval worsening from December 1.        Impression:          1. Moderate interval worsening from December 1  2. Bilateral perihilar pneumonia is mild to moderate in degree along  with change suggesting low-grade failure     Electronically Signed By-Yunier Coronel Jr. On:12/2/2019 7:24 AM  This report was finalized on [id number] by  Yunier Coronel Jr., .    XR Chest 1 View [[id number]] Collected:  12/01/19 1453     Updated:  12/01/19 1458    Narrative:       Examination: XR CHEST 1 VW-     Date of Exam:  12/1/2019 2:41 PM     Indication: Fever cough.       Comparison: None available.     Technique: 1 view of the chest      FINDINGS:  The heart size is within normal. Mediastinal and hilar contours are  within normal. There is a nodular opacity projecting in the anterior end  of the left first rib that measures about 2 cm. There is mild airspace  opacity in the left base. There is minor linear opacity in the left  lower lung. Right lung is clear. No pleural effusion or pneumothorax.       Impression:       1. 2 cm nodular opacity in the left upper chest is most likely due to  calcified cartilage at the left first rib and. Recommend PA and lateral  when possible to confirm.  2. Mild left basal infiltrate versus atelectasis.     Electronically Signed By-Joelle Luna On:12/1/2019 2:56 PM  This report was finalized on [id number] by  Joelle Luna, .          Condition on Discharge:  stable    Vital Signs  Temp:  [97.2 °F (36.2 °C)-98.5 °F (36.9 °C)] 97.2 °F (36.2 °C)  Heart Rate:  [65-77] 65  Resp:  [16-18] 16  BP: (127-153)/(69-80) 127/69    Physical Exam:     General Appearance:    Alert, cooperative, in no acute distress   Lungs:     Clear to auscultation,respirations regular, even and                  unlabored    Heart:    Regular rhythm and normal rate, normal S1 and S2, no            murmur, no gallop, no rub, no click   Abdomen:     Normal bowel sounds, no masses, no organomegaly, soft        non-tender, non-distended, no guarding, no rebound                tenderness   Extremities:   Moves all extremities well, no edema, no cyanosis, no             redness       Discharge Disposition  [facility]    Discharge Medications     Discharge Medications      Continue These Medications      Instructions Start Date   divalproex 125 MG DR tablet  Commonly known as:  DEPAKOTE   250 mg, Oral, 3 Times Daily      insulin glargine 100 UNIT/ML injection  Commonly known as:  LANTUS   15 Units, Subcutaneous, Daily       metoprolol tartrate 25 MG tablet  Commonly known as:  LOPRESSOR   12.5 mg, Oral, 2 Times Daily      QUEtiapine 25 MG tablet  Commonly known as:  SEROquel   25 mg, Oral, Every 4 Hours PRN         Stop These Medications    acetaminophen 325 MG tablet  Commonly known as:  TYLENOL     ATIVAN 2 MG/ML injection  Generic drug:  LORazepam     famotidine 20 MG tablet  Commonly known as:  PEPCID     folic acid 1 MG tablet  Commonly known as:  FOLVITE     GEODON 20 MG injection  Generic drug:  ziprasidone     HALDOL 5 MG/ML injection  Generic drug:  haloperidol lactate     insulin lispro 100 UNIT/ML injection  Commonly known as:  humaLOG     loperamide 2 MG capsule  Commonly known as:  IMODIUM     LORazepam 1 MG tablet  Commonly known as:  ATIVAN     magnesium hydroxide 400 MG/5ML suspension  Commonly known as:  MILK OF MAGNESIA     polyethylene glycol packet  Commonly known as:  MIRALAX     temazepam 15 MG capsule  Commonly known as:  RESTORIL     thiamine 100 MG tablet  Commonly known as:  VITAMIN B-1            Discharge Diet: healthy heart      Activity at Discharge: as tolerated      Follow-up Appointments pcp 2 weeks  No future appointments.      Test Results Pending at Discharge       Risk for Readmission (LACE) Score: 14 (12/14/2019  6:00 AM)          Doug Diaz Jr., MD  12/14/19  7:23 AM    Time:

## 2019-12-14 NOTE — PLAN OF CARE
Problem: Patient Care Overview  Goal: Plan of Care Review  Outcome: Ongoing (interventions implemented as appropriate)  Flowsheets (Taken 12/14/2019 0333)  Progress: no change  Plan of Care Reviewed With: patient  Outcome Summary: Patient is very confused AOX1.  Not combative and has stayed in his bed this evening sleeping.  The patient is garbled in his speech but has equal grasp and smile.  The patient wets a lot and is excoriated on his bottom, monitor q 2 for moisture and positioning.  Patient is very mobile in his bed and often changes position on his own.  Will continue to monitor.

## 2019-12-14 NOTE — SIGNIFICANT NOTE
Pt has great deal of confusion.  Pt is alert to self only.  Pt to be accompanied by EMS to [facility] in [place], [place].

## 2019-12-16 NOTE — PROGRESS NOTES
Case Management Discharge Note      Final Note: [facility] in[facility] re[facility]. [facility] [place]    Provided post acute provider list?: Yes(Attempted to give [facility] booklet, spouse had one already.)  Post Acute Provider Lists: Inpatient Rehab  Post Acuite Provider List: Attempted  Delivered To: Support Person  Support Person: Dorys fernandez  Method of Delivery: In person                 Final Discharge Disposition Code: 03 - skilled nursing facility (SNF)

## 2019-12-18 PROBLEM — D72.829 LEUKOCYTOSIS: Status: ACTIVE | Noted: 2019-01-01

## 2019-12-18 PROBLEM — R82.90 ABNORMAL URINALYSIS: Status: ACTIVE | Noted: 2019-01-01

## 2019-12-19 PROBLEM — N30.00 ACUTE CYSTITIS WITHOUT HEMATURIA: Status: ACTIVE | Noted: 2019-01-01

## 2019-12-19 PROBLEM — N35.919 URETHRAL STRICTURE: Status: ACTIVE | Noted: 2019-01-01

## 2019-12-19 PROBLEM — R33.9 URINE RETENTION: Status: ACTIVE | Noted: 2019-01-01

## 2019-12-25 PROBLEM — Z66 DNR (DO NOT RESUSCITATE): Status: ACTIVE | Noted: 2019-01-01

## 2019-12-25 PROBLEM — D72.829 LEUKOCYTOSIS: Status: RESOLVED | Noted: 2019-01-01 | Resolved: 2019-01-01

## 2020-01-01 ENCOUNTER — APPOINTMENT (OUTPATIENT)
Dept: GENERAL RADIOLOGY | Facility: HOSPITAL | Age: 80
End: 2020-01-01

## 2020-01-01 ENCOUNTER — HOSPITAL ENCOUNTER (INPATIENT)
Facility: HOSPITAL | Age: 80
LOS: 2 days | End: 2020-03-01
Attending: INTERNAL MEDICINE | Admitting: INTERNAL MEDICINE

## 2020-01-01 VITALS
TEMPERATURE: 104.7 F | SYSTOLIC BLOOD PRESSURE: 138 MMHG | BODY MASS INDEX: 17.18 KG/M2 | DIASTOLIC BLOOD PRESSURE: 61 MMHG | WEIGHT: 126.7 LBS | RESPIRATION RATE: 31 BRPM | OXYGEN SATURATION: 90 % | HEART RATE: 136 BPM

## 2020-01-01 VITALS — WEIGHT: 122.6 LBS | TEMPERATURE: 103 F | HEIGHT: 72 IN | BODY MASS INDEX: 16.61 KG/M2

## 2020-01-01 LAB
ALBUMIN SERPL-MCNC: 2.4 G/DL (ref 3.5–5.2)
ALBUMIN SERPL-MCNC: 2.4 G/DL (ref 3.5–5.2)
ALBUMIN SERPL-MCNC: 2.8 G/DL (ref 3.5–5.2)
ALBUMIN SERPL-MCNC: 2.9 G/DL (ref 3.5–5.2)
ALBUMIN SERPL-MCNC: 3.2 G/DL (ref 3.5–5.2)
ALBUMIN/GLOB SERPL: 0.6 G/DL
ALBUMIN/GLOB SERPL: 0.7 G/DL
ALBUMIN/GLOB SERPL: 1 G/DL
ALP SERPL-CCNC: 111 U/L (ref 39–117)
ALP SERPL-CCNC: 112 U/L (ref 39–117)
ALP SERPL-CCNC: 122 U/L (ref 39–117)
ALP SERPL-CCNC: 136 U/L (ref 39–117)
ALP SERPL-CCNC: 89 U/L (ref 39–117)
ALT SERPL W P-5'-P-CCNC: 19 U/L (ref 1–41)
ALT SERPL W P-5'-P-CCNC: 31 U/L (ref 1–41)
ALT SERPL W P-5'-P-CCNC: 32 U/L (ref 1–41)
ALT SERPL W P-5'-P-CCNC: 44 U/L (ref 1–41)
ALT SERPL W P-5'-P-CCNC: 49 U/L (ref 1–41)
ANION GAP SERPL CALCULATED.3IONS-SCNC: 10.4 MMOL/L (ref 5–15)
ANION GAP SERPL CALCULATED.3IONS-SCNC: 10.4 MMOL/L (ref 5–15)
ANION GAP SERPL CALCULATED.3IONS-SCNC: 10.6 MMOL/L (ref 5–15)
ANION GAP SERPL CALCULATED.3IONS-SCNC: 10.8 MMOL/L (ref 5–15)
ANION GAP SERPL CALCULATED.3IONS-SCNC: 11.6 MMOL/L (ref 5–15)
ANION GAP SERPL CALCULATED.3IONS-SCNC: 11.7 MMOL/L (ref 5–15)
ANION GAP SERPL CALCULATED.3IONS-SCNC: 11.7 MMOL/L (ref 5–15)
ANION GAP SERPL CALCULATED.3IONS-SCNC: 15 MMOL/L (ref 5–15)
ANION GAP SERPL CALCULATED.3IONS-SCNC: 7.1 MMOL/L (ref 5–15)
ANION GAP SERPL CALCULATED.3IONS-SCNC: 9.4 MMOL/L (ref 5–15)
AST SERPL-CCNC: 13 U/L (ref 1–40)
AST SERPL-CCNC: 21 U/L (ref 1–40)
AST SERPL-CCNC: 27 U/L (ref 1–40)
AST SERPL-CCNC: 29 U/L (ref 1–40)
AST SERPL-CCNC: 35 U/L (ref 1–40)
BASOPHILS # BLD AUTO: 0.04 10*3/MM3 (ref 0–0.2)
BASOPHILS # BLD AUTO: 0.07 10*3/MM3 (ref 0–0.2)
BASOPHILS # BLD MANUAL: 0.24 10*3/MM3 (ref 0–0.2)
BASOPHILS NFR BLD AUTO: 0.3 % (ref 0–1.5)
BASOPHILS NFR BLD AUTO: 1 % (ref 0–1.5)
BASOPHILS NFR BLD AUTO: 1.2 % (ref 0–1.5)
BH CV LOWER ARTERIAL LEFT ABI RATIO: 1.2
BH CV LOWER ARTERIAL LEFT GREAT TOE SYS MAX: 142 MMHG
BH CV LOWER ARTERIAL LEFT POST TIBIAL SYS MAX: 206 MMHG
BH CV LOWER ARTERIAL LEFT TBI RATIO: 0.83
BH CV LOWER ARTERIAL RIGHT ABI RATIO: 1.2
BH CV LOWER ARTERIAL RIGHT DORSALIS PEDIS SYS MAX: 213 MMHG
BH CV LOWER ARTERIAL RIGHT GREAT TOE SYS MAX: 133 MMHG
BH CV LOWER ARTERIAL RIGHT TBI RATIO: 0.77
BILIRUB SERPL-MCNC: 0.2 MG/DL (ref 0.2–1.2)
BILIRUB SERPL-MCNC: <0.2 MG/DL (ref 0.2–1.2)
BILIRUB SERPL-MCNC: <0.2 MG/DL (ref 0.2–1.2)
BUN BLD-MCNC: 16 MG/DL (ref 8–23)
BUN BLD-MCNC: 16 MG/DL (ref 8–23)
BUN BLD-MCNC: 17 MG/DL (ref 8–23)
BUN BLD-MCNC: 20 MG/DL (ref 8–23)
BUN BLD-MCNC: 21 MG/DL (ref 8–23)
BUN BLD-MCNC: 25 MG/DL (ref 8–23)
BUN BLD-MCNC: 25 MG/DL (ref 8–23)
BUN BLD-MCNC: 34 MG/DL (ref 8–23)
BUN BLD-MCNC: 41 MG/DL (ref 8–23)
BUN BLD-MCNC: 41 MG/DL (ref 8–23)
BUN/CREAT SERPL: 17 (ref 7–25)
BUN/CREAT SERPL: 18.2 (ref 7–25)
BUN/CREAT SERPL: 21.5 (ref 7–25)
BUN/CREAT SERPL: 22.9 (ref 7–25)
BUN/CREAT SERPL: 24.7 (ref 7–25)
BUN/CREAT SERPL: 25.3 (ref 7–25)
BUN/CREAT SERPL: 27.5 (ref 7–25)
BUN/CREAT SERPL: 28.9 (ref 7–25)
BUN/CREAT SERPL: 29.4 (ref 7–25)
BUN/CREAT SERPL: 29.6 (ref 7–25)
CALCIUM SPEC-SCNC: 7.7 MG/DL (ref 8.6–10.5)
CALCIUM SPEC-SCNC: 8.1 MG/DL (ref 8.6–10.5)
CALCIUM SPEC-SCNC: 8.2 MG/DL (ref 8.6–10.5)
CALCIUM SPEC-SCNC: 8.3 MG/DL (ref 8.6–10.5)
CALCIUM SPEC-SCNC: 8.4 MG/DL (ref 8.6–10.5)
CALCIUM SPEC-SCNC: 8.5 MG/DL (ref 8.6–10.5)
CALCIUM SPEC-SCNC: 8.6 MG/DL (ref 8.6–10.5)
CALCIUM SPEC-SCNC: 8.8 MG/DL (ref 8.6–10.5)
CALCIUM SPEC-SCNC: 8.8 MG/DL (ref 8.6–10.5)
CALCIUM SPEC-SCNC: 8.9 MG/DL (ref 8.6–10.5)
CHLORIDE SERPL-SCNC: 100 MMOL/L (ref 98–107)
CHLORIDE SERPL-SCNC: 102 MMOL/L (ref 98–107)
CHLORIDE SERPL-SCNC: 103 MMOL/L (ref 98–107)
CHLORIDE SERPL-SCNC: 104 MMOL/L (ref 98–107)
CHLORIDE SERPL-SCNC: 106 MMOL/L (ref 98–107)
CHLORIDE SERPL-SCNC: 106 MMOL/L (ref 98–107)
CHLORIDE SERPL-SCNC: 121 MMOL/L (ref 98–107)
CHLORIDE SERPL-SCNC: 125 MMOL/L (ref 98–107)
CHLORIDE SERPL-SCNC: 125 MMOL/L (ref 98–107)
CHLORIDE SERPL-SCNC: 98 MMOL/L (ref 98–107)
CO2 SERPL-SCNC: 25 MMOL/L (ref 22–29)
CO2 SERPL-SCNC: 25.2 MMOL/L (ref 22–29)
CO2 SERPL-SCNC: 25.3 MMOL/L (ref 22–29)
CO2 SERPL-SCNC: 26.9 MMOL/L (ref 22–29)
CO2 SERPL-SCNC: 27.3 MMOL/L (ref 22–29)
CO2 SERPL-SCNC: 27.6 MMOL/L (ref 22–29)
CO2 SERPL-SCNC: 28.4 MMOL/L (ref 22–29)
CO2 SERPL-SCNC: 29.6 MMOL/L (ref 22–29)
CO2 SERPL-SCNC: 29.6 MMOL/L (ref 22–29)
CO2 SERPL-SCNC: 30.4 MMOL/L (ref 22–29)
CREAT BLD-MCNC: 0.7 MG/DL (ref 0.76–1.27)
CREAT BLD-MCNC: 0.71 MG/DL (ref 0.76–1.27)
CREAT BLD-MCNC: 0.79 MG/DL (ref 0.76–1.27)
CREAT BLD-MCNC: 0.81 MG/DL (ref 0.76–1.27)
CREAT BLD-MCNC: 0.85 MG/DL (ref 0.76–1.27)
CREAT BLD-MCNC: 0.88 MG/DL (ref 0.76–1.27)
CREAT BLD-MCNC: 0.91 MG/DL (ref 0.76–1.27)
CREAT BLD-MCNC: 1.42 MG/DL (ref 0.76–1.27)
CREAT BLD-MCNC: 1.62 MG/DL (ref 0.76–1.27)
CREAT BLD-MCNC: 2 MG/DL (ref 0.76–1.27)
D-LACTATE SERPL-SCNC: 2 MMOL/L (ref 0.5–2)
D-LACTATE SERPL-SCNC: 3.4 MMOL/L (ref 0.5–2)
D-LACTATE SERPL-SCNC: 3.6 MMOL/L (ref 0.5–2)
DEPRECATED RDW RBC AUTO: 41.4 FL (ref 37–54)
DEPRECATED RDW RBC AUTO: 42.9 FL (ref 37–54)
DEPRECATED RDW RBC AUTO: 44.8 FL (ref 37–54)
DEPRECATED RDW RBC AUTO: 46.8 FL (ref 37–54)
DEPRECATED RDW RBC AUTO: 47.7 FL (ref 37–54)
DEPRECATED RDW RBC AUTO: 48.2 FL (ref 37–54)
EOSINOPHIL # BLD AUTO: 0 10*3/MM3 (ref 0–0.4)
EOSINOPHIL # BLD AUTO: 0.26 10*3/MM3 (ref 0–0.4)
EOSINOPHIL NFR BLD AUTO: 0 % (ref 0.3–6.2)
EOSINOPHIL NFR BLD AUTO: 4.4 % (ref 0.3–6.2)
ERYTHROCYTE [DISTWIDTH] IN BLOOD BY AUTOMATED COUNT: 12.6 % (ref 12.3–15.4)
ERYTHROCYTE [DISTWIDTH] IN BLOOD BY AUTOMATED COUNT: 13.1 % (ref 12.3–15.4)
ERYTHROCYTE [DISTWIDTH] IN BLOOD BY AUTOMATED COUNT: 13.1 % (ref 12.3–15.4)
ERYTHROCYTE [DISTWIDTH] IN BLOOD BY AUTOMATED COUNT: 13.5 % (ref 12.3–15.4)
ERYTHROCYTE [DISTWIDTH] IN BLOOD BY AUTOMATED COUNT: 13.7 % (ref 12.3–15.4)
ERYTHROCYTE [DISTWIDTH] IN BLOOD BY AUTOMATED COUNT: 13.9 % (ref 12.3–15.4)
GFR SERPL CREATININE-BSD FRML MDRD: 107 ML/MIN/1.73
GFR SERPL CREATININE-BSD FRML MDRD: 109 ML/MIN/1.73
GFR SERPL CREATININE-BSD FRML MDRD: 32 ML/MIN/1.73
GFR SERPL CREATININE-BSD FRML MDRD: 41 ML/MIN/1.73
GFR SERPL CREATININE-BSD FRML MDRD: 48 ML/MIN/1.73
GFR SERPL CREATININE-BSD FRML MDRD: 80 ML/MIN/1.73
GFR SERPL CREATININE-BSD FRML MDRD: 83 ML/MIN/1.73
GFR SERPL CREATININE-BSD FRML MDRD: 87 ML/MIN/1.73
GFR SERPL CREATININE-BSD FRML MDRD: 92 ML/MIN/1.73
GFR SERPL CREATININE-BSD FRML MDRD: 94 ML/MIN/1.73
GLOBULIN UR ELPH-MCNC: 2.8 GM/DL
GLOBULIN UR ELPH-MCNC: 2.9 GM/DL
GLOBULIN UR ELPH-MCNC: 3.3 GM/DL
GLOBULIN UR ELPH-MCNC: 3.3 GM/DL
GLOBULIN UR ELPH-MCNC: 4 GM/DL
GLUCOSE BLD-MCNC: 133 MG/DL (ref 65–99)
GLUCOSE BLD-MCNC: 160 MG/DL (ref 65–99)
GLUCOSE BLD-MCNC: 164 MG/DL (ref 65–99)
GLUCOSE BLD-MCNC: 172 MG/DL (ref 65–99)
GLUCOSE BLD-MCNC: 212 MG/DL (ref 65–99)
GLUCOSE BLD-MCNC: 239 MG/DL (ref 65–99)
GLUCOSE BLD-MCNC: 285 MG/DL (ref 65–99)
GLUCOSE BLD-MCNC: 300 MG/DL (ref 65–99)
GLUCOSE BLD-MCNC: 336 MG/DL (ref 65–99)
GLUCOSE BLD-MCNC: 378 MG/DL (ref 65–99)
GLUCOSE BLDC GLUCOMTR-MCNC: 100 MG/DL (ref 70–130)
GLUCOSE BLDC GLUCOMTR-MCNC: 102 MG/DL (ref 70–130)
GLUCOSE BLDC GLUCOMTR-MCNC: 103 MG/DL (ref 70–130)
GLUCOSE BLDC GLUCOMTR-MCNC: 103 MG/DL (ref 70–130)
GLUCOSE BLDC GLUCOMTR-MCNC: 104 MG/DL (ref 70–130)
GLUCOSE BLDC GLUCOMTR-MCNC: 104 MG/DL (ref 70–130)
GLUCOSE BLDC GLUCOMTR-MCNC: 105 MG/DL (ref 70–130)
GLUCOSE BLDC GLUCOMTR-MCNC: 106 MG/DL (ref 70–130)
GLUCOSE BLDC GLUCOMTR-MCNC: 106 MG/DL (ref 70–130)
GLUCOSE BLDC GLUCOMTR-MCNC: 107 MG/DL (ref 70–130)
GLUCOSE BLDC GLUCOMTR-MCNC: 107 MG/DL (ref 70–130)
GLUCOSE BLDC GLUCOMTR-MCNC: 108 MG/DL (ref 70–130)
GLUCOSE BLDC GLUCOMTR-MCNC: 110 MG/DL (ref 70–130)
GLUCOSE BLDC GLUCOMTR-MCNC: 112 MG/DL (ref 70–130)
GLUCOSE BLDC GLUCOMTR-MCNC: 114 MG/DL (ref 70–130)
GLUCOSE BLDC GLUCOMTR-MCNC: 115 MG/DL (ref 70–130)
GLUCOSE BLDC GLUCOMTR-MCNC: 115 MG/DL (ref 70–130)
GLUCOSE BLDC GLUCOMTR-MCNC: 116 MG/DL (ref 70–130)
GLUCOSE BLDC GLUCOMTR-MCNC: 117 MG/DL (ref 70–130)
GLUCOSE BLDC GLUCOMTR-MCNC: 117 MG/DL (ref 70–130)
GLUCOSE BLDC GLUCOMTR-MCNC: 118 MG/DL (ref 70–130)
GLUCOSE BLDC GLUCOMTR-MCNC: 119 MG/DL (ref 70–130)
GLUCOSE BLDC GLUCOMTR-MCNC: 120 MG/DL (ref 70–130)
GLUCOSE BLDC GLUCOMTR-MCNC: 121 MG/DL (ref 70–130)
GLUCOSE BLDC GLUCOMTR-MCNC: 121 MG/DL (ref 70–130)
GLUCOSE BLDC GLUCOMTR-MCNC: 123 MG/DL (ref 70–130)
GLUCOSE BLDC GLUCOMTR-MCNC: 125 MG/DL (ref 70–130)
GLUCOSE BLDC GLUCOMTR-MCNC: 126 MG/DL (ref 70–130)
GLUCOSE BLDC GLUCOMTR-MCNC: 126 MG/DL (ref 70–130)
GLUCOSE BLDC GLUCOMTR-MCNC: 128 MG/DL (ref 70–130)
GLUCOSE BLDC GLUCOMTR-MCNC: 128 MG/DL (ref 70–130)
GLUCOSE BLDC GLUCOMTR-MCNC: 129 MG/DL (ref 70–130)
GLUCOSE BLDC GLUCOMTR-MCNC: 130 MG/DL (ref 70–130)
GLUCOSE BLDC GLUCOMTR-MCNC: 130 MG/DL (ref 70–130)
GLUCOSE BLDC GLUCOMTR-MCNC: 131 MG/DL (ref 70–130)
GLUCOSE BLDC GLUCOMTR-MCNC: 132 MG/DL (ref 70–130)
GLUCOSE BLDC GLUCOMTR-MCNC: 135 MG/DL (ref 70–130)
GLUCOSE BLDC GLUCOMTR-MCNC: 136 MG/DL (ref 70–130)
GLUCOSE BLDC GLUCOMTR-MCNC: 136 MG/DL (ref 70–130)
GLUCOSE BLDC GLUCOMTR-MCNC: 138 MG/DL (ref 70–130)
GLUCOSE BLDC GLUCOMTR-MCNC: 139 MG/DL (ref 70–130)
GLUCOSE BLDC GLUCOMTR-MCNC: 140 MG/DL (ref 70–130)
GLUCOSE BLDC GLUCOMTR-MCNC: 145 MG/DL (ref 70–130)
GLUCOSE BLDC GLUCOMTR-MCNC: 146 MG/DL (ref 70–130)
GLUCOSE BLDC GLUCOMTR-MCNC: 146 MG/DL (ref 70–130)
GLUCOSE BLDC GLUCOMTR-MCNC: 147 MG/DL (ref 70–130)
GLUCOSE BLDC GLUCOMTR-MCNC: 151 MG/DL (ref 70–130)
GLUCOSE BLDC GLUCOMTR-MCNC: 152 MG/DL (ref 70–130)
GLUCOSE BLDC GLUCOMTR-MCNC: 154 MG/DL (ref 70–130)
GLUCOSE BLDC GLUCOMTR-MCNC: 155 MG/DL (ref 70–130)
GLUCOSE BLDC GLUCOMTR-MCNC: 157 MG/DL (ref 70–130)
GLUCOSE BLDC GLUCOMTR-MCNC: 158 MG/DL (ref 70–130)
GLUCOSE BLDC GLUCOMTR-MCNC: 159 MG/DL (ref 70–130)
GLUCOSE BLDC GLUCOMTR-MCNC: 161 MG/DL (ref 70–130)
GLUCOSE BLDC GLUCOMTR-MCNC: 161 MG/DL (ref 70–130)
GLUCOSE BLDC GLUCOMTR-MCNC: 163 MG/DL (ref 70–130)
GLUCOSE BLDC GLUCOMTR-MCNC: 163 MG/DL (ref 70–130)
GLUCOSE BLDC GLUCOMTR-MCNC: 164 MG/DL (ref 70–130)
GLUCOSE BLDC GLUCOMTR-MCNC: 166 MG/DL (ref 70–130)
GLUCOSE BLDC GLUCOMTR-MCNC: 167 MG/DL (ref 70–130)
GLUCOSE BLDC GLUCOMTR-MCNC: 168 MG/DL (ref 70–130)
GLUCOSE BLDC GLUCOMTR-MCNC: 169 MG/DL (ref 70–130)
GLUCOSE BLDC GLUCOMTR-MCNC: 171 MG/DL (ref 70–130)
GLUCOSE BLDC GLUCOMTR-MCNC: 173 MG/DL (ref 70–130)
GLUCOSE BLDC GLUCOMTR-MCNC: 178 MG/DL (ref 70–130)
GLUCOSE BLDC GLUCOMTR-MCNC: 178 MG/DL (ref 70–130)
GLUCOSE BLDC GLUCOMTR-MCNC: 180 MG/DL (ref 70–130)
GLUCOSE BLDC GLUCOMTR-MCNC: 180 MG/DL (ref 70–130)
GLUCOSE BLDC GLUCOMTR-MCNC: 183 MG/DL (ref 70–130)
GLUCOSE BLDC GLUCOMTR-MCNC: 185 MG/DL (ref 70–130)
GLUCOSE BLDC GLUCOMTR-MCNC: 186 MG/DL (ref 70–130)
GLUCOSE BLDC GLUCOMTR-MCNC: 186 MG/DL (ref 70–130)
GLUCOSE BLDC GLUCOMTR-MCNC: 188 MG/DL (ref 70–130)
GLUCOSE BLDC GLUCOMTR-MCNC: 192 MG/DL (ref 70–130)
GLUCOSE BLDC GLUCOMTR-MCNC: 192 MG/DL (ref 70–130)
GLUCOSE BLDC GLUCOMTR-MCNC: 193 MG/DL (ref 70–130)
GLUCOSE BLDC GLUCOMTR-MCNC: 194 MG/DL (ref 70–130)
GLUCOSE BLDC GLUCOMTR-MCNC: 196 MG/DL (ref 70–130)
GLUCOSE BLDC GLUCOMTR-MCNC: 197 MG/DL (ref 70–130)
GLUCOSE BLDC GLUCOMTR-MCNC: 200 MG/DL (ref 70–130)
GLUCOSE BLDC GLUCOMTR-MCNC: 201 MG/DL (ref 70–130)
GLUCOSE BLDC GLUCOMTR-MCNC: 203 MG/DL (ref 70–130)
GLUCOSE BLDC GLUCOMTR-MCNC: 203 MG/DL (ref 70–130)
GLUCOSE BLDC GLUCOMTR-MCNC: 204 MG/DL (ref 70–130)
GLUCOSE BLDC GLUCOMTR-MCNC: 207 MG/DL (ref 70–130)
GLUCOSE BLDC GLUCOMTR-MCNC: 208 MG/DL (ref 70–130)
GLUCOSE BLDC GLUCOMTR-MCNC: 210 MG/DL (ref 70–130)
GLUCOSE BLDC GLUCOMTR-MCNC: 212 MG/DL (ref 70–130)
GLUCOSE BLDC GLUCOMTR-MCNC: 213 MG/DL (ref 70–130)
GLUCOSE BLDC GLUCOMTR-MCNC: 214 MG/DL (ref 70–130)
GLUCOSE BLDC GLUCOMTR-MCNC: 215 MG/DL (ref 70–130)
GLUCOSE BLDC GLUCOMTR-MCNC: 216 MG/DL (ref 70–130)
GLUCOSE BLDC GLUCOMTR-MCNC: 217 MG/DL (ref 70–130)
GLUCOSE BLDC GLUCOMTR-MCNC: 218 MG/DL (ref 70–130)
GLUCOSE BLDC GLUCOMTR-MCNC: 219 MG/DL (ref 70–130)
GLUCOSE BLDC GLUCOMTR-MCNC: 220 MG/DL (ref 70–130)
GLUCOSE BLDC GLUCOMTR-MCNC: 220 MG/DL (ref 70–130)
GLUCOSE BLDC GLUCOMTR-MCNC: 222 MG/DL (ref 70–130)
GLUCOSE BLDC GLUCOMTR-MCNC: 224 MG/DL (ref 70–130)
GLUCOSE BLDC GLUCOMTR-MCNC: 225 MG/DL (ref 70–130)
GLUCOSE BLDC GLUCOMTR-MCNC: 225 MG/DL (ref 70–130)
GLUCOSE BLDC GLUCOMTR-MCNC: 226 MG/DL (ref 70–130)
GLUCOSE BLDC GLUCOMTR-MCNC: 227 MG/DL (ref 70–130)
GLUCOSE BLDC GLUCOMTR-MCNC: 228 MG/DL (ref 70–130)
GLUCOSE BLDC GLUCOMTR-MCNC: 229 MG/DL (ref 70–130)
GLUCOSE BLDC GLUCOMTR-MCNC: 230 MG/DL (ref 70–130)
GLUCOSE BLDC GLUCOMTR-MCNC: 231 MG/DL (ref 70–130)
GLUCOSE BLDC GLUCOMTR-MCNC: 233 MG/DL (ref 70–130)
GLUCOSE BLDC GLUCOMTR-MCNC: 236 MG/DL (ref 70–130)
GLUCOSE BLDC GLUCOMTR-MCNC: 236 MG/DL (ref 70–130)
GLUCOSE BLDC GLUCOMTR-MCNC: 237 MG/DL (ref 70–130)
GLUCOSE BLDC GLUCOMTR-MCNC: 240 MG/DL (ref 70–130)
GLUCOSE BLDC GLUCOMTR-MCNC: 242 MG/DL (ref 70–130)
GLUCOSE BLDC GLUCOMTR-MCNC: 242 MG/DL (ref 70–130)
GLUCOSE BLDC GLUCOMTR-MCNC: 243 MG/DL (ref 70–130)
GLUCOSE BLDC GLUCOMTR-MCNC: 244 MG/DL (ref 70–130)
GLUCOSE BLDC GLUCOMTR-MCNC: 245 MG/DL (ref 70–130)
GLUCOSE BLDC GLUCOMTR-MCNC: 246 MG/DL (ref 70–130)
GLUCOSE BLDC GLUCOMTR-MCNC: 247 MG/DL (ref 70–130)
GLUCOSE BLDC GLUCOMTR-MCNC: 248 MG/DL (ref 70–130)
GLUCOSE BLDC GLUCOMTR-MCNC: 249 MG/DL (ref 70–130)
GLUCOSE BLDC GLUCOMTR-MCNC: 251 MG/DL (ref 70–130)
GLUCOSE BLDC GLUCOMTR-MCNC: 252 MG/DL (ref 70–130)
GLUCOSE BLDC GLUCOMTR-MCNC: 252 MG/DL (ref 70–130)
GLUCOSE BLDC GLUCOMTR-MCNC: 253 MG/DL (ref 70–130)
GLUCOSE BLDC GLUCOMTR-MCNC: 256 MG/DL (ref 70–130)
GLUCOSE BLDC GLUCOMTR-MCNC: 256 MG/DL (ref 70–130)
GLUCOSE BLDC GLUCOMTR-MCNC: 259 MG/DL (ref 70–130)
GLUCOSE BLDC GLUCOMTR-MCNC: 260 MG/DL (ref 70–130)
GLUCOSE BLDC GLUCOMTR-MCNC: 261 MG/DL (ref 70–130)
GLUCOSE BLDC GLUCOMTR-MCNC: 262 MG/DL (ref 70–130)
GLUCOSE BLDC GLUCOMTR-MCNC: 263 MG/DL (ref 70–130)
GLUCOSE BLDC GLUCOMTR-MCNC: 264 MG/DL (ref 70–130)
GLUCOSE BLDC GLUCOMTR-MCNC: 264 MG/DL (ref 70–130)
GLUCOSE BLDC GLUCOMTR-MCNC: 275 MG/DL (ref 70–130)
GLUCOSE BLDC GLUCOMTR-MCNC: 275 MG/DL (ref 70–130)
GLUCOSE BLDC GLUCOMTR-MCNC: 277 MG/DL (ref 70–130)
GLUCOSE BLDC GLUCOMTR-MCNC: 279 MG/DL (ref 70–130)
GLUCOSE BLDC GLUCOMTR-MCNC: 281 MG/DL (ref 70–130)
GLUCOSE BLDC GLUCOMTR-MCNC: 285 MG/DL (ref 70–130)
GLUCOSE BLDC GLUCOMTR-MCNC: 287 MG/DL (ref 70–130)
GLUCOSE BLDC GLUCOMTR-MCNC: 288 MG/DL (ref 70–130)
GLUCOSE BLDC GLUCOMTR-MCNC: 292 MG/DL (ref 70–130)
GLUCOSE BLDC GLUCOMTR-MCNC: 295 MG/DL (ref 70–130)
GLUCOSE BLDC GLUCOMTR-MCNC: 298 MG/DL (ref 70–130)
GLUCOSE BLDC GLUCOMTR-MCNC: 301 MG/DL (ref 70–130)
GLUCOSE BLDC GLUCOMTR-MCNC: 302 MG/DL (ref 70–130)
GLUCOSE BLDC GLUCOMTR-MCNC: 307 MG/DL (ref 70–130)
GLUCOSE BLDC GLUCOMTR-MCNC: 308 MG/DL (ref 70–130)
GLUCOSE BLDC GLUCOMTR-MCNC: 312 MG/DL (ref 70–130)
GLUCOSE BLDC GLUCOMTR-MCNC: 315 MG/DL (ref 70–130)
GLUCOSE BLDC GLUCOMTR-MCNC: 317 MG/DL (ref 70–130)
GLUCOSE BLDC GLUCOMTR-MCNC: 319 MG/DL (ref 70–130)
GLUCOSE BLDC GLUCOMTR-MCNC: 321 MG/DL (ref 70–130)
GLUCOSE BLDC GLUCOMTR-MCNC: 321 MG/DL (ref 70–130)
GLUCOSE BLDC GLUCOMTR-MCNC: 322 MG/DL (ref 70–130)
GLUCOSE BLDC GLUCOMTR-MCNC: 323 MG/DL (ref 70–130)
GLUCOSE BLDC GLUCOMTR-MCNC: 323 MG/DL (ref 70–130)
GLUCOSE BLDC GLUCOMTR-MCNC: 324 MG/DL (ref 70–130)
GLUCOSE BLDC GLUCOMTR-MCNC: 326 MG/DL (ref 70–130)
GLUCOSE BLDC GLUCOMTR-MCNC: 330 MG/DL (ref 70–130)
GLUCOSE BLDC GLUCOMTR-MCNC: 331 MG/DL (ref 70–130)
GLUCOSE BLDC GLUCOMTR-MCNC: 333 MG/DL (ref 70–130)
GLUCOSE BLDC GLUCOMTR-MCNC: 336 MG/DL (ref 70–130)
GLUCOSE BLDC GLUCOMTR-MCNC: 340 MG/DL (ref 70–130)
GLUCOSE BLDC GLUCOMTR-MCNC: 342 MG/DL (ref 70–130)
GLUCOSE BLDC GLUCOMTR-MCNC: 350 MG/DL (ref 70–130)
GLUCOSE BLDC GLUCOMTR-MCNC: 352 MG/DL (ref 70–130)
GLUCOSE BLDC GLUCOMTR-MCNC: 355 MG/DL (ref 70–130)
GLUCOSE BLDC GLUCOMTR-MCNC: 360 MG/DL (ref 70–130)
GLUCOSE BLDC GLUCOMTR-MCNC: 37 MG/DL (ref 70–130)
GLUCOSE BLDC GLUCOMTR-MCNC: 370 MG/DL (ref 70–130)
GLUCOSE BLDC GLUCOMTR-MCNC: 38 MG/DL (ref 70–130)
GLUCOSE BLDC GLUCOMTR-MCNC: 393 MG/DL (ref 70–130)
GLUCOSE BLDC GLUCOMTR-MCNC: 397 MG/DL (ref 70–130)
GLUCOSE BLDC GLUCOMTR-MCNC: 41 MG/DL (ref 70–130)
GLUCOSE BLDC GLUCOMTR-MCNC: 411 MG/DL (ref 70–130)
GLUCOSE BLDC GLUCOMTR-MCNC: 43 MG/DL (ref 70–130)
GLUCOSE BLDC GLUCOMTR-MCNC: 44 MG/DL (ref 70–130)
GLUCOSE BLDC GLUCOMTR-MCNC: 46 MG/DL (ref 70–130)
GLUCOSE BLDC GLUCOMTR-MCNC: 46 MG/DL (ref 70–130)
GLUCOSE BLDC GLUCOMTR-MCNC: 50 MG/DL (ref 70–130)
GLUCOSE BLDC GLUCOMTR-MCNC: 51 MG/DL (ref 70–130)
GLUCOSE BLDC GLUCOMTR-MCNC: 53 MG/DL (ref 70–130)
GLUCOSE BLDC GLUCOMTR-MCNC: 56 MG/DL (ref 70–130)
GLUCOSE BLDC GLUCOMTR-MCNC: 57 MG/DL (ref 70–130)
GLUCOSE BLDC GLUCOMTR-MCNC: 64 MG/DL (ref 70–130)
GLUCOSE BLDC GLUCOMTR-MCNC: 65 MG/DL (ref 70–130)
GLUCOSE BLDC GLUCOMTR-MCNC: 65 MG/DL (ref 70–130)
GLUCOSE BLDC GLUCOMTR-MCNC: 67 MG/DL (ref 70–130)
GLUCOSE BLDC GLUCOMTR-MCNC: 68 MG/DL (ref 70–130)
GLUCOSE BLDC GLUCOMTR-MCNC: 69 MG/DL (ref 70–130)
GLUCOSE BLDC GLUCOMTR-MCNC: 69 MG/DL (ref 70–130)
GLUCOSE BLDC GLUCOMTR-MCNC: 70 MG/DL (ref 70–130)
GLUCOSE BLDC GLUCOMTR-MCNC: 72 MG/DL (ref 70–130)
GLUCOSE BLDC GLUCOMTR-MCNC: 72 MG/DL (ref 70–130)
GLUCOSE BLDC GLUCOMTR-MCNC: 73 MG/DL (ref 70–130)
GLUCOSE BLDC GLUCOMTR-MCNC: 73 MG/DL (ref 70–130)
GLUCOSE BLDC GLUCOMTR-MCNC: 74 MG/DL (ref 70–130)
GLUCOSE BLDC GLUCOMTR-MCNC: 76 MG/DL (ref 70–130)
GLUCOSE BLDC GLUCOMTR-MCNC: 77 MG/DL (ref 70–130)
GLUCOSE BLDC GLUCOMTR-MCNC: 81 MG/DL (ref 70–130)
GLUCOSE BLDC GLUCOMTR-MCNC: 85 MG/DL (ref 70–130)
GLUCOSE BLDC GLUCOMTR-MCNC: 86 MG/DL (ref 70–130)
GLUCOSE BLDC GLUCOMTR-MCNC: 87 MG/DL (ref 70–130)
GLUCOSE BLDC GLUCOMTR-MCNC: 87 MG/DL (ref 70–130)
GLUCOSE BLDC GLUCOMTR-MCNC: 90 MG/DL (ref 70–130)
GLUCOSE BLDC GLUCOMTR-MCNC: 92 MG/DL (ref 70–130)
GLUCOSE BLDC GLUCOMTR-MCNC: 94 MG/DL (ref 70–130)
GLUCOSE BLDC GLUCOMTR-MCNC: 95 MG/DL (ref 70–130)
GLUCOSE BLDC GLUCOMTR-MCNC: 97 MG/DL (ref 70–130)
HBA1C MFR BLD: 7.9 % (ref 4.8–5.6)
HCT VFR BLD AUTO: 34.5 % (ref 37.5–51)
HCT VFR BLD AUTO: 36.2 % (ref 37.5–51)
HCT VFR BLD AUTO: 36.7 % (ref 37.5–51)
HCT VFR BLD AUTO: 38.1 % (ref 37.5–51)
HCT VFR BLD AUTO: 40.6 % (ref 37.5–51)
HCT VFR BLD AUTO: 41.8 % (ref 37.5–51)
HGB BLD-MCNC: 11.6 G/DL (ref 13–17.7)
HGB BLD-MCNC: 11.8 G/DL (ref 13–17.7)
HGB BLD-MCNC: 11.8 G/DL (ref 13–17.7)
HGB BLD-MCNC: 12.5 G/DL (ref 13–17.7)
HGB BLD-MCNC: 13.4 G/DL (ref 13–17.7)
HGB BLD-MCNC: 13.5 G/DL (ref 13–17.7)
IMM GRANULOCYTES # BLD AUTO: 0.02 10*3/MM3 (ref 0–0.05)
IMM GRANULOCYTES NFR BLD AUTO: 0.3 % (ref 0–0.5)
LACTATE HOLD SPECIMEN: NORMAL
LYMPHOCYTES # BLD AUTO: 1.58 10*3/MM3 (ref 0.7–3.1)
LYMPHOCYTES # BLD AUTO: 1.94 10*3/MM3 (ref 0.7–3.1)
LYMPHOCYTES # BLD MANUAL: 2.08 10*3/MM3 (ref 0.7–3.1)
LYMPHOCYTES NFR BLD AUTO: 10.2 % (ref 19.6–45.3)
LYMPHOCYTES NFR BLD AUTO: 33.1 % (ref 19.6–45.3)
LYMPHOCYTES NFR BLD MANUAL: 10.7 % (ref 5–12)
LYMPHOCYTES NFR BLD MANUAL: 8.7 % (ref 19.6–45.3)
MCH RBC QN AUTO: 30 PG (ref 26.6–33)
MCH RBC QN AUTO: 30.1 PG (ref 26.6–33)
MCH RBC QN AUTO: 30.2 PG (ref 26.6–33)
MCH RBC QN AUTO: 30.4 PG (ref 26.6–33)
MCH RBC QN AUTO: 30.6 PG (ref 26.6–33)
MCH RBC QN AUTO: 31.2 PG (ref 26.6–33)
MCHC RBC AUTO-ENTMCNC: 32.1 G/DL (ref 31.5–35.7)
MCHC RBC AUTO-ENTMCNC: 32.2 G/DL (ref 31.5–35.7)
MCHC RBC AUTO-ENTMCNC: 32.6 G/DL (ref 31.5–35.7)
MCHC RBC AUTO-ENTMCNC: 32.8 G/DL (ref 31.5–35.7)
MCHC RBC AUTO-ENTMCNC: 33.3 G/DL (ref 31.5–35.7)
MCHC RBC AUTO-ENTMCNC: 33.6 G/DL (ref 31.5–35.7)
MCV RBC AUTO: 90.4 FL (ref 79–97)
MCV RBC AUTO: 91.6 FL (ref 79–97)
MCV RBC AUTO: 92.7 FL (ref 79–97)
MCV RBC AUTO: 93.3 FL (ref 79–97)
MCV RBC AUTO: 94.1 FL (ref 79–97)
MCV RBC AUTO: 95.3 FL (ref 79–97)
MONOCYTES # BLD AUTO: 0.95 10*3/MM3 (ref 0.1–0.9)
MONOCYTES # BLD AUTO: 1.55 10*3/MM3 (ref 0.1–0.9)
MONOCYTES # BLD AUTO: 2.56 10*3/MM3 (ref 0.1–0.9)
MONOCYTES NFR BLD AUTO: 10 % (ref 5–12)
MONOCYTES NFR BLD AUTO: 16.2 % (ref 5–12)
MRSA DNA SPEC QL NAA+PROBE: ABNORMAL
NEUTROPHILS # BLD AUTO: 12.16 10*3/MM3 (ref 1.7–7)
NEUTROPHILS # BLD AUTO: 19.06 10*3/MM3 (ref 1.7–7)
NEUTROPHILS # BLD AUTO: 2.62 10*3/MM3 (ref 1.7–7)
NEUTROPHILS NFR BLD AUTO: 44.8 % (ref 42.7–76)
NEUTROPHILS NFR BLD AUTO: 78.1 % (ref 42.7–76)
NEUTROPHILS NFR BLD MANUAL: 79.6 % (ref 42.7–76)
NRBC BLD AUTO-RTO: 0 /100 WBC (ref 0–0.2)
NT-PROBNP SERPL-MCNC: 234.7 PG/ML (ref 5–1800)
PLAT MORPH BLD: NORMAL
PLATELET # BLD AUTO: 191 10*3/MM3 (ref 140–450)
PLATELET # BLD AUTO: 213 10*3/MM3 (ref 140–450)
PLATELET # BLD AUTO: 231 10*3/MM3 (ref 140–450)
PLATELET # BLD AUTO: 262 10*3/MM3 (ref 140–450)
PLATELET # BLD AUTO: 269 10*3/MM3 (ref 140–450)
PLATELET # BLD AUTO: 295 10*3/MM3 (ref 140–450)
PMV BLD AUTO: 10.1 FL (ref 6–12)
PMV BLD AUTO: 10.1 FL (ref 6–12)
PMV BLD AUTO: 9.1 FL (ref 6–12)
PMV BLD AUTO: 9.4 FL (ref 6–12)
POLYCHROMASIA BLD QL SMEAR: ABNORMAL
POTASSIUM BLD-SCNC: 3.5 MMOL/L (ref 3.5–5.2)
POTASSIUM BLD-SCNC: 3.8 MMOL/L (ref 3.5–5.2)
POTASSIUM BLD-SCNC: 4.1 MMOL/L (ref 3.5–5.2)
POTASSIUM BLD-SCNC: 4.1 MMOL/L (ref 3.5–5.2)
POTASSIUM BLD-SCNC: 4.2 MMOL/L (ref 3.5–5.2)
POTASSIUM BLD-SCNC: 4.2 MMOL/L (ref 3.5–5.2)
POTASSIUM BLD-SCNC: 4.4 MMOL/L (ref 3.5–5.2)
POTASSIUM BLD-SCNC: 4.4 MMOL/L (ref 3.5–5.2)
POTASSIUM BLD-SCNC: 4.7 MMOL/L (ref 3.5–5.2)
POTASSIUM BLD-SCNC: 4.8 MMOL/L (ref 3.5–5.2)
PROCALCITONIN SERPL-MCNC: 7.6 NG/ML (ref 0.1–0.25)
PROT SERPL-MCNC: 5.7 G/DL (ref 6–8.5)
PROT SERPL-MCNC: 6.4 G/DL (ref 6–8.5)
PROT SERPL-MCNC: 6.5 G/DL (ref 6–8.5)
RBC # BLD AUTO: 3.72 10*6/MM3 (ref 4.14–5.8)
RBC # BLD AUTO: 3.85 10*6/MM3 (ref 4.14–5.8)
RBC # BLD AUTO: 3.88 10*6/MM3 (ref 4.14–5.8)
RBC # BLD AUTO: 4.16 10*6/MM3 (ref 4.14–5.8)
RBC # BLD AUTO: 4.44 10*6/MM3 (ref 4.14–5.8)
RBC # BLD AUTO: 4.49 10*6/MM3 (ref 4.14–5.8)
SODIUM BLD-SCNC: 138 MMOL/L (ref 136–145)
SODIUM BLD-SCNC: 138 MMOL/L (ref 136–145)
SODIUM BLD-SCNC: 141 MMOL/L (ref 136–145)
SODIUM BLD-SCNC: 141 MMOL/L (ref 136–145)
SODIUM BLD-SCNC: 142 MMOL/L (ref 136–145)
SODIUM BLD-SCNC: 145 MMOL/L (ref 136–145)
SODIUM BLD-SCNC: 145 MMOL/L (ref 136–145)
SODIUM BLD-SCNC: 161 MMOL/L (ref 136–145)
SODIUM BLD-SCNC: 161 MMOL/L (ref 136–145)
SODIUM BLD-SCNC: 162 MMOL/L (ref 136–145)
UPPER ARTERIAL LEFT ARM BRACHIAL SYS MAX: 172 MMHG
UPPER ARTERIAL RIGHT ARM BRACHIAL SYS MAX: 167 MMHG
VALPROATE FREE SERPL-MCNC: 6.9 UG/ML (ref 6–22)
VALPROATE SERPL-MCNC: 43 MCG/ML (ref 50–125)
VALPROATE SERPL-MCNC: 44 MCG/ML (ref 50–125)
VALPROATE SERPL-MCNC: 50 MCG/ML (ref 50–125)
VANCOMYCIN SERPL-MCNC: 11.4 MCG/ML (ref 5–40)
WBC MORPH BLD: NORMAL
WBC NRBC COR # BLD: 15.55 10*3/MM3 (ref 3.4–10.8)
WBC NRBC COR # BLD: 23.94 10*3/MM3 (ref 3.4–10.8)
WBC NRBC COR # BLD: 5.86 10*3/MM3 (ref 3.4–10.8)
WBC NRBC COR # BLD: 6.73 10*3/MM3 (ref 3.4–10.8)
WBC NRBC COR # BLD: 7.17 10*3/MM3 (ref 3.4–10.8)
WBC NRBC COR # BLD: 7.17 10*3/MM3 (ref 3.4–10.8)

## 2020-01-01 PROCEDURE — 63710000001 INSULIN LISPRO (HUMAN) PER 5 UNITS: Performed by: INTERNAL MEDICINE

## 2020-01-01 PROCEDURE — 82962 GLUCOSE BLOOD TEST: CPT

## 2020-01-01 PROCEDURE — 63710000001 INSULIN LISPRO (HUMAN) PER 5 UNITS: Performed by: NURSE PRACTITIONER

## 2020-01-01 PROCEDURE — 63710000001 INSULIN GLARGINE PER 5 UNITS: Performed by: INTERNAL MEDICINE

## 2020-01-01 PROCEDURE — 71045 X-RAY EXAM CHEST 1 VIEW: CPT

## 2020-01-01 PROCEDURE — 80048 BASIC METABOLIC PNL TOTAL CA: CPT | Performed by: INTERNAL MEDICINE

## 2020-01-01 PROCEDURE — 85027 COMPLETE CBC AUTOMATED: CPT | Performed by: NURSE PRACTITIONER

## 2020-01-01 PROCEDURE — 85025 COMPLETE CBC W/AUTO DIFF WBC: CPT | Performed by: INTERNAL MEDICINE

## 2020-01-01 PROCEDURE — 80048 BASIC METABOLIC PNL TOTAL CA: CPT | Performed by: NURSE PRACTITIONER

## 2020-01-01 PROCEDURE — 25010000002 VANCOMYCIN PER 500 MG: Performed by: INTERNAL MEDICINE

## 2020-01-01 PROCEDURE — 97162 PT EVAL MOD COMPLEX 30 MIN: CPT

## 2020-01-01 PROCEDURE — 80053 COMPREHEN METABOLIC PANEL: CPT | Performed by: INTERNAL MEDICINE

## 2020-01-01 PROCEDURE — 93010 ELECTROCARDIOGRAM REPORT: CPT | Performed by: INTERNAL MEDICINE

## 2020-01-01 PROCEDURE — 25010000002 VANCOMYCIN 750 MG RECONSTITUTED SOLUTION: Performed by: INTERNAL MEDICINE

## 2020-01-01 PROCEDURE — 80164 ASSAY DIPROPYLACETIC ACD TOT: CPT | Performed by: PSYCHIATRY & NEUROLOGY

## 2020-01-01 PROCEDURE — 85007 BL SMEAR W/DIFF WBC COUNT: CPT | Performed by: INTERNAL MEDICINE

## 2020-01-01 PROCEDURE — 80053 COMPREHEN METABOLIC PANEL: CPT | Performed by: SPECIALIST

## 2020-01-01 PROCEDURE — 80202 ASSAY OF VANCOMYCIN: CPT | Performed by: INTERNAL MEDICINE

## 2020-01-01 PROCEDURE — 83036 HEMOGLOBIN GLYCOSYLATED A1C: CPT | Performed by: PSYCHIATRY & NEUROLOGY

## 2020-01-01 PROCEDURE — 80165 DIPROPYLACETIC ACID FREE: CPT | Performed by: SPECIALIST

## 2020-01-01 PROCEDURE — 83605 ASSAY OF LACTIC ACID: CPT | Performed by: INTERNAL MEDICINE

## 2020-01-01 PROCEDURE — 85027 COMPLETE CBC AUTOMATED: CPT | Performed by: PSYCHIATRY & NEUROLOGY

## 2020-01-01 PROCEDURE — 83880 ASSAY OF NATRIURETIC PEPTIDE: CPT | Performed by: NURSE PRACTITIONER

## 2020-01-01 PROCEDURE — 63710000001 INSULIN LISPRO (HUMAN) PER 5 UNITS: Performed by: SPECIALIST

## 2020-01-01 PROCEDURE — 25010000002 MORPHINE PER 10 MG: Performed by: INTERNAL MEDICINE

## 2020-01-01 PROCEDURE — 97110 THERAPEUTIC EXERCISES: CPT

## 2020-01-01 PROCEDURE — 87641 MR-STAPH DNA AMP PROBE: CPT | Performed by: INTERNAL MEDICINE

## 2020-01-01 PROCEDURE — 87040 BLOOD CULTURE FOR BACTERIA: CPT | Performed by: INTERNAL MEDICINE

## 2020-01-01 PROCEDURE — 97110 THERAPEUTIC EXERCISES: CPT | Performed by: OCCUPATIONAL THERAPIST

## 2020-01-01 PROCEDURE — 25010000002 CEFEPIME PER 500 MG: Performed by: INTERNAL MEDICINE

## 2020-01-01 PROCEDURE — 84145 PROCALCITONIN (PCT): CPT | Performed by: INTERNAL MEDICINE

## 2020-01-01 PROCEDURE — 97165 OT EVAL LOW COMPLEX 30 MIN: CPT | Performed by: OCCUPATIONAL THERAPIST

## 2020-01-01 PROCEDURE — 93005 ELECTROCARDIOGRAM TRACING: CPT | Performed by: SPECIALIST

## 2020-01-01 PROCEDURE — 93005 ELECTROCARDIOGRAM TRACING: CPT | Performed by: INTERNAL MEDICINE

## 2020-01-01 PROCEDURE — 85025 COMPLETE CBC W/AUTO DIFF WBC: CPT | Performed by: NURSE PRACTITIONER

## 2020-01-01 PROCEDURE — 80053 COMPREHEN METABOLIC PANEL: CPT | Performed by: PSYCHIATRY & NEUROLOGY

## 2020-01-01 PROCEDURE — 85027 COMPLETE CBC AUTOMATED: CPT | Performed by: SPECIALIST

## 2020-01-01 PROCEDURE — 25010000002 ENOXAPARIN PER 10 MG: Performed by: INTERNAL MEDICINE

## 2020-01-01 PROCEDURE — 92610 EVALUATE SWALLOWING FUNCTION: CPT

## 2020-01-01 PROCEDURE — 25010000003 CEFTRIAXONE PER 250 MG: Performed by: INTERNAL MEDICINE

## 2020-01-01 PROCEDURE — 80164 ASSAY DIPROPYLACETIC ACD TOT: CPT | Performed by: SPECIALIST

## 2020-01-01 RX ORDER — LOPERAMIDE HYDROCHLORIDE 2 MG/1
2 CAPSULE ORAL
Status: CANCELLED | OUTPATIENT
Start: 2020-01-01

## 2020-01-01 RX ORDER — HALOPERIDOL 1 MG/1
2 TABLET ORAL EVERY 4 HOURS PRN
Status: DISCONTINUED | OUTPATIENT
Start: 2020-01-01 | End: 2020-01-01 | Stop reason: HOSPADM

## 2020-01-01 RX ORDER — ONDANSETRON 4 MG/1
4 TABLET, ORALLY DISINTEGRATING ORAL EVERY 6 HOURS PRN
Status: DISCONTINUED | OUTPATIENT
Start: 2020-01-01 | End: 2020-01-01

## 2020-01-01 RX ORDER — INSULIN GLARGINE 100 [IU]/ML
10 INJECTION, SOLUTION SUBCUTANEOUS EVERY MORNING
Status: DISCONTINUED | OUTPATIENT
Start: 2020-01-01 | End: 2020-01-01

## 2020-01-01 RX ORDER — LORAZEPAM 2 MG/ML
0.5 INJECTION INTRAMUSCULAR
Status: DISCONTINUED | OUTPATIENT
Start: 2020-01-01 | End: 2020-01-01 | Stop reason: HOSPADM

## 2020-01-01 RX ORDER — LORAZEPAM 2 MG/ML
1 INJECTION INTRAMUSCULAR
Status: DISCONTINUED | OUTPATIENT
Start: 2020-01-01 | End: 2020-01-01 | Stop reason: HOSPADM

## 2020-01-01 RX ORDER — ACETAMINOPHEN 325 MG/1
650 TABLET ORAL EVERY 4 HOURS PRN
Status: DISCONTINUED | OUTPATIENT
Start: 2020-01-01 | End: 2020-01-01

## 2020-01-01 RX ORDER — MORPHINE SULFATE 2 MG/ML
4 INJECTION, SOLUTION INTRAMUSCULAR; INTRAVENOUS
Status: DISCONTINUED | OUTPATIENT
Start: 2020-01-01 | End: 2020-01-01 | Stop reason: HOSPADM

## 2020-01-01 RX ORDER — OLANZAPINE 10 MG/1
10 TABLET ORAL
Status: CANCELLED | OUTPATIENT
Start: 2020-01-01

## 2020-01-01 RX ORDER — ACETAMINOPHEN 325 MG/1
650 TABLET ORAL EVERY 4 HOURS PRN
Status: CANCELLED | OUTPATIENT
Start: 2020-01-01

## 2020-01-01 RX ORDER — CHOLECALCIFEROL (VITAMIN D3) 125 MCG
5 CAPSULE ORAL
Status: CANCELLED | OUTPATIENT
Start: 2020-01-01

## 2020-01-01 RX ORDER — INSULIN GLARGINE 100 [IU]/ML
10 INJECTION, SOLUTION SUBCUTANEOUS EVERY MORNING
Refills: 12
Start: 2020-01-01 | End: 2020-01-01

## 2020-01-01 RX ORDER — OLANZAPINE 7.5 MG/1
7.5 TABLET ORAL 2 TIMES DAILY
Status: DISCONTINUED | OUTPATIENT
Start: 2020-01-01 | End: 2020-01-01

## 2020-01-01 RX ORDER — ACETAMINOPHEN 325 MG/1
650 TABLET ORAL EVERY 4 HOURS PRN
Status: DISCONTINUED | OUTPATIENT
Start: 2020-01-01 | End: 2020-01-01 | Stop reason: HOSPADM

## 2020-01-01 RX ORDER — CEFTRIAXONE SODIUM 2 G/50ML
2 INJECTION, SOLUTION INTRAVENOUS EVERY 24 HOURS
Status: CANCELLED | OUTPATIENT
Start: 2020-01-01 | End: 2020-03-06

## 2020-01-01 RX ORDER — HALOPERIDOL 5 MG/1
5 TABLET ORAL 2 TIMES DAILY
Status: DISCONTINUED | OUTPATIENT
Start: 2020-01-01 | End: 2020-01-01 | Stop reason: HOSPADM

## 2020-01-01 RX ORDER — CHOLECALCIFEROL (VITAMIN D3) 125 MCG
5 CAPSULE ORAL
Status: DISCONTINUED | OUTPATIENT
Start: 2020-01-01 | End: 2020-01-01

## 2020-01-01 RX ORDER — LORAZEPAM 1 MG/1
1 TABLET ORAL EVERY 8 HOURS PRN
COMMUNITY

## 2020-01-01 RX ORDER — HALOPERIDOL 2 MG/ML
2 SOLUTION ORAL EVERY 4 HOURS PRN
Status: DISCONTINUED | OUTPATIENT
Start: 2020-01-01 | End: 2020-01-01 | Stop reason: HOSPADM

## 2020-01-01 RX ORDER — DIVALPROEX SODIUM 125 MG/1
500 CAPSULE, COATED PELLETS ORAL 3 TIMES DAILY
COMMUNITY

## 2020-01-01 RX ORDER — ALUMINA, MAGNESIA, AND SIMETHICONE 2400; 2400; 240 MG/30ML; MG/30ML; MG/30ML
15 SUSPENSION ORAL EVERY 6 HOURS PRN
Status: DISCONTINUED | OUTPATIENT
Start: 2020-01-01 | End: 2020-01-01

## 2020-01-01 RX ORDER — ACETAMINOPHEN 650 MG/1
650 SUPPOSITORY RECTAL EVERY 4 HOURS PRN
Status: DISCONTINUED | OUTPATIENT
Start: 2020-01-01 | End: 2020-01-01 | Stop reason: HOSPADM

## 2020-01-01 RX ORDER — LORAZEPAM 1 MG/1
1 TABLET ORAL 3 TIMES DAILY
Status: CANCELLED | OUTPATIENT
Start: 2020-01-01

## 2020-01-01 RX ORDER — LORAZEPAM 1 MG/1
1 TABLET ORAL 3 TIMES DAILY
Status: DISCONTINUED | OUTPATIENT
Start: 2020-01-01 | End: 2020-01-01

## 2020-01-01 RX ORDER — DEXTROSE MONOHYDRATE 25 G/50ML
25 INJECTION, SOLUTION INTRAVENOUS
Status: DISCONTINUED | OUTPATIENT
Start: 2020-01-01 | End: 2020-01-01 | Stop reason: HOSPADM

## 2020-01-01 RX ORDER — OLANZAPINE 10 MG/1
20 TABLET ORAL NIGHTLY
Status: CANCELLED | OUTPATIENT
Start: 2020-01-01

## 2020-01-01 RX ORDER — HALOPERIDOL 2 MG/ML
1 SOLUTION ORAL EVERY 4 HOURS PRN
Status: DISCONTINUED | OUTPATIENT
Start: 2020-01-01 | End: 2020-01-01 | Stop reason: HOSPADM

## 2020-01-01 RX ORDER — DIVALPROEX SODIUM 125 MG/1
500 CAPSULE, COATED PELLETS ORAL 3 TIMES DAILY
Status: CANCELLED | OUTPATIENT
Start: 2020-01-01

## 2020-01-01 RX ORDER — GABAPENTIN 100 MG/1
100 CAPSULE ORAL 3 TIMES DAILY
Status: DISCONTINUED | OUTPATIENT
Start: 2020-01-01 | End: 2020-01-01

## 2020-01-01 RX ORDER — OLANZAPINE 10 MG/1
20 TABLET ORAL NIGHTLY
Status: DISCONTINUED | OUTPATIENT
Start: 2020-01-01 | End: 2020-01-01

## 2020-01-01 RX ORDER — HALOPERIDOL 5 MG/ML
2 INJECTION INTRAMUSCULAR EVERY 4 HOURS PRN
Status: DISCONTINUED | OUTPATIENT
Start: 2020-01-01 | End: 2020-01-01 | Stop reason: HOSPADM

## 2020-01-01 RX ORDER — SCOLOPAMINE TRANSDERMAL SYSTEM 1 MG/1
1 PATCH, EXTENDED RELEASE TRANSDERMAL
Status: DISCONTINUED | OUTPATIENT
Start: 2020-01-01 | End: 2020-01-01 | Stop reason: HOSPADM

## 2020-01-01 RX ORDER — NICOTINE POLACRILEX 4 MG
15 LOZENGE BUCCAL
Status: DISCONTINUED | OUTPATIENT
Start: 2020-01-01 | End: 2020-01-01

## 2020-01-01 RX ORDER — DEXTROSE MONOHYDRATE 25 G/50ML
25 INJECTION, SOLUTION INTRAVENOUS
Status: CANCELLED | OUTPATIENT
Start: 2020-01-01

## 2020-01-01 RX ORDER — UREA 10 %
3 LOTION (ML) TOPICAL
Status: DISCONTINUED | OUTPATIENT
Start: 2020-01-01 | End: 2020-01-01

## 2020-01-01 RX ORDER — LORAZEPAM 1 MG/1
1 TABLET ORAL EVERY 6 HOURS PRN
Status: CANCELLED | OUTPATIENT
Start: 2020-01-01

## 2020-01-01 RX ORDER — NICOTINE POLACRILEX 4 MG
15 LOZENGE BUCCAL
Status: CANCELLED | OUTPATIENT
Start: 2020-01-01

## 2020-01-01 RX ORDER — HALOPERIDOL 5 MG/1
5 TABLET ORAL 2 TIMES DAILY
COMMUNITY

## 2020-01-01 RX ORDER — LORAZEPAM 1 MG/1
1 TABLET ORAL 3 TIMES DAILY
Qty: 90 TABLET | Refills: 0 | Status: SHIPPED | OUTPATIENT
Start: 2020-01-01 | End: 2020-01-01

## 2020-01-01 RX ORDER — OLANZAPINE 7.5 MG/1
15 TABLET ORAL 2 TIMES DAILY
Status: DISCONTINUED | OUTPATIENT
Start: 2020-01-01 | End: 2020-01-01

## 2020-01-01 RX ORDER — VANCOMYCIN HYDROCHLORIDE 1 G/200ML
1000 INJECTION, SOLUTION INTRAVENOUS ONCE
Status: COMPLETED | OUTPATIENT
Start: 2020-01-01 | End: 2020-01-01

## 2020-01-01 RX ORDER — HYDRALAZINE HYDROCHLORIDE 25 MG/1
25 TABLET, FILM COATED ORAL EVERY 8 HOURS PRN
Status: CANCELLED | OUTPATIENT
Start: 2020-01-01

## 2020-01-01 RX ORDER — LORAZEPAM 1 MG/1
1 TABLET ORAL 3 TIMES DAILY
Status: DISCONTINUED | OUTPATIENT
Start: 2020-01-01 | End: 2020-01-01 | Stop reason: HOSPADM

## 2020-01-01 RX ORDER — MORPHINE SULFATE 20 MG/ML
5 SOLUTION ORAL
Status: DISCONTINUED | OUTPATIENT
Start: 2020-01-01 | End: 2020-01-01 | Stop reason: HOSPADM

## 2020-01-01 RX ORDER — HALOPERIDOL 5 MG/1
5 TABLET ORAL 2 TIMES DAILY
Status: DISCONTINUED | OUTPATIENT
Start: 2020-01-01 | End: 2020-01-01

## 2020-01-01 RX ORDER — OLANZAPINE 10 MG/1
10 INJECTION, POWDER, LYOPHILIZED, FOR SOLUTION INTRAMUSCULAR EVERY 8 HOURS PRN
Status: DISCONTINUED | OUTPATIENT
Start: 2020-01-01 | End: 2020-01-01 | Stop reason: HOSPADM

## 2020-01-01 RX ORDER — LORAZEPAM 0.5 MG/1
0.5 TABLET ORAL 2 TIMES DAILY
Status: DISCONTINUED | OUTPATIENT
Start: 2020-01-01 | End: 2020-01-01

## 2020-01-01 RX ORDER — SODIUM CHLORIDE 450 MG/100ML
125 INJECTION, SOLUTION INTRAVENOUS CONTINUOUS
Status: DISCONTINUED | OUTPATIENT
Start: 2020-01-01 | End: 2020-01-01

## 2020-01-01 RX ORDER — ACETAMINOPHEN 160 MG/5ML
650 SOLUTION ORAL EVERY 4 HOURS PRN
Status: DISCONTINUED | OUTPATIENT
Start: 2020-01-01 | End: 2020-01-01 | Stop reason: HOSPADM

## 2020-01-01 RX ORDER — AMLODIPINE BESYLATE 5 MG/1
5 TABLET ORAL
Qty: 30 TABLET | Refills: 0 | Status: SHIPPED | OUTPATIENT
Start: 2020-01-01 | End: 2020-01-01

## 2020-01-01 RX ORDER — OLANZAPINE 10 MG/1
10 TABLET ORAL
COMMUNITY

## 2020-01-01 RX ORDER — DIVALPROEX SODIUM 125 MG/1
500 CAPSULE, COATED PELLETS ORAL 3 TIMES DAILY
Status: DISCONTINUED | OUTPATIENT
Start: 2020-01-01 | End: 2020-01-01

## 2020-01-01 RX ORDER — OLANZAPINE 10 MG/1
10 TABLET ORAL
Status: DISCONTINUED | OUTPATIENT
Start: 2020-01-01 | End: 2020-01-01

## 2020-01-01 RX ORDER — AMLODIPINE BESYLATE 5 MG/1
5 TABLET ORAL DAILY
COMMUNITY

## 2020-01-01 RX ORDER — DIVALPROEX SODIUM 125 MG/1
250 TABLET, DELAYED RELEASE ORAL 3 TIMES DAILY
COMMUNITY

## 2020-01-01 RX ORDER — CEFTRIAXONE SODIUM 2 G/50ML
2 INJECTION, SOLUTION INTRAVENOUS EVERY 24 HOURS
Status: DISCONTINUED | OUTPATIENT
Start: 2020-01-01 | End: 2020-01-01

## 2020-01-01 RX ORDER — DIVALPROEX SODIUM 125 MG/1
500 CAPSULE, COATED PELLETS ORAL 3 TIMES DAILY
Qty: 90 CAPSULE | Refills: 0 | Status: SHIPPED | OUTPATIENT
Start: 2020-01-01 | End: 2020-01-01

## 2020-01-01 RX ORDER — INSULIN GLARGINE 100 [IU]/ML
10 INJECTION, SOLUTION SUBCUTANEOUS EVERY MORNING
Status: DISCONTINUED | OUTPATIENT
Start: 2020-01-01 | End: 2020-01-01 | Stop reason: HOSPADM

## 2020-01-01 RX ORDER — MORPHINE SULFATE 2 MG/ML
6 INJECTION, SOLUTION INTRAMUSCULAR; INTRAVENOUS
Status: DISCONTINUED | OUTPATIENT
Start: 2020-01-01 | End: 2020-01-01 | Stop reason: HOSPADM

## 2020-01-01 RX ORDER — HYDRALAZINE HYDROCHLORIDE 25 MG/1
25 TABLET, FILM COATED ORAL EVERY 8 HOURS PRN
Status: DISCONTINUED | OUTPATIENT
Start: 2020-01-01 | End: 2020-01-01 | Stop reason: HOSPADM

## 2020-01-01 RX ORDER — SODIUM CHLORIDE 9 MG/ML
100 INJECTION, SOLUTION INTRAVENOUS CONTINUOUS
Status: DISCONTINUED | OUTPATIENT
Start: 2020-01-01 | End: 2020-01-01

## 2020-01-01 RX ORDER — LORAZEPAM 2 MG/ML
1 INJECTION INTRAMUSCULAR EVERY 4 HOURS PRN
Status: DISCONTINUED | OUTPATIENT
Start: 2020-01-01 | End: 2020-01-01

## 2020-01-01 RX ORDER — HALOPERIDOL 5 MG/ML
1 INJECTION INTRAMUSCULAR EVERY 4 HOURS PRN
Status: DISCONTINUED | OUTPATIENT
Start: 2020-01-01 | End: 2020-01-01 | Stop reason: HOSPADM

## 2020-01-01 RX ORDER — OLANZAPINE 10 MG/1
10 TABLET ORAL
Qty: 90 TABLET | Refills: 0 | Status: SHIPPED | OUTPATIENT
Start: 2020-01-01 | End: 2020-01-01

## 2020-01-01 RX ORDER — OLANZAPINE 10 MG/1
10 TABLET ORAL 2 TIMES DAILY
Status: DISCONTINUED | OUTPATIENT
Start: 2020-01-01 | End: 2020-01-01

## 2020-01-01 RX ORDER — MORPHINE SULFATE 2 MG/ML
2 INJECTION, SOLUTION INTRAMUSCULAR; INTRAVENOUS
Status: DISCONTINUED | OUTPATIENT
Start: 2020-01-01 | End: 2020-01-01 | Stop reason: HOSPADM

## 2020-01-01 RX ORDER — DIPHENOXYLATE HYDROCHLORIDE AND ATROPINE SULFATE 2.5; .025 MG/1; MG/1
1 TABLET ORAL
Status: DISCONTINUED | OUTPATIENT
Start: 2020-01-01 | End: 2020-01-01 | Stop reason: HOSPADM

## 2020-01-01 RX ORDER — OLANZAPINE 10 MG/1
10 TABLET ORAL
Status: DISCONTINUED | OUTPATIENT
Start: 2020-01-01 | End: 2020-01-01 | Stop reason: HOSPADM

## 2020-01-01 RX ORDER — VANCOMYCIN HYDROCHLORIDE 1 G/200ML
1000 INJECTION, SOLUTION INTRAVENOUS EVERY 12 HOURS
Status: DISCONTINUED | OUTPATIENT
Start: 2020-01-01 | End: 2020-01-01 | Stop reason: DRUGHIGH

## 2020-01-01 RX ORDER — DEXTROSE MONOHYDRATE 25 G/50ML
25 INJECTION, SOLUTION INTRAVENOUS
Status: DISCONTINUED | OUTPATIENT
Start: 2020-01-01 | End: 2020-01-01

## 2020-01-01 RX ORDER — ONDANSETRON 4 MG/1
4 TABLET, ORALLY DISINTEGRATING ORAL EVERY 6 HOURS PRN
Status: CANCELLED | OUTPATIENT
Start: 2020-01-01

## 2020-01-01 RX ORDER — HALOPERIDOL 5 MG/1
5 TABLET ORAL 2 TIMES DAILY
Qty: 60 TABLET | Refills: 0 | Status: SHIPPED | OUTPATIENT
Start: 2020-01-01 | End: 2020-01-01

## 2020-01-01 RX ORDER — LORAZEPAM 1 MG/1
1 TABLET ORAL EVERY 6 HOURS PRN
Status: DISCONTINUED | OUTPATIENT
Start: 2020-01-01 | End: 2020-01-01

## 2020-01-01 RX ORDER — MORPHINE SULFATE 20 MG/ML
20 SOLUTION ORAL
Status: DISCONTINUED | OUTPATIENT
Start: 2020-01-01 | End: 2020-01-01 | Stop reason: HOSPADM

## 2020-01-01 RX ORDER — CHOLECALCIFEROL (VITAMIN D3) 125 MCG
5 CAPSULE ORAL
Status: DISCONTINUED | OUTPATIENT
Start: 2020-01-01 | End: 2020-01-01 | Stop reason: HOSPADM

## 2020-01-01 RX ORDER — HYDROMORPHONE HYDROCHLORIDE 1 MG/ML
0.5 INJECTION, SOLUTION INTRAMUSCULAR; INTRAVENOUS; SUBCUTANEOUS
Status: DISCONTINUED | OUTPATIENT
Start: 2020-01-01 | End: 2020-01-01 | Stop reason: HOSPADM

## 2020-01-01 RX ORDER — LORAZEPAM 2 MG/ML
2 INJECTION INTRAMUSCULAR
Status: DISCONTINUED | OUTPATIENT
Start: 2020-01-01 | End: 2020-01-01 | Stop reason: HOSPADM

## 2020-01-01 RX ORDER — ALUMINA, MAGNESIA, AND SIMETHICONE 2400; 2400; 240 MG/30ML; MG/30ML; MG/30ML
15 SUSPENSION ORAL EVERY 6 HOURS PRN
Status: CANCELLED | OUTPATIENT
Start: 2020-01-01

## 2020-01-01 RX ORDER — OLANZAPINE 5 MG/1
5 TABLET ORAL 2 TIMES DAILY
Status: DISCONTINUED | OUTPATIENT
Start: 2020-01-01 | End: 2020-01-01

## 2020-01-01 RX ORDER — LORAZEPAM 2 MG/ML
2 CONCENTRATE ORAL
Status: DISCONTINUED | OUTPATIENT
Start: 2020-01-01 | End: 2020-01-01 | Stop reason: HOSPADM

## 2020-01-01 RX ORDER — INSULIN GLARGINE 100 [IU]/ML
10 INJECTION, SOLUTION SUBCUTANEOUS EVERY MORNING
Status: CANCELLED | OUTPATIENT
Start: 2020-01-01

## 2020-01-01 RX ORDER — LORAZEPAM 2 MG/ML
0.5 CONCENTRATE ORAL
Status: DISCONTINUED | OUTPATIENT
Start: 2020-01-01 | End: 2020-01-01 | Stop reason: HOSPADM

## 2020-01-01 RX ORDER — MORPHINE SULFATE 20 MG/ML
10 SOLUTION ORAL
Status: DISCONTINUED | OUTPATIENT
Start: 2020-01-01 | End: 2020-01-01 | Stop reason: HOSPADM

## 2020-01-01 RX ORDER — HALOPERIDOL 5 MG/1
5 TABLET ORAL 2 TIMES DAILY
Status: CANCELLED | OUTPATIENT
Start: 2020-01-01

## 2020-01-01 RX ORDER — ACETAMINOPHEN 650 MG/1
650 SUPPOSITORY RECTAL EVERY 4 HOURS PRN
Status: DISCONTINUED | OUTPATIENT
Start: 2020-01-01 | End: 2020-01-01

## 2020-01-01 RX ORDER — HYDRALAZINE HYDROCHLORIDE 25 MG/1
25 TABLET, FILM COATED ORAL EVERY 8 HOURS PRN
Status: DISCONTINUED | OUTPATIENT
Start: 2020-01-01 | End: 2020-01-01

## 2020-01-01 RX ORDER — NICOTINE POLACRILEX 4 MG
15 LOZENGE BUCCAL
Status: DISCONTINUED | OUTPATIENT
Start: 2020-01-01 | End: 2020-01-01 | Stop reason: HOSPADM

## 2020-01-01 RX ORDER — AMLODIPINE BESYLATE 5 MG/1
5 TABLET ORAL
Status: DISCONTINUED | OUTPATIENT
Start: 2020-01-01 | End: 2020-01-01 | Stop reason: HOSPADM

## 2020-01-01 RX ORDER — HALOPERIDOL 1 MG/1
1 TABLET ORAL EVERY 4 HOURS PRN
Status: DISCONTINUED | OUTPATIENT
Start: 2020-01-01 | End: 2020-01-01 | Stop reason: HOSPADM

## 2020-01-01 RX ORDER — LORAZEPAM 2 MG/ML
1 CONCENTRATE ORAL
Status: DISCONTINUED | OUTPATIENT
Start: 2020-01-01 | End: 2020-01-01 | Stop reason: HOSPADM

## 2020-01-01 RX ORDER — INSULIN GLARGINE 100 [IU]/ML
10 INJECTION, SOLUTION SUBCUTANEOUS EVERY MORNING
COMMUNITY

## 2020-01-01 RX ORDER — LOPERAMIDE HYDROCHLORIDE 2 MG/1
2 CAPSULE ORAL
Status: DISCONTINUED | OUTPATIENT
Start: 2020-01-01 | End: 2020-01-01

## 2020-01-01 RX ORDER — DEXTROSE MONOHYDRATE 50 MG/ML
150 INJECTION, SOLUTION INTRAVENOUS CONTINUOUS
Status: DISCONTINUED | OUTPATIENT
Start: 2020-01-01 | End: 2020-01-01

## 2020-01-01 RX ORDER — SODIUM CHLORIDE 9 MG/ML
100 INJECTION, SOLUTION INTRAVENOUS CONTINUOUS
Status: CANCELLED | OUTPATIENT
Start: 2020-01-01

## 2020-01-01 RX ORDER — OLANZAPINE 10 MG/1
20 TABLET ORAL NIGHTLY
Status: DISCONTINUED | OUTPATIENT
Start: 2020-01-01 | End: 2020-01-01 | Stop reason: HOSPADM

## 2020-01-01 RX ADMIN — DEXTROMETHORPHAN HYDROBROMIDE AND QUINIDINE SULFATE 1 CAPSULE: 20; 10 CAPSULE, GELATIN COATED ORAL at 10:42

## 2020-01-01 RX ADMIN — HALOPERIDOL 5 MG: 5 TABLET ORAL at 11:03

## 2020-01-01 RX ADMIN — DIVALPROEX SODIUM 500 MG: 125 CAPSULE, COATED PELLETS ORAL at 16:17

## 2020-01-01 RX ADMIN — OLANZAPINE 10 MG: 10 TABLET, FILM COATED ORAL at 09:15

## 2020-01-01 RX ADMIN — INSULIN LISPRO 3 UNITS: 100 INJECTION, SOLUTION INTRAVENOUS; SUBCUTANEOUS at 08:30

## 2020-01-01 RX ADMIN — INSULIN LISPRO 3 UNITS: 100 INJECTION, SOLUTION INTRAVENOUS; SUBCUTANEOUS at 08:20

## 2020-01-01 RX ADMIN — METOPROLOL TARTRATE 12.5 MG: 25 TABLET ORAL at 08:32

## 2020-01-01 RX ADMIN — DIVALPROEX SODIUM 500 MG: 125 CAPSULE, COATED PELLETS ORAL at 09:53

## 2020-01-01 RX ADMIN — INSULIN LISPRO 3 UNITS: 100 INJECTION, SOLUTION INTRAVENOUS; SUBCUTANEOUS at 09:15

## 2020-01-01 RX ADMIN — INSULIN LISPRO 3 UNITS: 100 INJECTION, SOLUTION INTRAVENOUS; SUBCUTANEOUS at 10:04

## 2020-01-01 RX ADMIN — HALOPERIDOL 5 MG: 5 TABLET ORAL at 20:02

## 2020-01-01 RX ADMIN — INSULIN GLARGINE 10 UNITS: 100 INJECTION, SOLUTION SUBCUTANEOUS at 10:08

## 2020-01-01 RX ADMIN — METOPROLOL TARTRATE 12.5 MG: 25 TABLET ORAL at 10:41

## 2020-01-01 RX ADMIN — METOPROLOL TARTRATE 12.5 MG: 25 TABLET ORAL at 09:53

## 2020-01-01 RX ADMIN — Medication 3 MG: at 18:30

## 2020-01-01 RX ADMIN — INSULIN LISPRO 2 UNITS: 100 INJECTION, SOLUTION INTRAVENOUS; SUBCUTANEOUS at 17:19

## 2020-01-01 RX ADMIN — INSULIN LISPRO 3 UNITS: 100 INJECTION, SOLUTION INTRAVENOUS; SUBCUTANEOUS at 13:19

## 2020-01-01 RX ADMIN — INSULIN LISPRO 1 UNITS: 100 INJECTION, SOLUTION INTRAVENOUS; SUBCUTANEOUS at 08:45

## 2020-01-01 RX ADMIN — DIVALPROEX SODIUM 500 MG: 125 CAPSULE, COATED PELLETS ORAL at 08:29

## 2020-01-01 RX ADMIN — DIVALPROEX SODIUM 500 MG: 125 CAPSULE, COATED PELLETS ORAL at 20:17

## 2020-01-01 RX ADMIN — ACETAMINOPHEN 650 MG: 325 TABLET, FILM COATED ORAL at 20:45

## 2020-01-01 RX ADMIN — ACETAMINOPHEN 650 MG: 650 SUPPOSITORY RECTAL at 19:56

## 2020-01-01 RX ADMIN — OLANZAPINE 10 MG: 10 TABLET, FILM COATED ORAL at 17:39

## 2020-01-01 RX ADMIN — OLANZAPINE 20 MG: 7.5 TABLET ORAL at 20:25

## 2020-01-01 RX ADMIN — INSULIN LISPRO 4 UNITS: 100 INJECTION, SOLUTION INTRAVENOUS; SUBCUTANEOUS at 20:05

## 2020-01-01 RX ADMIN — DIVALPROEX SODIUM 500 MG: 125 CAPSULE, COATED PELLETS ORAL at 10:48

## 2020-01-01 RX ADMIN — INSULIN LISPRO 3 UNITS: 100 INJECTION, SOLUTION INTRAVENOUS; SUBCUTANEOUS at 08:31

## 2020-01-01 RX ADMIN — INSULIN LISPRO 2 UNITS: 100 INJECTION, SOLUTION INTRAVENOUS; SUBCUTANEOUS at 12:29

## 2020-01-01 RX ADMIN — OLANZAPINE 10 MG: 10 TABLET, FILM COATED ORAL at 17:25

## 2020-01-01 RX ADMIN — METOPROLOL TARTRATE 12.5 MG: 25 TABLET ORAL at 20:01

## 2020-01-01 RX ADMIN — INSULIN LISPRO 3 UNITS: 100 INJECTION, SOLUTION INTRAVENOUS; SUBCUTANEOUS at 12:34

## 2020-01-01 RX ADMIN — METOPROLOL TARTRATE 12.5 MG: 25 TABLET ORAL at 20:23

## 2020-01-01 RX ADMIN — AMLODIPINE BESYLATE 5 MG: 5 TABLET ORAL at 08:25

## 2020-01-01 RX ADMIN — INSULIN GLARGINE 10 UNITS: 100 INJECTION, SOLUTION SUBCUTANEOUS at 10:15

## 2020-01-01 RX ADMIN — METOPROLOL TARTRATE 12.5 MG: 25 TABLET ORAL at 21:06

## 2020-01-01 RX ADMIN — DIVALPROEX SODIUM 500 MG: 125 CAPSULE, COATED PELLETS ORAL at 17:17

## 2020-01-01 RX ADMIN — OLANZAPINE 20 MG: 7.5 TABLET ORAL at 20:36

## 2020-01-01 RX ADMIN — INSULIN GLARGINE 10 UNITS: 100 INJECTION, SOLUTION SUBCUTANEOUS at 08:24

## 2020-01-01 RX ADMIN — VANCOMYCIN HYDROCHLORIDE 1000 MG: 1 INJECTION, SOLUTION INTRAVENOUS at 23:31

## 2020-01-01 RX ADMIN — DIVALPROEX SODIUM 500 MG: 125 CAPSULE, COATED PELLETS ORAL at 17:23

## 2020-01-01 RX ADMIN — OLANZAPINE 10 MG: 10 TABLET, FILM COATED ORAL at 20:43

## 2020-01-01 RX ADMIN — Medication 5 MG: at 17:30

## 2020-01-01 RX ADMIN — Medication 5 MG: at 17:17

## 2020-01-01 RX ADMIN — LORAZEPAM 1 MG: 1 TABLET ORAL at 17:30

## 2020-01-01 RX ADMIN — METOPROLOL TARTRATE 12.5 MG: 25 TABLET ORAL at 09:13

## 2020-01-01 RX ADMIN — OLANZAPINE 10 MG: 10 TABLET, FILM COATED ORAL at 17:53

## 2020-01-01 RX ADMIN — INSULIN LISPRO 3 UNITS: 100 INJECTION, SOLUTION INTRAVENOUS; SUBCUTANEOUS at 14:15

## 2020-01-01 RX ADMIN — OLANZAPINE 10 MG: 10 TABLET, FILM COATED ORAL at 17:11

## 2020-01-01 RX ADMIN — DEXTROMETHORPHAN HYDROBROMIDE AND QUINIDINE SULFATE 1 CAPSULE: 20; 10 CAPSULE, GELATIN COATED ORAL at 21:10

## 2020-01-01 RX ADMIN — INSULIN LISPRO 1 UNITS: 100 INJECTION, SOLUTION INTRAVENOUS; SUBCUTANEOUS at 10:47

## 2020-01-01 RX ADMIN — INSULIN LISPRO 4 UNITS: 100 INJECTION, SOLUTION INTRAVENOUS; SUBCUTANEOUS at 20:42

## 2020-01-01 RX ADMIN — LORAZEPAM 1 MG: 1 TABLET ORAL at 11:04

## 2020-01-01 RX ADMIN — DIVALPROEX SODIUM 500 MG: 125 CAPSULE, COATED PELLETS ORAL at 10:39

## 2020-01-01 RX ADMIN — ACETAMINOPHEN 650 MG: 325 TABLET, FILM COATED ORAL at 20:51

## 2020-01-01 RX ADMIN — CEFEPIME HYDROCHLORIDE 2 G: 2 INJECTION, POWDER, FOR SOLUTION INTRAVENOUS at 12:50

## 2020-01-01 RX ADMIN — DIVALPROEX SODIUM 500 MG: 125 CAPSULE, COATED PELLETS ORAL at 20:20

## 2020-01-01 RX ADMIN — DIVALPROEX SODIUM 500 MG: 125 CAPSULE, COATED PELLETS ORAL at 20:14

## 2020-01-01 RX ADMIN — DIVALPROEX SODIUM 500 MG: 125 CAPSULE, COATED PELLETS ORAL at 11:04

## 2020-01-01 RX ADMIN — OLANZAPINE 10 MG: 10 TABLET, FILM COATED ORAL at 17:33

## 2020-01-01 RX ADMIN — OLANZAPINE 10 MG: 10 TABLET, FILM COATED ORAL at 17:31

## 2020-01-01 RX ADMIN — LORAZEPAM 1 MG: 1 TABLET ORAL at 21:10

## 2020-01-01 RX ADMIN — INSULIN LISPRO 3 UNITS: 100 INJECTION, SOLUTION INTRAVENOUS; SUBCUTANEOUS at 12:15

## 2020-01-01 RX ADMIN — METOPROLOL TARTRATE 12.5 MG: 25 TABLET ORAL at 08:10

## 2020-01-01 RX ADMIN — OLANZAPINE 10 MG: 10 TABLET, FILM COATED ORAL at 17:32

## 2020-01-01 RX ADMIN — Medication 5 MG: at 17:32

## 2020-01-01 RX ADMIN — METOPROLOL TARTRATE 12.5 MG: 25 TABLET ORAL at 20:19

## 2020-01-01 RX ADMIN — METOPROLOL TARTRATE 12.5 MG: 25 TABLET ORAL at 09:01

## 2020-01-01 RX ADMIN — OLANZAPINE 20 MG: 7.5 TABLET ORAL at 20:40

## 2020-01-01 RX ADMIN — INSULIN LISPRO 3 UNITS: 100 INJECTION, SOLUTION INTRAVENOUS; SUBCUTANEOUS at 12:36

## 2020-01-01 RX ADMIN — INSULIN LISPRO 3 UNITS: 100 INJECTION, SOLUTION INTRAVENOUS; SUBCUTANEOUS at 10:45

## 2020-01-01 RX ADMIN — DEXTROMETHORPHAN HYDROBROMIDE AND QUINIDINE SULFATE 1 CAPSULE: 20; 10 CAPSULE, GELATIN COATED ORAL at 13:21

## 2020-01-01 RX ADMIN — INSULIN LISPRO 2 UNITS: 100 INJECTION, SOLUTION INTRAVENOUS; SUBCUTANEOUS at 20:07

## 2020-01-01 RX ADMIN — DEXTROMETHORPHAN HYDROBROMIDE AND QUINIDINE SULFATE 1 CAPSULE: 20; 10 CAPSULE, GELATIN COATED ORAL at 20:57

## 2020-01-01 RX ADMIN — INSULIN GLARGINE 10 UNITS: 100 INJECTION, SOLUTION SUBCUTANEOUS at 10:39

## 2020-01-01 RX ADMIN — ACETAMINOPHEN 650 MG: 325 TABLET, FILM COATED ORAL at 20:35

## 2020-01-01 RX ADMIN — METOPROLOL TARTRATE 12.5 MG: 25 TABLET ORAL at 10:35

## 2020-01-01 RX ADMIN — LORAZEPAM 1 MG: 1 TABLET ORAL at 20:29

## 2020-01-01 RX ADMIN — LORAZEPAM 1 MG: 1 TABLET ORAL at 20:03

## 2020-01-01 RX ADMIN — INSULIN LISPRO 3 UNITS: 100 INJECTION, SOLUTION INTRAVENOUS; SUBCUTANEOUS at 10:17

## 2020-01-01 RX ADMIN — DEXTROMETHORPHAN HYDROBROMIDE AND QUINIDINE SULFATE 1 CAPSULE: 20; 10 CAPSULE, GELATIN COATED ORAL at 20:51

## 2020-01-01 RX ADMIN — LORAZEPAM 1 MG: 1 TABLET ORAL at 20:12

## 2020-01-01 RX ADMIN — METOPROLOL TARTRATE 12.5 MG: 25 TABLET ORAL at 08:24

## 2020-01-01 RX ADMIN — DIVALPROEX SODIUM 500 MG: 125 CAPSULE, COATED PELLETS ORAL at 20:06

## 2020-01-01 RX ADMIN — HALOPERIDOL 5 MG: 5 TABLET ORAL at 20:29

## 2020-01-01 RX ADMIN — METOPROLOL TARTRATE 12.5 MG: 25 TABLET ORAL at 20:09

## 2020-01-01 RX ADMIN — METOPROLOL TARTRATE 12.5 MG: 25 TABLET ORAL at 09:15

## 2020-01-01 RX ADMIN — ACETAMINOPHEN 650 MG: 325 TABLET, FILM COATED ORAL at 20:43

## 2020-01-01 RX ADMIN — ACETAMINOPHEN 650 MG: 325 TABLET, FILM COATED ORAL at 13:24

## 2020-01-01 RX ADMIN — DIVALPROEX SODIUM 500 MG: 125 CAPSULE, COATED PELLETS ORAL at 20:36

## 2020-01-01 RX ADMIN — INSULIN LISPRO 3 UNITS: 100 INJECTION, SOLUTION INTRAVENOUS; SUBCUTANEOUS at 17:15

## 2020-01-01 RX ADMIN — DIVALPROEX SODIUM 500 MG: 125 CAPSULE, COATED PELLETS ORAL at 20:48

## 2020-01-01 RX ADMIN — OLANZAPINE 10 MG: 10 TABLET, FILM COATED ORAL at 17:30

## 2020-01-01 RX ADMIN — DEXTROMETHORPHAN HYDROBROMIDE AND QUINIDINE SULFATE 1 CAPSULE: 20; 10 CAPSULE, GELATIN COATED ORAL at 10:35

## 2020-01-01 RX ADMIN — ACETAMINOPHEN 650 MG: 325 TABLET, FILM COATED ORAL at 11:12

## 2020-01-01 RX ADMIN — HALOPERIDOL 5 MG: 5 TABLET ORAL at 09:31

## 2020-01-01 RX ADMIN — LORAZEPAM 0.5 MG: 0.5 TABLET ORAL at 20:55

## 2020-01-01 RX ADMIN — METOPROLOL TARTRATE 12.5 MG: 25 TABLET ORAL at 20:58

## 2020-01-01 RX ADMIN — METOPROLOL TARTRATE 12.5 MG: 25 TABLET ORAL at 08:19

## 2020-01-01 RX ADMIN — METOPROLOL TARTRATE 12.5 MG: 25 TABLET ORAL at 21:04

## 2020-01-01 RX ADMIN — INSULIN LISPRO 1 UNITS: 100 INJECTION, SOLUTION INTRAVENOUS; SUBCUTANEOUS at 12:24

## 2020-01-01 RX ADMIN — METOPROLOL TARTRATE 12.5 MG: 25 TABLET ORAL at 20:21

## 2020-01-01 RX ADMIN — Medication 5 MG: at 17:22

## 2020-01-01 RX ADMIN — DEXTROMETHORPHAN HYDROBROMIDE AND QUINIDINE SULFATE 1 CAPSULE: 20; 10 CAPSULE, GELATIN COATED ORAL at 20:22

## 2020-01-01 RX ADMIN — OLANZAPINE 10 MG: 10 TABLET, FILM COATED ORAL at 08:21

## 2020-01-01 RX ADMIN — DEXTROMETHORPHAN HYDROBROMIDE AND QUINIDINE SULFATE 1 CAPSULE: 20; 10 CAPSULE, GELATIN COATED ORAL at 12:21

## 2020-01-01 RX ADMIN — OLANZAPINE 5 MG: 5 TABLET, ORALLY DISINTEGRATING ORAL at 09:16

## 2020-01-01 RX ADMIN — LORAZEPAM 0.5 MG: 0.5 TABLET ORAL at 20:29

## 2020-01-01 RX ADMIN — OLANZAPINE 10 MG: 10 TABLET, FILM COATED ORAL at 17:13

## 2020-01-01 RX ADMIN — HALOPERIDOL 5 MG: 5 TABLET ORAL at 10:45

## 2020-01-01 RX ADMIN — OLANZAPINE 20 MG: 7.5 TABLET ORAL at 20:57

## 2020-01-01 RX ADMIN — LORAZEPAM 0.5 MG: 0.5 TABLET ORAL at 08:40

## 2020-01-01 RX ADMIN — Medication 3 MG: at 18:36

## 2020-01-01 RX ADMIN — DEXTROMETHORPHAN HYDROBROMIDE AND QUINIDINE SULFATE 1 CAPSULE: 20; 10 CAPSULE, GELATIN COATED ORAL at 15:27

## 2020-01-01 RX ADMIN — INSULIN GLARGINE 15 UNITS: 100 INJECTION, SOLUTION SUBCUTANEOUS at 09:17

## 2020-01-01 RX ADMIN — Medication 5 MG: at 17:25

## 2020-01-01 RX ADMIN — INSULIN GLARGINE 15 UNITS: 100 INJECTION, SOLUTION SUBCUTANEOUS at 12:01

## 2020-01-01 RX ADMIN — HALOPERIDOL 5 MG: 5 TABLET ORAL at 20:40

## 2020-01-01 RX ADMIN — OLANZAPINE 20 MG: 7.5 TABLET ORAL at 20:52

## 2020-01-01 RX ADMIN — GABAPENTIN 100 MG: 100 CAPSULE ORAL at 08:35

## 2020-01-01 RX ADMIN — DIVALPROEX SODIUM 500 MG: 125 CAPSULE, COATED PELLETS ORAL at 17:31

## 2020-01-01 RX ADMIN — OLANZAPINE 10 MG: 10 TABLET, FILM COATED ORAL at 19:08

## 2020-01-01 RX ADMIN — DEXTROMETHORPHAN HYDROBROMIDE AND QUINIDINE SULFATE 1 CAPSULE: 20; 10 CAPSULE, GELATIN COATED ORAL at 20:35

## 2020-01-01 RX ADMIN — INSULIN LISPRO 3 UNITS: 100 INJECTION, SOLUTION INTRAVENOUS; SUBCUTANEOUS at 08:16

## 2020-01-01 RX ADMIN — LORAZEPAM 1 MG: 1 TABLET ORAL at 08:13

## 2020-01-01 RX ADMIN — METOPROLOL TARTRATE 12.5 MG: 25 TABLET ORAL at 22:27

## 2020-01-01 RX ADMIN — OLANZAPINE 20 MG: 7.5 TABLET ORAL at 20:41

## 2020-01-01 RX ADMIN — INSULIN LISPRO 4 UNITS: 100 INJECTION, SOLUTION INTRAVENOUS; SUBCUTANEOUS at 20:38

## 2020-01-01 RX ADMIN — ONDANSETRON 4 MG: 4 TABLET, ORALLY DISINTEGRATING ORAL at 14:17

## 2020-01-01 RX ADMIN — INSULIN LISPRO 3 UNITS: 100 INJECTION, SOLUTION INTRAVENOUS; SUBCUTANEOUS at 08:25

## 2020-01-01 RX ADMIN — DEXTROMETHORPHAN HYDROBROMIDE AND QUINIDINE SULFATE 1 CAPSULE: 20; 10 CAPSULE, GELATIN COATED ORAL at 09:00

## 2020-01-01 RX ADMIN — LORAZEPAM 1 MG: 1 TABLET ORAL at 20:21

## 2020-01-01 RX ADMIN — METOPROLOL TARTRATE 12.5 MG: 25 TABLET ORAL at 12:22

## 2020-01-01 RX ADMIN — LORAZEPAM 1 MG: 1 TABLET ORAL at 20:40

## 2020-01-01 RX ADMIN — Medication 5 MG: at 18:27

## 2020-01-01 RX ADMIN — METOPROLOL TARTRATE 12.5 MG: 25 TABLET ORAL at 20:39

## 2020-01-01 RX ADMIN — Medication 5 MG: at 18:14

## 2020-01-01 RX ADMIN — INSULIN LISPRO 3 UNITS: 100 INJECTION, SOLUTION INTRAVENOUS; SUBCUTANEOUS at 12:28

## 2020-01-01 RX ADMIN — OLANZAPINE 20 MG: 7.5 TABLET ORAL at 22:00

## 2020-01-01 RX ADMIN — LORAZEPAM 1 MG: 1 TABLET ORAL at 18:23

## 2020-01-01 RX ADMIN — DIVALPROEX SODIUM 500 MG: 125 CAPSULE, COATED PELLETS ORAL at 08:21

## 2020-01-01 RX ADMIN — INSULIN LISPRO 1 UNITS: 100 INJECTION, SOLUTION INTRAVENOUS; SUBCUTANEOUS at 20:43

## 2020-01-01 RX ADMIN — Medication 5 MG: at 17:39

## 2020-01-01 RX ADMIN — DIVALPROEX SODIUM 500 MG: 125 CAPSULE, COATED PELLETS ORAL at 16:54

## 2020-01-01 RX ADMIN — DIVALPROEX SODIUM 500 MG: 125 CAPSULE, COATED PELLETS ORAL at 20:29

## 2020-01-01 RX ADMIN — INSULIN LISPRO 2 UNITS: 100 INJECTION, SOLUTION INTRAVENOUS; SUBCUTANEOUS at 20:23

## 2020-01-01 RX ADMIN — METOPROLOL TARTRATE 12.5 MG: 25 TABLET ORAL at 15:01

## 2020-01-01 RX ADMIN — DIVALPROEX SODIUM 500 MG: 125 CAPSULE, COATED PELLETS ORAL at 15:18

## 2020-01-01 RX ADMIN — INSULIN LISPRO 3 UNITS: 100 INJECTION, SOLUTION INTRAVENOUS; SUBCUTANEOUS at 13:05

## 2020-01-01 RX ADMIN — DIVALPROEX SODIUM 500 MG: 125 CAPSULE, COATED PELLETS ORAL at 20:23

## 2020-01-01 RX ADMIN — DIVALPROEX SODIUM 500 MG: 125 CAPSULE, COATED PELLETS ORAL at 10:07

## 2020-01-01 RX ADMIN — METOPROLOL TARTRATE 12.5 MG: 25 TABLET ORAL at 09:31

## 2020-01-01 RX ADMIN — DEXTROMETHORPHAN HYDROBROMIDE AND QUINIDINE SULFATE 1 CAPSULE: 20; 10 CAPSULE, GELATIN COATED ORAL at 14:48

## 2020-01-01 RX ADMIN — DEXTROMETHORPHAN HYDROBROMIDE AND QUINIDINE SULFATE 1 CAPSULE: 20; 10 CAPSULE, GELATIN COATED ORAL at 22:27

## 2020-01-01 RX ADMIN — DIVALPROEX SODIUM 500 MG: 125 CAPSULE, COATED PELLETS ORAL at 12:48

## 2020-01-01 RX ADMIN — DIVALPROEX SODIUM 500 MG: 125 CAPSULE, COATED PELLETS ORAL at 20:13

## 2020-01-01 RX ADMIN — DEXTROMETHORPHAN HYDROBROMIDE AND QUINIDINE SULFATE 1 CAPSULE: 20; 10 CAPSULE, GELATIN COATED ORAL at 20:29

## 2020-01-01 RX ADMIN — INSULIN GLARGINE 10 UNITS: 100 INJECTION, SOLUTION SUBCUTANEOUS at 13:12

## 2020-01-01 RX ADMIN — DIVALPROEX SODIUM 500 MG: 125 CAPSULE, COATED PELLETS ORAL at 20:38

## 2020-01-01 RX ADMIN — DIVALPROEX SODIUM 500 MG: 125 CAPSULE, COATED PELLETS ORAL at 08:42

## 2020-01-01 RX ADMIN — MORPHINE SULFATE 2 MG: 2 INJECTION, SOLUTION INTRAMUSCULAR; INTRAVENOUS at 15:59

## 2020-01-01 RX ADMIN — OLANZAPINE 20 MG: 7.5 TABLET ORAL at 20:09

## 2020-01-01 RX ADMIN — HALOPERIDOL 5 MG: 5 TABLET ORAL at 20:09

## 2020-01-01 RX ADMIN — HALOPERIDOL 5 MG: 5 TABLET ORAL at 08:36

## 2020-01-01 RX ADMIN — DEXTROMETHORPHAN HYDROBROMIDE AND QUINIDINE SULFATE 1 CAPSULE: 20; 10 CAPSULE, GELATIN COATED ORAL at 09:19

## 2020-01-01 RX ADMIN — SODIUM CHLORIDE 1725 ML: 9 INJECTION, SOLUTION INTRAVENOUS at 17:57

## 2020-01-01 RX ADMIN — INSULIN LISPRO 3 UNITS: 100 INJECTION, SOLUTION INTRAVENOUS; SUBCUTANEOUS at 13:30

## 2020-01-01 RX ADMIN — INSULIN LISPRO 3 UNITS: 100 INJECTION, SOLUTION INTRAVENOUS; SUBCUTANEOUS at 12:10

## 2020-01-01 RX ADMIN — METOPROLOL TARTRATE 12.5 MG: 25 TABLET ORAL at 20:13

## 2020-01-01 RX ADMIN — Medication 5 MG: at 17:44

## 2020-01-01 RX ADMIN — OLANZAPINE 20 MG: 7.5 TABLET ORAL at 20:29

## 2020-01-01 RX ADMIN — DIVALPROEX SODIUM 500 MG: 125 CAPSULE, COATED PELLETS ORAL at 09:06

## 2020-01-01 RX ADMIN — DIVALPROEX SODIUM 500 MG: 125 CAPSULE, COATED PELLETS ORAL at 10:59

## 2020-01-01 RX ADMIN — METOPROLOL TARTRATE 12.5 MG: 25 TABLET ORAL at 10:19

## 2020-01-01 RX ADMIN — DEXTROMETHORPHAN HYDROBROMIDE AND QUINIDINE SULFATE 1 CAPSULE: 20; 10 CAPSULE, GELATIN COATED ORAL at 08:42

## 2020-01-01 RX ADMIN — DIVALPROEX SODIUM 500 MG: 125 CAPSULE, COATED PELLETS ORAL at 20:35

## 2020-01-01 RX ADMIN — OLANZAPINE 20 MG: 7.5 TABLET ORAL at 20:20

## 2020-01-01 RX ADMIN — INSULIN LISPRO 3 UNITS: 100 INJECTION, SOLUTION INTRAVENOUS; SUBCUTANEOUS at 12:43

## 2020-01-01 RX ADMIN — HALOPERIDOL 5 MG: 5 TABLET ORAL at 20:39

## 2020-01-01 RX ADMIN — INSULIN LISPRO 3 UNITS: 100 INJECTION, SOLUTION INTRAVENOUS; SUBCUTANEOUS at 09:08

## 2020-01-01 RX ADMIN — DIVALPROEX SODIUM 500 MG: 125 CAPSULE, COATED PELLETS ORAL at 10:05

## 2020-01-01 RX ADMIN — METOPROLOL TARTRATE 12.5 MG: 25 TABLET ORAL at 13:11

## 2020-01-01 RX ADMIN — OLANZAPINE 15 MG: 7.5 TABLET ORAL at 20:45

## 2020-01-01 RX ADMIN — METOPROLOL TARTRATE 12.5 MG: 25 TABLET ORAL at 20:29

## 2020-01-01 RX ADMIN — LORAZEPAM 1 MG: 1 TABLET ORAL at 20:39

## 2020-01-01 RX ADMIN — LORAZEPAM 1 MG: 1 TABLET ORAL at 17:16

## 2020-01-01 RX ADMIN — OLANZAPINE 20 MG: 7.5 TABLET ORAL at 20:13

## 2020-01-01 RX ADMIN — INSULIN LISPRO 1 UNITS: 100 INJECTION, SOLUTION INTRAVENOUS; SUBCUTANEOUS at 20:35

## 2020-01-01 RX ADMIN — METOPROLOL TARTRATE 12.5 MG: 25 TABLET ORAL at 20:11

## 2020-01-01 RX ADMIN — INSULIN LISPRO 3 UNITS: 100 INJECTION, SOLUTION INTRAVENOUS; SUBCUTANEOUS at 08:27

## 2020-01-01 RX ADMIN — INSULIN LISPRO 2 UNITS: 100 INJECTION, SOLUTION INTRAVENOUS; SUBCUTANEOUS at 17:28

## 2020-01-01 RX ADMIN — METOPROLOL TARTRATE 12.5 MG: 25 TABLET ORAL at 09:06

## 2020-01-01 RX ADMIN — LORAZEPAM 0.5 MG: 0.5 TABLET ORAL at 20:09

## 2020-01-01 RX ADMIN — INSULIN LISPRO 3 UNITS: 100 INJECTION, SOLUTION INTRAVENOUS; SUBCUTANEOUS at 12:29

## 2020-01-01 RX ADMIN — DEXTROMETHORPHAN HYDROBROMIDE AND QUINIDINE SULFATE 1 CAPSULE: 20; 10 CAPSULE, GELATIN COATED ORAL at 08:43

## 2020-01-01 RX ADMIN — INSULIN LISPRO 3 UNITS: 100 INJECTION, SOLUTION INTRAVENOUS; SUBCUTANEOUS at 12:38

## 2020-01-01 RX ADMIN — INSULIN GLARGINE 10 UNITS: 100 INJECTION, SOLUTION SUBCUTANEOUS at 09:15

## 2020-01-01 RX ADMIN — INSULIN LISPRO 3 UNITS: 100 INJECTION, SOLUTION INTRAVENOUS; SUBCUTANEOUS at 13:35

## 2020-01-01 RX ADMIN — LORAZEPAM 0.5 MG: 0.5 TABLET ORAL at 20:37

## 2020-01-01 RX ADMIN — HALOPERIDOL 5 MG: 5 TABLET ORAL at 20:58

## 2020-01-01 RX ADMIN — DIVALPROEX SODIUM 500 MG: 125 CAPSULE, COATED PELLETS ORAL at 17:19

## 2020-01-01 RX ADMIN — METOPROLOL TARTRATE 12.5 MG: 25 TABLET ORAL at 08:12

## 2020-01-01 RX ADMIN — OLANZAPINE 10 MG: 10 TABLET, FILM COATED ORAL at 17:55

## 2020-01-01 RX ADMIN — OLANZAPINE 10 MG: 10 TABLET, FILM COATED ORAL at 18:36

## 2020-01-01 RX ADMIN — DIVALPROEX SODIUM 500 MG: 125 CAPSULE, COATED PELLETS ORAL at 17:39

## 2020-01-01 RX ADMIN — INSULIN LISPRO 2 UNITS: 100 INJECTION, SOLUTION INTRAVENOUS; SUBCUTANEOUS at 08:23

## 2020-01-01 RX ADMIN — METOPROLOL TARTRATE 12.5 MG: 25 TABLET ORAL at 20:49

## 2020-01-01 RX ADMIN — INSULIN GLARGINE 10 UNITS: 100 INJECTION, SOLUTION SUBCUTANEOUS at 09:25

## 2020-01-01 RX ADMIN — LORAZEPAM 1 MG: 1 TABLET ORAL at 10:42

## 2020-01-01 RX ADMIN — DIVALPROEX SODIUM 500 MG: 125 CAPSULE, COATED PELLETS ORAL at 20:22

## 2020-01-01 RX ADMIN — INSULIN LISPRO 4 UNITS: 100 INJECTION, SOLUTION INTRAVENOUS; SUBCUTANEOUS at 12:02

## 2020-01-01 RX ADMIN — INSULIN LISPRO 2 UNITS: 100 INJECTION, SOLUTION INTRAVENOUS; SUBCUTANEOUS at 20:09

## 2020-01-01 RX ADMIN — INSULIN LISPRO 3 UNITS: 100 INJECTION, SOLUTION INTRAVENOUS; SUBCUTANEOUS at 12:14

## 2020-01-01 RX ADMIN — INSULIN LISPRO 3 UNITS: 100 INJECTION, SOLUTION INTRAVENOUS; SUBCUTANEOUS at 09:35

## 2020-01-01 RX ADMIN — DIVALPROEX SODIUM 500 MG: 125 CAPSULE, COATED PELLETS ORAL at 20:00

## 2020-01-01 RX ADMIN — DIVALPROEX SODIUM 500 MG: 125 CAPSULE, COATED PELLETS ORAL at 16:00

## 2020-01-01 RX ADMIN — ACETAMINOPHEN 650 MG: 325 TABLET, FILM COATED ORAL at 21:08

## 2020-01-01 RX ADMIN — Medication 3 MG: at 18:47

## 2020-01-01 RX ADMIN — DEXTROMETHORPHAN HYDROBROMIDE AND QUINIDINE SULFATE 1 CAPSULE: 20; 10 CAPSULE, GELATIN COATED ORAL at 09:20

## 2020-01-01 RX ADMIN — DIVALPROEX SODIUM 500 MG: 125 CAPSULE, COATED PELLETS ORAL at 08:23

## 2020-01-01 RX ADMIN — DEXTROSE 15 G: 15 GEL ORAL at 08:01

## 2020-01-01 RX ADMIN — METOPROLOL TARTRATE 12.5 MG: 25 TABLET ORAL at 08:36

## 2020-01-01 RX ADMIN — LORAZEPAM 1 MG: 1 TABLET ORAL at 20:51

## 2020-01-01 RX ADMIN — LORAZEPAM 0.5 MG: 0.5 TABLET ORAL at 21:36

## 2020-01-01 RX ADMIN — DIVALPROEX SODIUM 500 MG: 125 CAPSULE, COATED PELLETS ORAL at 20:40

## 2020-01-01 RX ADMIN — ACETAMINOPHEN 650 MG: 325 TABLET, FILM COATED ORAL at 20:29

## 2020-01-01 RX ADMIN — Medication 5 MG: at 18:51

## 2020-01-01 RX ADMIN — INSULIN GLARGINE 10 UNITS: 100 INJECTION, SOLUTION SUBCUTANEOUS at 08:43

## 2020-01-01 RX ADMIN — METOPROLOL TARTRATE 12.5 MG: 25 TABLET ORAL at 11:23

## 2020-01-01 RX ADMIN — LORAZEPAM 1 MG: 1 TABLET ORAL at 21:18

## 2020-01-01 RX ADMIN — INSULIN LISPRO 1 UNITS: 100 INJECTION, SOLUTION INTRAVENOUS; SUBCUTANEOUS at 15:14

## 2020-01-01 RX ADMIN — DIVALPROEX SODIUM 500 MG: 125 CAPSULE, COATED PELLETS ORAL at 22:00

## 2020-01-01 RX ADMIN — LORAZEPAM 0.5 MG: 0.5 TABLET ORAL at 20:22

## 2020-01-01 RX ADMIN — OLANZAPINE 15 MG: 7.5 TABLET ORAL at 09:10

## 2020-01-01 RX ADMIN — METOPROLOL TARTRATE 12.5 MG: 25 TABLET ORAL at 20:43

## 2020-01-01 RX ADMIN — LORAZEPAM 0.5 MG: 0.5 TABLET ORAL at 20:19

## 2020-01-01 RX ADMIN — METOPROLOL TARTRATE 12.5 MG: 25 TABLET ORAL at 20:06

## 2020-01-01 RX ADMIN — METOPROLOL TARTRATE 12.5 MG: 25 TABLET ORAL at 10:46

## 2020-01-01 RX ADMIN — DIVALPROEX SODIUM 500 MG: 125 CAPSULE, COATED PELLETS ORAL at 17:02

## 2020-01-01 RX ADMIN — DIVALPROEX SODIUM 500 MG: 125 CAPSULE, COATED PELLETS ORAL at 20:31

## 2020-01-01 RX ADMIN — Medication 5 MG: at 17:29

## 2020-01-01 RX ADMIN — INSULIN LISPRO 2 UNITS: 100 INJECTION, SOLUTION INTRAVENOUS; SUBCUTANEOUS at 18:42

## 2020-01-01 RX ADMIN — METOPROLOL TARTRATE 12.5 MG: 25 TABLET ORAL at 20:35

## 2020-01-01 RX ADMIN — INSULIN LISPRO 5 UNITS: 100 INJECTION, SOLUTION INTRAVENOUS; SUBCUTANEOUS at 13:17

## 2020-01-01 RX ADMIN — INSULIN LISPRO 3 UNITS: 100 INJECTION, SOLUTION INTRAVENOUS; SUBCUTANEOUS at 20:46

## 2020-01-01 RX ADMIN — CEFTRIAXONE SODIUM 2 G: 2 INJECTION, SOLUTION INTRAVENOUS at 21:42

## 2020-01-01 RX ADMIN — DIVALPROEX SODIUM 500 MG: 125 CAPSULE, COATED PELLETS ORAL at 16:59

## 2020-01-01 RX ADMIN — DIVALPROEX SODIUM 500 MG: 125 CAPSULE, COATED PELLETS ORAL at 10:35

## 2020-01-01 RX ADMIN — HALOPERIDOL 5 MG: 5 TABLET ORAL at 21:36

## 2020-01-01 RX ADMIN — LORAZEPAM 1 MG: 1 TABLET ORAL at 21:11

## 2020-01-01 RX ADMIN — INSULIN LISPRO 1 UNITS: 100 INJECTION, SOLUTION INTRAVENOUS; SUBCUTANEOUS at 17:55

## 2020-01-01 RX ADMIN — ENOXAPARIN SODIUM 40 MG: 40 INJECTION SUBCUTANEOUS at 19:53

## 2020-01-01 RX ADMIN — DIVALPROEX SODIUM 500 MG: 125 CAPSULE, COATED PELLETS ORAL at 21:10

## 2020-01-01 RX ADMIN — METOPROLOL TARTRATE 12.5 MG: 25 TABLET ORAL at 08:37

## 2020-01-01 RX ADMIN — HALOPERIDOL 5 MG: 5 TABLET ORAL at 08:13

## 2020-01-01 RX ADMIN — OLANZAPINE 10 MG: 10 TABLET, FILM COATED ORAL at 18:23

## 2020-01-01 RX ADMIN — LORAZEPAM 1 MG: 1 TABLET ORAL at 08:25

## 2020-01-01 RX ADMIN — METOPROLOL TARTRATE 12.5 MG: 25 TABLET ORAL at 08:23

## 2020-01-01 RX ADMIN — INSULIN GLARGINE 10 UNITS: 100 INJECTION, SOLUTION SUBCUTANEOUS at 11:05

## 2020-01-01 RX ADMIN — INSULIN LISPRO 3 UNITS: 100 INJECTION, SOLUTION INTRAVENOUS; SUBCUTANEOUS at 12:30

## 2020-01-01 RX ADMIN — HALOPERIDOL 5 MG: 5 TABLET ORAL at 21:03

## 2020-01-01 RX ADMIN — INSULIN LISPRO 1 UNITS: 100 INJECTION, SOLUTION INTRAVENOUS; SUBCUTANEOUS at 12:38

## 2020-01-01 RX ADMIN — HALOPERIDOL 5 MG: 5 TABLET ORAL at 10:25

## 2020-01-01 RX ADMIN — METOPROLOL TARTRATE 12.5 MG: 25 TABLET ORAL at 20:48

## 2020-01-01 RX ADMIN — LORAZEPAM 0.5 MG: 0.5 TABLET ORAL at 08:07

## 2020-01-01 RX ADMIN — DIVALPROEX SODIUM 500 MG: 125 CAPSULE, COATED PELLETS ORAL at 16:43

## 2020-01-01 RX ADMIN — INSULIN LISPRO 1 UNITS: 100 INJECTION, SOLUTION INTRAVENOUS; SUBCUTANEOUS at 20:04

## 2020-01-01 RX ADMIN — OLANZAPINE 10 MG: 10 TABLET, FILM COATED ORAL at 17:08

## 2020-01-01 RX ADMIN — Medication 5 MG: at 17:26

## 2020-01-01 RX ADMIN — INSULIN LISPRO 3 UNITS: 100 INJECTION, SOLUTION INTRAVENOUS; SUBCUTANEOUS at 17:36

## 2020-01-01 RX ADMIN — DIVALPROEX SODIUM 500 MG: 125 CAPSULE, COATED PELLETS ORAL at 17:18

## 2020-01-01 RX ADMIN — INSULIN LISPRO 3 UNITS: 100 INJECTION, SOLUTION INTRAVENOUS; SUBCUTANEOUS at 13:16

## 2020-01-01 RX ADMIN — DEXTROMETHORPHAN HYDROBROMIDE AND QUINIDINE SULFATE 1 CAPSULE: 20; 10 CAPSULE, GELATIN COATED ORAL at 09:52

## 2020-01-01 RX ADMIN — INSULIN LISPRO 2 UNITS: 100 INJECTION, SOLUTION INTRAVENOUS; SUBCUTANEOUS at 17:20

## 2020-01-01 RX ADMIN — OLANZAPINE 10 MG: 10 TABLET, FILM COATED ORAL at 16:33

## 2020-01-01 RX ADMIN — LORAZEPAM 1 MG: 1 TABLET ORAL at 10:17

## 2020-01-01 RX ADMIN — DIVALPROEX SODIUM 500 MG: 125 CAPSULE, COATED PELLETS ORAL at 09:17

## 2020-01-01 RX ADMIN — OLANZAPINE 10 MG: 10 TABLET, FILM COATED ORAL at 17:19

## 2020-01-01 RX ADMIN — DIVALPROEX SODIUM 500 MG: 125 CAPSULE, COATED PELLETS ORAL at 20:46

## 2020-01-01 RX ADMIN — HALOPERIDOL 5 MG: 5 TABLET ORAL at 20:03

## 2020-01-01 RX ADMIN — DIVALPROEX SODIUM 500 MG: 125 CAPSULE, COATED PELLETS ORAL at 15:22

## 2020-01-01 RX ADMIN — DIVALPROEX SODIUM 500 MG: 125 CAPSULE, COATED PELLETS ORAL at 16:33

## 2020-01-01 RX ADMIN — DEXTROMETHORPHAN HYDROBROMIDE AND QUINIDINE SULFATE 1 CAPSULE: 20; 10 CAPSULE, GELATIN COATED ORAL at 10:17

## 2020-01-01 RX ADMIN — DIVALPROEX SODIUM 500 MG: 125 CAPSULE, COATED PELLETS ORAL at 20:51

## 2020-01-01 RX ADMIN — DIVALPROEX SODIUM 500 MG: 125 CAPSULE, COATED PELLETS ORAL at 09:00

## 2020-01-01 RX ADMIN — INSULIN LISPRO 2 UNITS: 100 INJECTION, SOLUTION INTRAVENOUS; SUBCUTANEOUS at 08:32

## 2020-01-01 RX ADMIN — LORAZEPAM 1 MG: 1 TABLET ORAL at 17:27

## 2020-01-01 RX ADMIN — INSULIN LISPRO 3 UNITS: 100 INJECTION, SOLUTION INTRAVENOUS; SUBCUTANEOUS at 13:01

## 2020-01-01 RX ADMIN — OLANZAPINE 10 MG: 10 TABLET, FILM COATED ORAL at 08:23

## 2020-01-01 RX ADMIN — OLANZAPINE 20 MG: 7.5 TABLET ORAL at 21:01

## 2020-01-01 RX ADMIN — OLANZAPINE 20 MG: 7.5 TABLET ORAL at 20:04

## 2020-01-01 RX ADMIN — DIVALPROEX SODIUM 500 MG: 125 CAPSULE, COATED PELLETS ORAL at 17:26

## 2020-01-01 RX ADMIN — DIVALPROEX SODIUM 500 MG: 125 CAPSULE, COATED PELLETS ORAL at 20:04

## 2020-01-01 RX ADMIN — OLANZAPINE 15 MG: 7.5 TABLET ORAL at 10:46

## 2020-01-01 RX ADMIN — LORAZEPAM 1 MG: 1 TABLET ORAL at 17:24

## 2020-01-01 RX ADMIN — DEXTROMETHORPHAN HYDROBROMIDE AND QUINIDINE SULFATE 1 CAPSULE: 20; 10 CAPSULE, GELATIN COATED ORAL at 09:06

## 2020-01-01 RX ADMIN — LORAZEPAM 1 MG: 1 TABLET ORAL at 16:01

## 2020-01-01 RX ADMIN — HALOPERIDOL 5 MG: 5 TABLET ORAL at 10:22

## 2020-01-01 RX ADMIN — DIVALPROEX SODIUM 500 MG: 125 CAPSULE, COATED PELLETS ORAL at 17:13

## 2020-01-01 RX ADMIN — INSULIN LISPRO 5 UNITS: 100 INJECTION, SOLUTION INTRAVENOUS; SUBCUTANEOUS at 17:34

## 2020-01-01 RX ADMIN — INSULIN LISPRO 3 UNITS: 100 INJECTION, SOLUTION INTRAVENOUS; SUBCUTANEOUS at 08:13

## 2020-01-01 RX ADMIN — METOPROLOL TARTRATE 12.5 MG: 25 TABLET ORAL at 09:20

## 2020-01-01 RX ADMIN — GABAPENTIN 100 MG: 100 CAPSULE ORAL at 12:34

## 2020-01-01 RX ADMIN — DIVALPROEX SODIUM 500 MG: 125 CAPSULE, COATED PELLETS ORAL at 17:29

## 2020-01-01 RX ADMIN — AMLODIPINE BESYLATE 5 MG: 5 TABLET ORAL at 10:11

## 2020-01-01 RX ADMIN — METOPROLOL TARTRATE 12.5 MG: 25 TABLET ORAL at 20:14

## 2020-01-01 RX ADMIN — OLANZAPINE 10 MG: 10 TABLET, FILM COATED ORAL at 18:21

## 2020-01-01 RX ADMIN — INSULIN GLARGINE 10 UNITS: 100 INJECTION, SOLUTION SUBCUTANEOUS at 10:45

## 2020-01-01 RX ADMIN — METOPROLOL TARTRATE 12.5 MG: 25 TABLET ORAL at 10:04

## 2020-01-01 RX ADMIN — OLANZAPINE 10 MG: 10 TABLET, FILM COATED ORAL at 18:34

## 2020-01-01 RX ADMIN — METOPROLOL TARTRATE 12.5 MG: 25 TABLET ORAL at 09:17

## 2020-01-01 RX ADMIN — LORAZEPAM 0.5 MG: 0.5 TABLET ORAL at 20:01

## 2020-01-01 RX ADMIN — LORAZEPAM 0.5 MG: 0.5 TABLET ORAL at 09:31

## 2020-01-01 RX ADMIN — METOPROLOL TARTRATE 12.5 MG: 25 TABLET ORAL at 08:29

## 2020-01-01 RX ADMIN — METOPROLOL TARTRATE 12.5 MG: 25 TABLET ORAL at 12:34

## 2020-01-01 RX ADMIN — OLANZAPINE 20 MG: 7.5 TABLET ORAL at 20:50

## 2020-01-01 RX ADMIN — HALOPERIDOL 5 MG: 5 TABLET ORAL at 21:10

## 2020-01-01 RX ADMIN — INSULIN GLARGINE 10 UNITS: 100 INJECTION, SOLUTION SUBCUTANEOUS at 08:19

## 2020-01-01 RX ADMIN — METOPROLOL TARTRATE 12.5 MG: 25 TABLET ORAL at 10:58

## 2020-01-01 RX ADMIN — METOPROLOL TARTRATE 12.5 MG: 25 TABLET ORAL at 08:07

## 2020-01-01 RX ADMIN — INSULIN LISPRO 3 UNITS: 100 INJECTION, SOLUTION INTRAVENOUS; SUBCUTANEOUS at 17:22

## 2020-01-01 RX ADMIN — HALOPERIDOL 5 MG: 5 TABLET ORAL at 22:26

## 2020-01-01 RX ADMIN — INSULIN LISPRO 3 UNITS: 100 INJECTION, SOLUTION INTRAVENOUS; SUBCUTANEOUS at 12:23

## 2020-01-01 RX ADMIN — DEXTROMETHORPHAN HYDROBROMIDE AND QUINIDINE SULFATE 1 CAPSULE: 20; 10 CAPSULE, GELATIN COATED ORAL at 20:48

## 2020-01-01 RX ADMIN — LORAZEPAM 1 MG: 1 TABLET ORAL at 22:02

## 2020-01-01 RX ADMIN — LORAZEPAM 1 MG: 1 TABLET ORAL at 10:12

## 2020-01-01 RX ADMIN — OLANZAPINE 20 MG: 7.5 TABLET ORAL at 20:39

## 2020-01-01 RX ADMIN — INSULIN LISPRO 3 UNITS: 100 INJECTION, SOLUTION INTRAVENOUS; SUBCUTANEOUS at 10:53

## 2020-01-01 RX ADMIN — DIVALPROEX SODIUM 500 MG: 125 CAPSULE, COATED PELLETS ORAL at 20:57

## 2020-01-01 RX ADMIN — DIVALPROEX SODIUM 500 MG: 125 CAPSULE, COATED PELLETS ORAL at 12:23

## 2020-01-01 RX ADMIN — INSULIN GLARGINE 10 UNITS: 100 INJECTION, SOLUTION SUBCUTANEOUS at 10:43

## 2020-01-01 RX ADMIN — INSULIN GLARGINE 10 UNITS: 100 INJECTION, SOLUTION SUBCUTANEOUS at 08:03

## 2020-01-01 RX ADMIN — DIVALPROEX SODIUM 500 MG: 125 CAPSULE, COATED PELLETS ORAL at 21:01

## 2020-01-01 RX ADMIN — INSULIN LISPRO 3 UNITS: 100 INJECTION, SOLUTION INTRAVENOUS; SUBCUTANEOUS at 20:19

## 2020-01-01 RX ADMIN — METOPROLOL TARTRATE 12.5 MG: 25 TABLET ORAL at 10:29

## 2020-01-01 RX ADMIN — LORAZEPAM 0.5 MG: 0.5 TABLET ORAL at 21:02

## 2020-01-01 RX ADMIN — INSULIN GLARGINE 10 UNITS: 100 INJECTION, SOLUTION SUBCUTANEOUS at 10:48

## 2020-01-01 RX ADMIN — DIVALPROEX SODIUM 500 MG: 125 CAPSULE, COATED PELLETS ORAL at 20:15

## 2020-01-01 RX ADMIN — OLANZAPINE 10 MG: 10 TABLET, FILM COATED ORAL at 17:24

## 2020-01-01 RX ADMIN — OLANZAPINE 10 MG: 10 TABLET, FILM COATED ORAL at 08:29

## 2020-01-01 RX ADMIN — DIVALPROEX SODIUM 500 MG: 125 CAPSULE, COATED PELLETS ORAL at 08:18

## 2020-01-01 RX ADMIN — ACETAMINOPHEN 650 MG: 325 TABLET, FILM COATED ORAL at 20:39

## 2020-01-01 RX ADMIN — INSULIN LISPRO 1 UNITS: 100 INJECTION, SOLUTION INTRAVENOUS; SUBCUTANEOUS at 17:42

## 2020-01-01 RX ADMIN — LORAZEPAM 0.5 MG: 0.5 TABLET ORAL at 08:23

## 2020-01-01 RX ADMIN — METOPROLOL TARTRATE 12.5 MG: 25 TABLET ORAL at 21:08

## 2020-01-01 RX ADMIN — INSULIN LISPRO 1 UNITS: 100 INJECTION, SOLUTION INTRAVENOUS; SUBCUTANEOUS at 08:22

## 2020-01-01 RX ADMIN — OLANZAPINE 20 MG: 7.5 TABLET ORAL at 20:22

## 2020-01-01 RX ADMIN — OLANZAPINE 20 MG: 7.5 TABLET ORAL at 20:12

## 2020-01-01 RX ADMIN — OLANZAPINE 20 MG: 7.5 TABLET ORAL at 20:23

## 2020-01-01 RX ADMIN — DIVALPROEX SODIUM 500 MG: 125 CAPSULE, COATED PELLETS ORAL at 08:10

## 2020-01-01 RX ADMIN — OLANZAPINE 10 MG: 10 INJECTION, POWDER, FOR SOLUTION INTRAMUSCULAR at 21:52

## 2020-01-01 RX ADMIN — METOPROLOL TARTRATE 12.5 MG: 25 TABLET ORAL at 10:05

## 2020-01-01 RX ADMIN — INSULIN LISPRO 1 UNITS: 100 INJECTION, SOLUTION INTRAVENOUS; SUBCUTANEOUS at 20:22

## 2020-01-01 RX ADMIN — OLANZAPINE 20 MG: 7.5 TABLET ORAL at 20:01

## 2020-01-01 RX ADMIN — HALOPERIDOL 5 MG: 5 TABLET ORAL at 16:00

## 2020-01-01 RX ADMIN — LORAZEPAM 0.5 MG: 0.5 TABLET ORAL at 20:24

## 2020-01-01 RX ADMIN — LORAZEPAM 1 MG: 1 TABLET ORAL at 12:22

## 2020-01-01 RX ADMIN — SODIUM CHLORIDE 100 ML/HR: 9 INJECTION, SOLUTION INTRAVENOUS at 23:00

## 2020-01-01 RX ADMIN — INSULIN LISPRO 1 UNITS: 100 INJECTION, SOLUTION INTRAVENOUS; SUBCUTANEOUS at 20:36

## 2020-01-01 RX ADMIN — INSULIN LISPRO 1 UNITS: 100 INJECTION, SOLUTION INTRAVENOUS; SUBCUTANEOUS at 17:07

## 2020-01-01 RX ADMIN — OLANZAPINE 7.5 MG: 7.5 TABLET ORAL at 20:36

## 2020-01-01 RX ADMIN — INSULIN LISPRO 1 UNITS: 100 INJECTION, SOLUTION INTRAVENOUS; SUBCUTANEOUS at 12:30

## 2020-01-01 RX ADMIN — DIVALPROEX SODIUM 500 MG: 125 CAPSULE, COATED PELLETS ORAL at 10:18

## 2020-01-01 RX ADMIN — HALOPERIDOL 5 MG: 5 TABLET ORAL at 10:35

## 2020-01-01 RX ADMIN — OLANZAPINE 10 MG: 10 TABLET, FILM COATED ORAL at 20:29

## 2020-01-01 RX ADMIN — OLANZAPINE 10 MG: 10 TABLET, FILM COATED ORAL at 09:22

## 2020-01-01 RX ADMIN — DIVALPROEX SODIUM 500 MG: 125 CAPSULE, COATED PELLETS ORAL at 08:59

## 2020-01-01 RX ADMIN — DEXTROMETHORPHAN HYDROBROMIDE AND QUINIDINE SULFATE 1 CAPSULE: 20; 10 CAPSULE, GELATIN COATED ORAL at 20:36

## 2020-01-01 RX ADMIN — DIVALPROEX SODIUM 500 MG: 125 CAPSULE, COATED PELLETS ORAL at 08:38

## 2020-01-01 RX ADMIN — GABAPENTIN 100 MG: 100 CAPSULE ORAL at 20:45

## 2020-01-01 RX ADMIN — DIVALPROEX SODIUM 500 MG: 125 CAPSULE, COATED PELLETS ORAL at 20:25

## 2020-01-01 RX ADMIN — OLANZAPINE 20 MG: 7.5 TABLET ORAL at 20:58

## 2020-01-01 RX ADMIN — Medication 5 MG: at 17:27

## 2020-01-01 RX ADMIN — INSULIN LISPRO 3 UNITS: 100 INJECTION, SOLUTION INTRAVENOUS; SUBCUTANEOUS at 08:36

## 2020-01-01 RX ADMIN — AMLODIPINE BESYLATE 5 MG: 5 TABLET ORAL at 10:38

## 2020-01-01 RX ADMIN — DIVALPROEX SODIUM 500 MG: 125 CAPSULE, COATED PELLETS ORAL at 20:11

## 2020-01-01 RX ADMIN — DIVALPROEX SODIUM 500 MG: 125 CAPSULE, COATED PELLETS ORAL at 16:44

## 2020-01-01 RX ADMIN — OLANZAPINE 10 MG: 10 TABLET, FILM COATED ORAL at 18:14

## 2020-01-01 RX ADMIN — DIVALPROEX SODIUM 500 MG: 125 CAPSULE, COATED PELLETS ORAL at 08:12

## 2020-01-01 RX ADMIN — DIVALPROEX SODIUM 500 MG: 125 CAPSULE, COATED PELLETS ORAL at 09:22

## 2020-01-01 RX ADMIN — LORAZEPAM 1 MG: 1 TABLET ORAL at 18:03

## 2020-01-01 RX ADMIN — METOPROLOL TARTRATE 12.5 MG: 25 TABLET ORAL at 08:18

## 2020-01-01 RX ADMIN — LORAZEPAM 0.5 MG: 0.5 TABLET ORAL at 08:21

## 2020-01-01 RX ADMIN — Medication 5 MG: at 18:15

## 2020-01-01 RX ADMIN — DEXTROSE MONOHYDRATE 150 ML/HR: 50 INJECTION, SOLUTION INTRAVENOUS at 10:16

## 2020-01-01 RX ADMIN — LORAZEPAM 1 MG: 1 TABLET ORAL at 10:50

## 2020-01-01 RX ADMIN — DIVALPROEX SODIUM 500 MG: 125 CAPSULE, COATED PELLETS ORAL at 21:15

## 2020-01-01 RX ADMIN — OLANZAPINE 7.5 MG: 7.5 TABLET ORAL at 10:59

## 2020-01-01 RX ADMIN — Medication 5 MG: at 17:55

## 2020-01-01 RX ADMIN — HALOPERIDOL 5 MG: 5 TABLET ORAL at 08:25

## 2020-01-01 RX ADMIN — HALOPERIDOL 5 MG: 5 TABLET ORAL at 10:07

## 2020-01-01 RX ADMIN — INSULIN LISPRO 3 UNITS: 100 INJECTION, SOLUTION INTRAVENOUS; SUBCUTANEOUS at 12:35

## 2020-01-01 RX ADMIN — DEXTROMETHORPHAN HYDROBROMIDE AND QUINIDINE SULFATE 1 CAPSULE: 20; 10 CAPSULE, GELATIN COATED ORAL at 20:02

## 2020-01-01 RX ADMIN — DIVALPROEX SODIUM 500 MG: 125 CAPSULE, COATED PELLETS ORAL at 09:11

## 2020-01-01 RX ADMIN — INSULIN LISPRO 3 UNITS: 100 INJECTION, SOLUTION INTRAVENOUS; SUBCUTANEOUS at 12:22

## 2020-01-01 RX ADMIN — HALOPERIDOL 5 MG: 5 TABLET ORAL at 09:21

## 2020-01-01 RX ADMIN — LORAZEPAM 1 MG: 1 TABLET ORAL at 10:34

## 2020-01-01 RX ADMIN — OLANZAPINE 15 MG: 7.5 TABLET ORAL at 10:04

## 2020-01-01 RX ADMIN — INSULIN LISPRO 3 UNITS: 100 INJECTION, SOLUTION INTRAVENOUS; SUBCUTANEOUS at 13:08

## 2020-01-01 RX ADMIN — OLANZAPINE 20 MG: 7.5 TABLET ORAL at 21:07

## 2020-01-01 RX ADMIN — AMLODIPINE BESYLATE 5 MG: 5 TABLET ORAL at 10:07

## 2020-01-01 RX ADMIN — INSULIN GLARGINE 10 UNITS: 100 INJECTION, SOLUTION SUBCUTANEOUS at 08:07

## 2020-01-01 RX ADMIN — OLANZAPINE 15 MG: 7.5 TABLET ORAL at 21:08

## 2020-01-01 RX ADMIN — METOPROLOL TARTRATE 12.5 MG: 25 TABLET ORAL at 20:45

## 2020-01-01 RX ADMIN — INSULIN LISPRO 2 UNITS: 100 INJECTION, SOLUTION INTRAVENOUS; SUBCUTANEOUS at 20:24

## 2020-01-01 RX ADMIN — DIVALPROEX SODIUM 500 MG: 125 CAPSULE, COATED PELLETS ORAL at 13:04

## 2020-01-01 RX ADMIN — METOPROLOL TARTRATE 12.5 MG: 25 TABLET ORAL at 10:11

## 2020-01-01 RX ADMIN — INSULIN LISPRO 3 UNITS: 100 INJECTION, SOLUTION INTRAVENOUS; SUBCUTANEOUS at 10:47

## 2020-01-01 RX ADMIN — DEXTROMETHORPHAN HYDROBROMIDE AND QUINIDINE SULFATE 1 CAPSULE: 20; 10 CAPSULE, GELATIN COATED ORAL at 21:17

## 2020-01-01 RX ADMIN — METOPROLOL TARTRATE 12.5 MG: 25 TABLET ORAL at 08:15

## 2020-01-01 RX ADMIN — DIVALPROEX SODIUM 500 MG: 125 CAPSULE, COATED PELLETS ORAL at 16:50

## 2020-01-01 RX ADMIN — Medication 5 MG: at 18:34

## 2020-01-01 RX ADMIN — METOPROLOL TARTRATE 12.5 MG: 25 TABLET ORAL at 10:48

## 2020-01-01 RX ADMIN — OLANZAPINE 10 MG: 10 TABLET, FILM COATED ORAL at 20:11

## 2020-01-01 RX ADMIN — INSULIN GLARGINE 10 UNITS: 100 INJECTION, SOLUTION SUBCUTANEOUS at 08:26

## 2020-01-01 RX ADMIN — INSULIN GLARGINE 10 UNITS: 100 INJECTION, SOLUTION SUBCUTANEOUS at 13:13

## 2020-01-01 RX ADMIN — DEXTROMETHORPHAN HYDROBROMIDE AND QUINIDINE SULFATE 1 CAPSULE: 20; 10 CAPSULE, GELATIN COATED ORAL at 08:23

## 2020-01-01 RX ADMIN — DEXTROMETHORPHAN HYDROBROMIDE AND QUINIDINE SULFATE 1 CAPSULE: 20; 10 CAPSULE, GELATIN COATED ORAL at 08:07

## 2020-01-01 RX ADMIN — DIVALPROEX SODIUM 500 MG: 125 CAPSULE, COATED PELLETS ORAL at 10:42

## 2020-01-01 RX ADMIN — OLANZAPINE 10 MG: 10 TABLET, FILM COATED ORAL at 17:20

## 2020-01-01 RX ADMIN — Medication 5 MG: at 17:24

## 2020-01-01 RX ADMIN — LORAZEPAM 1 MG: 1 TABLET ORAL at 17:22

## 2020-01-01 RX ADMIN — DIVALPROEX SODIUM 500 MG: 125 CAPSULE, COATED PELLETS ORAL at 08:08

## 2020-01-01 RX ADMIN — DIVALPROEX SODIUM 500 MG: 125 CAPSULE, COATED PELLETS ORAL at 16:51

## 2020-01-01 RX ADMIN — METOPROLOL TARTRATE 12.5 MG: 25 TABLET ORAL at 20:33

## 2020-01-01 RX ADMIN — OLANZAPINE 20 MG: 7.5 TABLET ORAL at 20:16

## 2020-01-01 RX ADMIN — DEXTROMETHORPHAN HYDROBROMIDE AND QUINIDINE SULFATE 1 CAPSULE: 20; 10 CAPSULE, GELATIN COATED ORAL at 10:07

## 2020-01-01 RX ADMIN — DIVALPROEX SODIUM 500 MG: 125 CAPSULE, COATED PELLETS ORAL at 17:24

## 2020-01-01 RX ADMIN — INSULIN GLARGINE 10 UNITS: 100 INJECTION, SOLUTION SUBCUTANEOUS at 08:15

## 2020-01-01 RX ADMIN — DEXTROMETHORPHAN HYDROBROMIDE AND QUINIDINE SULFATE 1 CAPSULE: 20; 10 CAPSULE, GELATIN COATED ORAL at 21:07

## 2020-01-01 RX ADMIN — AMLODIPINE BESYLATE 5 MG: 5 TABLET ORAL at 13:17

## 2020-01-01 RX ADMIN — DEXTROMETHORPHAN HYDROBROMIDE AND QUINIDINE SULFATE 1 CAPSULE: 20; 10 CAPSULE, GELATIN COATED ORAL at 08:15

## 2020-01-01 RX ADMIN — DEXTROMETHORPHAN HYDROBROMIDE AND QUINIDINE SULFATE 1 CAPSULE: 20; 10 CAPSULE, GELATIN COATED ORAL at 09:14

## 2020-01-01 RX ADMIN — INSULIN GLARGINE 10 UNITS: 100 INJECTION, SOLUTION SUBCUTANEOUS at 08:23

## 2020-01-01 RX ADMIN — INSULIN LISPRO 1 UNITS: 100 INJECTION, SOLUTION INTRAVENOUS; SUBCUTANEOUS at 20:48

## 2020-01-01 RX ADMIN — INSULIN LISPRO 3 UNITS: 100 INJECTION, SOLUTION INTRAVENOUS; SUBCUTANEOUS at 12:03

## 2020-01-01 RX ADMIN — OLANZAPINE 10 MG: 10 TABLET, FILM COATED ORAL at 17:34

## 2020-01-01 RX ADMIN — OLANZAPINE 20 MG: 7.5 TABLET ORAL at 21:35

## 2020-01-01 RX ADMIN — METOPROLOL TARTRATE 12.5 MG: 25 TABLET ORAL at 21:03

## 2020-01-01 RX ADMIN — METOPROLOL TARTRATE 12.5 MG: 25 TABLET ORAL at 20:24

## 2020-01-01 RX ADMIN — INSULIN LISPRO 2 UNITS: 100 INJECTION, SOLUTION INTRAVENOUS; SUBCUTANEOUS at 21:14

## 2020-01-01 RX ADMIN — HALOPERIDOL 5 MG: 5 TABLET ORAL at 08:21

## 2020-01-01 RX ADMIN — DEXTROMETHORPHAN HYDROBROMIDE AND QUINIDINE SULFATE 1 CAPSULE: 20; 10 CAPSULE, GELATIN COATED ORAL at 08:21

## 2020-01-01 RX ADMIN — METOPROLOL TARTRATE 12.5 MG: 25 TABLET ORAL at 20:03

## 2020-01-01 RX ADMIN — DIVALPROEX SODIUM 500 MG: 125 CAPSULE, COATED PELLETS ORAL at 15:00

## 2020-01-01 RX ADMIN — INSULIN GLARGINE 10 UNITS: 100 INJECTION, SOLUTION SUBCUTANEOUS at 09:20

## 2020-01-01 RX ADMIN — INSULIN LISPRO 1 UNITS: 100 INJECTION, SOLUTION INTRAVENOUS; SUBCUTANEOUS at 13:08

## 2020-01-01 RX ADMIN — LORAZEPAM 1 MG: 1 TABLET ORAL at 17:15

## 2020-01-01 RX ADMIN — AMLODIPINE BESYLATE 5 MG: 5 TABLET ORAL at 10:21

## 2020-01-01 RX ADMIN — DIVALPROEX SODIUM 500 MG: 125 CAPSULE, COATED PELLETS ORAL at 09:01

## 2020-01-01 RX ADMIN — Medication 5 MG: at 18:48

## 2020-01-01 RX ADMIN — INSULIN GLARGINE 10 UNITS: 100 INJECTION, SOLUTION SUBCUTANEOUS at 09:58

## 2020-01-01 RX ADMIN — LORAZEPAM 0.5 MG: 0.5 TABLET ORAL at 10:06

## 2020-01-01 RX ADMIN — DIVALPROEX SODIUM 500 MG: 125 CAPSULE, COATED PELLETS ORAL at 15:26

## 2020-01-01 RX ADMIN — INSULIN LISPRO 3 UNITS: 100 INJECTION, SOLUTION INTRAVENOUS; SUBCUTANEOUS at 08:23

## 2020-01-01 RX ADMIN — HALOPERIDOL 5 MG: 5 TABLET ORAL at 20:48

## 2020-01-01 RX ADMIN — METOPROLOL TARTRATE 12.5 MG: 25 TABLET ORAL at 08:42

## 2020-01-01 RX ADMIN — INSULIN GLARGINE 10 UNITS: 100 INJECTION, SOLUTION SUBCUTANEOUS at 08:04

## 2020-01-01 RX ADMIN — METOPROLOL TARTRATE 12.5 MG: 25 TABLET ORAL at 10:28

## 2020-01-01 RX ADMIN — INSULIN LISPRO 3 UNITS: 100 INJECTION, SOLUTION INTRAVENOUS; SUBCUTANEOUS at 18:34

## 2020-01-01 RX ADMIN — INSULIN GLARGINE 10 UNITS: 100 INJECTION, SOLUTION SUBCUTANEOUS at 09:01

## 2020-01-01 RX ADMIN — DIVALPROEX SODIUM 500 MG: 125 CAPSULE, COATED PELLETS ORAL at 17:16

## 2020-01-01 RX ADMIN — OLANZAPINE 20 MG: 7.5 TABLET ORAL at 21:12

## 2020-01-01 RX ADMIN — DEXTROMETHORPHAN HYDROBROMIDE AND QUINIDINE SULFATE 1 CAPSULE: 20; 10 CAPSULE, GELATIN COATED ORAL at 21:03

## 2020-01-01 RX ADMIN — DIVALPROEX SODIUM 500 MG: 125 CAPSULE, COATED PELLETS ORAL at 10:22

## 2020-01-01 RX ADMIN — DEXTROMETHORPHAN HYDROBROMIDE AND QUINIDINE SULFATE 1 CAPSULE: 20; 10 CAPSULE, GELATIN COATED ORAL at 20:09

## 2020-01-01 RX ADMIN — ACETAMINOPHEN 650 MG: 325 TABLET, FILM COATED ORAL at 16:17

## 2020-01-01 RX ADMIN — DIVALPROEX SODIUM 500 MG: 125 CAPSULE, COATED PELLETS ORAL at 10:47

## 2020-01-01 RX ADMIN — OLANZAPINE 10 MG: 10 TABLET, FILM COATED ORAL at 18:51

## 2020-01-01 RX ADMIN — DIVALPROEX SODIUM 500 MG: 125 CAPSULE, COATED PELLETS ORAL at 08:13

## 2020-01-01 RX ADMIN — DIVALPROEX SODIUM 500 MG: 125 CAPSULE, COATED PELLETS ORAL at 20:58

## 2020-01-01 RX ADMIN — METOPROLOL TARTRATE 12.5 MG: 25 TABLET ORAL at 21:10

## 2020-01-01 RX ADMIN — HALOPERIDOL 5 MG: 5 TABLET ORAL at 13:18

## 2020-01-01 RX ADMIN — INSULIN LISPRO 2 UNITS: 100 INJECTION, SOLUTION INTRAVENOUS; SUBCUTANEOUS at 20:12

## 2020-01-01 RX ADMIN — DEXTROMETHORPHAN HYDROBROMIDE AND QUINIDINE SULFATE 1 CAPSULE: 20; 10 CAPSULE, GELATIN COATED ORAL at 08:28

## 2020-01-01 RX ADMIN — INSULIN LISPRO 3 UNITS: 100 INJECTION, SOLUTION INTRAVENOUS; SUBCUTANEOUS at 10:30

## 2020-01-01 RX ADMIN — DIVALPROEX SODIUM 500 MG: 125 CAPSULE, COATED PELLETS ORAL at 16:29

## 2020-01-01 RX ADMIN — DIVALPROEX SODIUM 500 MG: 125 CAPSULE, COATED PELLETS ORAL at 17:03

## 2020-01-01 RX ADMIN — OLANZAPINE 10 MG: 10 TABLET, FILM COATED ORAL at 10:11

## 2020-01-01 RX ADMIN — DIVALPROEX SODIUM 500 MG: 125 CAPSULE, COATED PELLETS ORAL at 21:06

## 2020-01-01 RX ADMIN — GABAPENTIN 100 MG: 100 CAPSULE ORAL at 17:18

## 2020-01-01 RX ADMIN — DIVALPROEX SODIUM 500 MG: 125 CAPSULE, COATED PELLETS ORAL at 18:23

## 2020-01-01 RX ADMIN — DEXTROMETHORPHAN HYDROBROMIDE AND QUINIDINE SULFATE 1 CAPSULE: 20; 10 CAPSULE, GELATIN COATED ORAL at 20:17

## 2020-01-01 RX ADMIN — OLANZAPINE 10 MG: 10 TABLET, FILM COATED ORAL at 17:15

## 2020-01-01 RX ADMIN — INSULIN LISPRO 3 UNITS: 100 INJECTION, SOLUTION INTRAVENOUS; SUBCUTANEOUS at 20:28

## 2020-01-01 RX ADMIN — DEXTROMETHORPHAN HYDROBROMIDE AND QUINIDINE SULFATE 1 CAPSULE: 20; 10 CAPSULE, GELATIN COATED ORAL at 17:27

## 2020-01-01 RX ADMIN — INSULIN LISPRO 2 UNITS: 100 INJECTION, SOLUTION INTRAVENOUS; SUBCUTANEOUS at 20:53

## 2020-01-01 RX ADMIN — HALOPERIDOL 5 MG: 5 TABLET ORAL at 09:52

## 2020-01-01 RX ADMIN — DEXTROMETHORPHAN HYDROBROMIDE AND QUINIDINE SULFATE 1 CAPSULE: 20; 10 CAPSULE, GELATIN COATED ORAL at 21:12

## 2020-01-01 RX ADMIN — DIVALPROEX SODIUM 500 MG: 125 CAPSULE, COATED PELLETS ORAL at 21:13

## 2020-01-01 RX ADMIN — METOPROLOL TARTRATE 12.5 MG: 25 TABLET ORAL at 20:40

## 2020-01-01 RX ADMIN — OLANZAPINE 10 MG: 10 TABLET, FILM COATED ORAL at 17:17

## 2020-01-01 RX ADMIN — DIVALPROEX SODIUM 500 MG: 125 CAPSULE, COATED PELLETS ORAL at 16:34

## 2020-01-01 RX ADMIN — INSULIN GLARGINE 10 UNITS: 100 INJECTION, SOLUTION SUBCUTANEOUS at 09:46

## 2020-01-01 RX ADMIN — DIVALPROEX SODIUM 500 MG: 125 CAPSULE, COATED PELLETS ORAL at 16:11

## 2020-01-01 RX ADMIN — DEXTROMETHORPHAN HYDROBROMIDE AND QUINIDINE SULFATE 1 CAPSULE: 20; 10 CAPSULE, GELATIN COATED ORAL at 15:01

## 2020-01-01 RX ADMIN — INSULIN GLARGINE 10 UNITS: 100 INJECTION, SOLUTION SUBCUTANEOUS at 09:52

## 2020-01-01 RX ADMIN — OLANZAPINE 20 MG: 7.5 TABLET ORAL at 20:11

## 2020-01-01 RX ADMIN — INSULIN LISPRO 2 UNITS: 100 INJECTION, SOLUTION INTRAVENOUS; SUBCUTANEOUS at 20:14

## 2020-01-01 RX ADMIN — GABAPENTIN 100 MG: 100 CAPSULE ORAL at 20:35

## 2020-01-01 RX ADMIN — HALOPERIDOL 5 MG: 5 TABLET ORAL at 20:17

## 2020-01-01 RX ADMIN — DIVALPROEX SODIUM 500 MG: 125 CAPSULE, COATED PELLETS ORAL at 09:14

## 2020-01-01 RX ADMIN — DEXTROMETHORPHAN HYDROBROMIDE AND QUINIDINE SULFATE 1 CAPSULE: 20; 10 CAPSULE, GELATIN COATED ORAL at 08:24

## 2020-01-01 RX ADMIN — LORAZEPAM 1 MG: 1 TABLET ORAL at 15:26

## 2020-01-01 RX ADMIN — INSULIN GLARGINE 10 UNITS: 100 INJECTION, SOLUTION SUBCUTANEOUS at 09:06

## 2020-01-01 RX ADMIN — LORAZEPAM 1 MG: 1 TABLET ORAL at 18:36

## 2020-01-01 RX ADMIN — OLANZAPINE 10 MG: 10 INJECTION, POWDER, FOR SOLUTION INTRAMUSCULAR at 23:00

## 2020-01-01 RX ADMIN — LORAZEPAM 0.5 MG: 0.5 TABLET ORAL at 09:18

## 2020-01-01 RX ADMIN — OLANZAPINE 20 MG: 7.5 TABLET ORAL at 20:48

## 2020-01-01 RX ADMIN — DIVALPROEX SODIUM 500 MG: 125 CAPSULE, COATED PELLETS ORAL at 21:07

## 2020-01-01 RX ADMIN — INSULIN LISPRO 1 UNITS: 100 INJECTION, SOLUTION INTRAVENOUS; SUBCUTANEOUS at 17:37

## 2020-01-01 RX ADMIN — OLANZAPINE 15 MG: 7.5 TABLET ORAL at 12:34

## 2020-01-01 RX ADMIN — OLANZAPINE 20 MG: 7.5 TABLET ORAL at 21:10

## 2020-01-01 RX ADMIN — INSULIN LISPRO 2 UNITS: 100 INJECTION, SOLUTION INTRAVENOUS; SUBCUTANEOUS at 21:06

## 2020-01-01 RX ADMIN — INSULIN LISPRO 3 UNITS: 100 INJECTION, SOLUTION INTRAVENOUS; SUBCUTANEOUS at 12:24

## 2020-01-01 RX ADMIN — Medication 5 MG: at 18:10

## 2020-01-01 RX ADMIN — METOPROLOL TARTRATE 12.5 MG: 25 TABLET ORAL at 08:30

## 2020-01-01 RX ADMIN — INSULIN GLARGINE 10 UNITS: 100 INJECTION, SOLUTION SUBCUTANEOUS at 10:30

## 2020-01-01 RX ADMIN — HALOPERIDOL 5 MG: 5 TABLET ORAL at 12:23

## 2020-01-01 RX ADMIN — INSULIN LISPRO 1 UNITS: 100 INJECTION, SOLUTION INTRAVENOUS; SUBCUTANEOUS at 12:27

## 2020-01-01 RX ADMIN — LORAZEPAM 1 MG: 1 TABLET ORAL at 20:35

## 2020-01-01 RX ADMIN — HALOPERIDOL 5 MG: 5 TABLET ORAL at 10:43

## 2020-01-01 RX ADMIN — DIVALPROEX SODIUM 500 MG: 125 CAPSULE, COATED PELLETS ORAL at 20:09

## 2020-01-01 RX ADMIN — INSULIN LISPRO 2 UNITS: 100 INJECTION, SOLUTION INTRAVENOUS; SUBCUTANEOUS at 12:34

## 2020-01-01 RX ADMIN — INSULIN LISPRO 1 UNITS: 100 INJECTION, SOLUTION INTRAVENOUS; SUBCUTANEOUS at 21:02

## 2020-01-01 RX ADMIN — DIVALPROEX SODIUM 500 MG: 125 CAPSULE, COATED PELLETS ORAL at 18:36

## 2020-01-01 RX ADMIN — DIVALPROEX SODIUM 500 MG: 125 CAPSULE, COATED PELLETS ORAL at 18:03

## 2020-01-01 RX ADMIN — INSULIN LISPRO 3 UNITS: 100 INJECTION, SOLUTION INTRAVENOUS; SUBCUTANEOUS at 12:37

## 2020-01-01 RX ADMIN — DIVALPROEX SODIUM 500 MG: 125 CAPSULE, COATED PELLETS ORAL at 08:15

## 2020-01-01 RX ADMIN — METOPROLOL TARTRATE 12.5 MG: 25 TABLET ORAL at 08:43

## 2020-01-01 RX ADMIN — HALOPERIDOL 5 MG: 5 TABLET ORAL at 20:57

## 2020-01-01 RX ADMIN — OLANZAPINE 10 MG: 10 TABLET, FILM COATED ORAL at 18:03

## 2020-01-01 RX ADMIN — METOPROLOL TARTRATE 12.5 MG: 25 TABLET ORAL at 20:51

## 2020-01-01 RX ADMIN — DIVALPROEX SODIUM 500 MG: 125 CAPSULE, COATED PELLETS ORAL at 10:41

## 2020-01-01 RX ADMIN — OLANZAPINE 10 MG: 10 TABLET, FILM COATED ORAL at 08:12

## 2020-01-01 RX ADMIN — DIVALPROEX SODIUM 500 MG: 125 CAPSULE, COATED PELLETS ORAL at 21:35

## 2020-01-01 RX ADMIN — OLANZAPINE 20 MG: 7.5 TABLET ORAL at 20:02

## 2020-01-01 RX ADMIN — Medication 3 MG: at 17:20

## 2020-01-01 RX ADMIN — INSULIN LISPRO 3 UNITS: 100 INJECTION, SOLUTION INTRAVENOUS; SUBCUTANEOUS at 21:06

## 2020-01-01 RX ADMIN — METOPROLOL TARTRATE 12.5 MG: 25 TABLET ORAL at 10:38

## 2020-01-01 RX ADMIN — DEXTROMETHORPHAN HYDROBROMIDE AND QUINIDINE SULFATE 1 CAPSULE: 20; 10 CAPSULE, GELATIN COATED ORAL at 20:39

## 2020-01-01 RX ADMIN — Medication 5 MG: at 17:34

## 2020-01-01 RX ADMIN — LORAZEPAM 1 MG: 1 TABLET ORAL at 18:52

## 2020-01-01 RX ADMIN — LORAZEPAM 0.5 MG: 0.5 TABLET ORAL at 22:06

## 2020-01-01 RX ADMIN — LORAZEPAM 1 MG: 1 TABLET ORAL at 20:04

## 2020-01-01 RX ADMIN — INSULIN GLARGINE 10 UNITS: 100 INJECTION, SOLUTION SUBCUTANEOUS at 08:42

## 2020-01-01 RX ADMIN — DIVALPROEX SODIUM 500 MG: 125 CAPSULE, COATED PELLETS ORAL at 08:35

## 2020-01-01 RX ADMIN — DIVALPROEX SODIUM 500 MG: 125 CAPSULE, COATED PELLETS ORAL at 20:12

## 2020-01-01 RX ADMIN — INSULIN LISPRO 3 UNITS: 100 INJECTION, SOLUTION INTRAVENOUS; SUBCUTANEOUS at 20:26

## 2020-01-01 RX ADMIN — INSULIN LISPRO 2 UNITS: 100 INJECTION, SOLUTION INTRAVENOUS; SUBCUTANEOUS at 21:07

## 2020-01-01 RX ADMIN — OLANZAPINE 10 MG: 10 TABLET, FILM COATED ORAL at 17:27

## 2020-01-01 RX ADMIN — OLANZAPINE 15 MG: 7.5 TABLET ORAL at 21:05

## 2020-01-01 RX ADMIN — INSULIN LISPRO 3 UNITS: 100 INJECTION, SOLUTION INTRAVENOUS; SUBCUTANEOUS at 12:25

## 2020-01-01 RX ADMIN — GABAPENTIN 100 MG: 100 CAPSULE ORAL at 10:04

## 2020-01-01 RX ADMIN — DIVALPROEX SODIUM 500 MG: 125 CAPSULE, COATED PELLETS ORAL at 20:39

## 2020-01-01 RX ADMIN — INSULIN LISPRO 3 UNITS: 100 INJECTION, SOLUTION INTRAVENOUS; SUBCUTANEOUS at 11:59

## 2020-01-01 RX ADMIN — ACETAMINOPHEN 650 MG: 325 TABLET, FILM COATED ORAL at 20:46

## 2020-01-01 RX ADMIN — HALOPERIDOL 5 MG: 5 TABLET ORAL at 13:13

## 2020-01-01 RX ADMIN — LORAZEPAM 1 MG: 1 TABLET ORAL at 15:01

## 2020-01-01 RX ADMIN — INSULIN LISPRO 3 UNITS: 100 INJECTION, SOLUTION INTRAVENOUS; SUBCUTANEOUS at 20:45

## 2020-01-01 RX ADMIN — OLANZAPINE 20 MG: 7.5 TABLET ORAL at 22:26

## 2020-01-01 RX ADMIN — INSULIN LISPRO 3 UNITS: 100 INJECTION, SOLUTION INTRAVENOUS; SUBCUTANEOUS at 12:47

## 2020-01-01 RX ADMIN — DIVALPROEX SODIUM 500 MG: 125 CAPSULE, COATED PELLETS ORAL at 20:52

## 2020-01-01 RX ADMIN — INSULIN LISPRO 1 UNITS: 100 INJECTION, SOLUTION INTRAVENOUS; SUBCUTANEOUS at 14:16

## 2020-01-01 RX ADMIN — HALOPERIDOL 5 MG: 5 TABLET ORAL at 21:07

## 2020-01-01 RX ADMIN — HALOPERIDOL 5 MG: 5 TABLET ORAL at 10:48

## 2020-01-01 RX ADMIN — LORAZEPAM 0.5 MG: 0.5 TABLET ORAL at 08:42

## 2020-01-01 RX ADMIN — DEXTROMETHORPHAN HYDROBROMIDE AND QUINIDINE SULFATE 1 CAPSULE: 20; 10 CAPSULE, GELATIN COATED ORAL at 10:10

## 2020-01-01 RX ADMIN — DIVALPROEX SODIUM 500 MG: 125 CAPSULE, COATED PELLETS ORAL at 16:45

## 2020-01-01 RX ADMIN — INSULIN LISPRO 3 UNITS: 100 INJECTION, SOLUTION INTRAVENOUS; SUBCUTANEOUS at 08:43

## 2020-01-01 RX ADMIN — LORAZEPAM 1 MG: 1 TABLET ORAL at 21:07

## 2020-01-01 RX ADMIN — LORAZEPAM 1 MG: 1 TABLET ORAL at 21:03

## 2020-01-01 RX ADMIN — INSULIN GLARGINE 10 UNITS: 100 INJECTION, SOLUTION SUBCUTANEOUS at 08:22

## 2020-01-01 RX ADMIN — OLANZAPINE 10 MG: 10 TABLET, FILM COATED ORAL at 18:10

## 2020-01-01 RX ADMIN — DEXTROMETHORPHAN HYDROBROMIDE AND QUINIDINE SULFATE 1 CAPSULE: 20; 10 CAPSULE, GELATIN COATED ORAL at 08:36

## 2020-01-01 RX ADMIN — INSULIN LISPRO 8 UNITS: 100 INJECTION, SOLUTION INTRAVENOUS; SUBCUTANEOUS at 12:50

## 2020-01-01 RX ADMIN — OLANZAPINE 10 MG: 10 TABLET, FILM COATED ORAL at 17:29

## 2020-01-01 RX ADMIN — GABAPENTIN 100 MG: 100 CAPSULE ORAL at 20:41

## 2020-01-01 RX ADMIN — LORAZEPAM 1 MG: 1 TABLET ORAL at 17:26

## 2020-01-01 RX ADMIN — INSULIN LISPRO 4 UNITS: 100 INJECTION, SOLUTION INTRAVENOUS; SUBCUTANEOUS at 20:28

## 2020-01-01 RX ADMIN — LORAZEPAM 1 MG: 1 TABLET ORAL at 17:34

## 2020-01-01 RX ADMIN — DIVALPROEX SODIUM 500 MG: 125 CAPSULE, COATED PELLETS ORAL at 17:22

## 2020-01-01 RX ADMIN — METOPROLOL TARTRATE 12.5 MG: 25 TABLET ORAL at 13:17

## 2020-01-01 RX ADMIN — INSULIN LISPRO 3 UNITS: 100 INJECTION, SOLUTION INTRAVENOUS; SUBCUTANEOUS at 11:05

## 2020-01-01 RX ADMIN — METOPROLOL TARTRATE 12.5 MG: 25 TABLET ORAL at 15:27

## 2020-01-01 RX ADMIN — METOPROLOL TARTRATE 12.5 MG: 25 TABLET ORAL at 21:18

## 2020-01-01 RX ADMIN — OLANZAPINE 10 MG: 10 TABLET, FILM COATED ORAL at 17:40

## 2020-01-01 RX ADMIN — INSULIN GLARGINE 10 UNITS: 100 INJECTION, SOLUTION SUBCUTANEOUS at 09:21

## 2020-01-01 RX ADMIN — DIVALPROEX SODIUM 500 MG: 125 CAPSULE, COATED PELLETS ORAL at 17:34

## 2020-01-01 RX ADMIN — DEXTROMETHORPHAN HYDROBROMIDE AND QUINIDINE SULFATE 1 CAPSULE: 20; 10 CAPSULE, GELATIN COATED ORAL at 10:40

## 2020-01-01 RX ADMIN — HALOPERIDOL 5 MG: 5 TABLET ORAL at 21:12

## 2020-01-01 RX ADMIN — OLANZAPINE 20 MG: 7.5 TABLET ORAL at 20:07

## 2020-01-01 RX ADMIN — HALOPERIDOL 5 MG: 5 TABLET ORAL at 15:27

## 2020-01-01 RX ADMIN — INSULIN LISPRO 5 UNITS: 100 INJECTION, SOLUTION INTRAVENOUS; SUBCUTANEOUS at 18:45

## 2020-01-01 RX ADMIN — AMLODIPINE BESYLATE 5 MG: 5 TABLET ORAL at 08:36

## 2020-01-01 RX ADMIN — INSULIN LISPRO 1 UNITS: 100 INJECTION, SOLUTION INTRAVENOUS; SUBCUTANEOUS at 22:24

## 2020-01-01 RX ADMIN — INSULIN LISPRO 3 UNITS: 100 INJECTION, SOLUTION INTRAVENOUS; SUBCUTANEOUS at 10:43

## 2020-01-01 RX ADMIN — METOPROLOL TARTRATE 12.5 MG: 25 TABLET ORAL at 20:36

## 2020-01-01 RX ADMIN — LORAZEPAM 1 MG: 1 TABLET ORAL at 16:54

## 2020-01-01 RX ADMIN — INSULIN LISPRO 3 UNITS: 100 INJECTION, SOLUTION INTRAVENOUS; SUBCUTANEOUS at 08:57

## 2020-01-01 RX ADMIN — METOPROLOL TARTRATE 12.5 MG: 25 TABLET ORAL at 16:00

## 2020-01-01 RX ADMIN — INSULIN LISPRO 3 UNITS: 100 INJECTION, SOLUTION INTRAVENOUS; SUBCUTANEOUS at 21:09

## 2020-01-01 RX ADMIN — METOPROLOL TARTRATE 12.5 MG: 25 TABLET ORAL at 08:21

## 2020-01-01 RX ADMIN — DEXTROMETHORPHAN HYDROBROMIDE AND QUINIDINE SULFATE 1 CAPSULE: 20; 10 CAPSULE, GELATIN COATED ORAL at 12:22

## 2020-01-01 RX ADMIN — DIVALPROEX SODIUM 500 MG: 125 CAPSULE, COATED PELLETS ORAL at 17:05

## 2020-01-01 RX ADMIN — DEXTROMETHORPHAN HYDROBROMIDE AND QUINIDINE SULFATE 1 CAPSULE: 20; 10 CAPSULE, GELATIN COATED ORAL at 20:14

## 2020-01-01 RX ADMIN — LORAZEPAM 1 MG: 1 TABLET ORAL at 20:09

## 2020-01-01 RX ADMIN — METOPROLOL TARTRATE 12.5 MG: 25 TABLET ORAL at 09:16

## 2020-01-01 RX ADMIN — INSULIN LISPRO 2 UNITS: 100 INJECTION, SOLUTION INTRAVENOUS; SUBCUTANEOUS at 20:51

## 2020-01-01 RX ADMIN — DIVALPROEX SODIUM 500 MG: 125 CAPSULE, COATED PELLETS ORAL at 20:44

## 2020-01-01 RX ADMIN — DIVALPROEX SODIUM 500 MG: 125 CAPSULE, COATED PELLETS ORAL at 08:41

## 2020-01-01 RX ADMIN — Medication 5 MG: at 18:53

## 2020-01-01 RX ADMIN — OLANZAPINE 10 MG: 10 TABLET, FILM COATED ORAL at 17:47

## 2020-01-01 RX ADMIN — DEXTROMETHORPHAN HYDROBROMIDE AND QUINIDINE SULFATE 1 CAPSULE: 20; 10 CAPSULE, GELATIN COATED ORAL at 08:39

## 2020-01-01 RX ADMIN — GABAPENTIN 100 MG: 100 CAPSULE ORAL at 17:03

## 2020-01-01 RX ADMIN — DIVALPROEX SODIUM 500 MG: 125 CAPSULE, COATED PELLETS ORAL at 15:43

## 2020-01-01 RX ADMIN — DEXTROMETHORPHAN HYDROBROMIDE AND QUINIDINE SULFATE 1 CAPSULE: 20; 10 CAPSULE, GELATIN COATED ORAL at 10:51

## 2020-01-01 RX ADMIN — OLANZAPINE 20 MG: 7.5 TABLET ORAL at 20:14

## 2020-01-01 RX ADMIN — INSULIN LISPRO 3 UNITS: 100 INJECTION, SOLUTION INTRAVENOUS; SUBCUTANEOUS at 08:07

## 2020-01-01 RX ADMIN — INSULIN LISPRO 3 UNITS: 100 INJECTION, SOLUTION INTRAVENOUS; SUBCUTANEOUS at 09:21

## 2020-01-01 RX ADMIN — INSULIN LISPRO 3 UNITS: 100 INJECTION, SOLUTION INTRAVENOUS; SUBCUTANEOUS at 13:00

## 2020-01-01 RX ADMIN — INSULIN GLARGINE 15 UNITS: 100 INJECTION, SOLUTION SUBCUTANEOUS at 09:22

## 2020-01-01 RX ADMIN — OLANZAPINE 15 MG: 7.5 TABLET ORAL at 20:35

## 2020-01-01 RX ADMIN — INSULIN LISPRO 3 UNITS: 100 INJECTION, SOLUTION INTRAVENOUS; SUBCUTANEOUS at 12:33

## 2020-01-01 RX ADMIN — OLANZAPINE 10 MG: 10 TABLET, FILM COATED ORAL at 20:13

## 2020-01-01 RX ADMIN — Medication 3 MG: at 19:08

## 2020-01-01 RX ADMIN — Medication 5 MG: at 18:03

## 2020-01-01 RX ADMIN — DIVALPROEX SODIUM 500 MG: 125 CAPSULE, COATED PELLETS ORAL at 17:15

## 2020-01-01 RX ADMIN — LORAZEPAM 1 MG: 1 TABLET ORAL at 08:36

## 2020-01-01 RX ADMIN — INSULIN GLARGINE 10 UNITS: 100 INJECTION, SOLUTION SUBCUTANEOUS at 10:36

## 2020-01-01 RX ADMIN — INSULIN LISPRO 3 UNITS: 100 INJECTION, SOLUTION INTRAVENOUS; SUBCUTANEOUS at 20:11

## 2020-01-01 RX ADMIN — Medication 3 MG: at 17:24

## 2020-01-01 RX ADMIN — HALOPERIDOL 5 MG: 5 TABLET ORAL at 20:50

## 2020-01-01 RX ADMIN — INSULIN GLARGINE 10 UNITS: 100 INJECTION, SOLUTION SUBCUTANEOUS at 12:36

## 2020-01-01 RX ADMIN — INSULIN LISPRO 4 UNITS: 100 INJECTION, SOLUTION INTRAVENOUS; SUBCUTANEOUS at 12:22

## 2020-01-01 RX ADMIN — INSULIN LISPRO 3 UNITS: 100 INJECTION, SOLUTION INTRAVENOUS; SUBCUTANEOUS at 08:34

## 2020-01-01 RX ADMIN — DIVALPROEX SODIUM 500 MG: 125 CAPSULE, COATED PELLETS ORAL at 17:30

## 2020-01-01 RX ADMIN — HALOPERIDOL 5 MG: 5 TABLET ORAL at 20:36

## 2020-01-01 RX ADMIN — DIVALPROEX SODIUM 500 MG: 125 CAPSULE, COATED PELLETS ORAL at 17:27

## 2020-01-01 RX ADMIN — ACETAMINOPHEN 650 MG: 325 TABLET, FILM COATED ORAL at 20:11

## 2020-01-01 RX ADMIN — HALOPERIDOL 5 MG: 5 TABLET ORAL at 10:42

## 2020-01-01 RX ADMIN — INSULIN GLARGINE 10 UNITS: 100 INJECTION, SOLUTION SUBCUTANEOUS at 09:27

## 2020-01-01 RX ADMIN — LORAZEPAM 0.5 MG: 0.5 TABLET ORAL at 08:20

## 2020-01-01 RX ADMIN — INSULIN GLARGINE 10 UNITS: 100 INJECTION, SOLUTION SUBCUTANEOUS at 10:38

## 2020-01-01 RX ADMIN — DIVALPROEX SODIUM 500 MG: 125 CAPSULE, COATED PELLETS ORAL at 16:01

## 2020-01-01 RX ADMIN — DEXTROMETHORPHAN HYDROBROMIDE AND QUINIDINE SULFATE 1 CAPSULE: 20; 10 CAPSULE, GELATIN COATED ORAL at 08:19

## 2020-01-01 RX ADMIN — METOPROLOL TARTRATE 12.5 MG: 25 TABLET ORAL at 10:42

## 2020-01-01 RX ADMIN — DIVALPROEX SODIUM 500 MG: 125 CAPSULE, COATED PELLETS ORAL at 18:10

## 2020-01-01 RX ADMIN — AMLODIPINE BESYLATE 5 MG: 5 TABLET ORAL at 10:43

## 2020-01-01 RX ADMIN — INSULIN LISPRO 3 UNITS: 100 INJECTION, SOLUTION INTRAVENOUS; SUBCUTANEOUS at 20:12

## 2020-01-01 RX ADMIN — Medication 5 MG: at 17:14

## 2020-01-01 RX ADMIN — OLANZAPINE 10 MG: 10 TABLET, FILM COATED ORAL at 17:03

## 2020-01-01 RX ADMIN — HALOPERIDOL 5 MG: 5 TABLET ORAL at 20:35

## 2020-01-01 RX ADMIN — DIVALPROEX SODIUM 500 MG: 125 CAPSULE, COATED PELLETS ORAL at 08:36

## 2020-01-01 RX ADMIN — OLANZAPINE 10 MG: 10 TABLET, FILM COATED ORAL at 18:30

## 2020-01-01 RX ADMIN — LORAZEPAM 1 MG: 1 TABLET ORAL at 18:10

## 2020-01-01 RX ADMIN — INSULIN LISPRO 3 UNITS: 100 INJECTION, SOLUTION INTRAVENOUS; SUBCUTANEOUS at 21:42

## 2020-01-01 RX ADMIN — INSULIN LISPRO 2 UNITS: 100 INJECTION, SOLUTION INTRAVENOUS; SUBCUTANEOUS at 21:56

## 2020-01-01 RX ADMIN — INSULIN LISPRO 3 UNITS: 100 INJECTION, SOLUTION INTRAVENOUS; SUBCUTANEOUS at 10:36

## 2020-01-01 RX ADMIN — DEXTROMETHORPHAN HYDROBROMIDE AND QUINIDINE SULFATE 1 CAPSULE: 20; 10 CAPSULE, GELATIN COATED ORAL at 20:40

## 2020-01-01 RX ADMIN — DIVALPROEX SODIUM 500 MG: 125 CAPSULE, COATED PELLETS ORAL at 16:21

## 2020-01-01 RX ADMIN — METOPROLOL TARTRATE 12.5 MG: 25 TABLET ORAL at 20:52

## 2020-01-01 RX ADMIN — DEXTROMETHORPHAN HYDROBROMIDE AND QUINIDINE SULFATE 1 CAPSULE: 20; 10 CAPSULE, GELATIN COATED ORAL at 08:13

## 2020-01-01 RX ADMIN — METOPROLOL TARTRATE 12.5 MG: 25 TABLET ORAL at 22:00

## 2020-01-01 RX ADMIN — DEXTROMETHORPHAN HYDROBROMIDE AND QUINIDINE SULFATE 1 CAPSULE: 20; 10 CAPSULE, GELATIN COATED ORAL at 10:24

## 2020-01-01 RX ADMIN — INSULIN LISPRO 2 UNITS: 100 INJECTION, SOLUTION INTRAVENOUS; SUBCUTANEOUS at 12:42

## 2020-01-01 RX ADMIN — INSULIN GLARGINE 10 UNITS: 100 INJECTION, SOLUTION SUBCUTANEOUS at 09:16

## 2020-01-01 RX ADMIN — Medication 5 MG: at 18:21

## 2020-01-01 RX ADMIN — LORAZEPAM 0.5 MG: 0.5 TABLET ORAL at 11:55

## 2020-01-01 RX ADMIN — DIVALPROEX SODIUM 500 MG: 125 CAPSULE, COATED PELLETS ORAL at 09:15

## 2020-01-01 RX ADMIN — DIVALPROEX SODIUM 500 MG: 125 CAPSULE, COATED PELLETS ORAL at 18:57

## 2020-01-01 RX ADMIN — HALOPERIDOL 5 MG: 5 TABLET ORAL at 20:37

## 2020-01-01 RX ADMIN — HALOPERIDOL 5 MG: 5 TABLET ORAL at 10:11

## 2020-01-01 RX ADMIN — DIVALPROEX SODIUM 500 MG: 125 CAPSULE, COATED PELLETS ORAL at 08:33

## 2020-01-01 RX ADMIN — DIVALPROEX SODIUM 500 MG: 125 CAPSULE, COATED PELLETS ORAL at 20:28

## 2020-01-01 RX ADMIN — DIVALPROEX SODIUM 500 MG: 125 CAPSULE, COATED PELLETS ORAL at 10:28

## 2020-01-01 RX ADMIN — DEXTROMETHORPHAN HYDROBROMIDE AND QUINIDINE SULFATE 1 CAPSULE: 20; 10 CAPSULE, GELATIN COATED ORAL at 16:43

## 2020-01-01 RX ADMIN — INSULIN GLARGINE 10 UNITS: 100 INJECTION, SOLUTION SUBCUTANEOUS at 08:11

## 2020-01-01 RX ADMIN — OLANZAPINE 20 MG: 7.5 TABLET ORAL at 21:03

## 2020-01-01 RX ADMIN — Medication 5 MG: at 17:38

## 2020-01-01 RX ADMIN — INSULIN LISPRO 2 UNITS: 100 INJECTION, SOLUTION INTRAVENOUS; SUBCUTANEOUS at 12:36

## 2020-01-01 RX ADMIN — INSULIN LISPRO 3 UNITS: 100 INJECTION, SOLUTION INTRAVENOUS; SUBCUTANEOUS at 10:58

## 2020-01-01 RX ADMIN — INSULIN GLARGINE 10 UNITS: 100 INJECTION, SOLUTION SUBCUTANEOUS at 08:34

## 2020-01-01 RX ADMIN — AMLODIPINE BESYLATE 5 MG: 5 TABLET ORAL at 13:07

## 2020-01-01 RX ADMIN — METOPROLOL TARTRATE 12.5 MG: 25 TABLET ORAL at 08:33

## 2020-01-01 RX ADMIN — INSULIN LISPRO 1 UNITS: 100 INJECTION, SOLUTION INTRAVENOUS; SUBCUTANEOUS at 18:06

## 2020-01-01 RX ADMIN — DEXTROMETHORPHAN HYDROBROMIDE AND QUINIDINE SULFATE 1 CAPSULE: 20; 10 CAPSULE, GELATIN COATED ORAL at 10:05

## 2020-01-01 RX ADMIN — METOPROLOL TARTRATE 12.5 MG: 25 TABLET ORAL at 09:10

## 2020-01-01 RX ADMIN — INSULIN GLARGINE 10 UNITS: 100 INJECTION, SOLUTION SUBCUTANEOUS at 08:36

## 2020-01-01 RX ADMIN — ACETAMINOPHEN 650 MG: 650 SUPPOSITORY RECTAL at 09:43

## 2020-01-01 RX ADMIN — DIVALPROEX SODIUM 500 MG: 125 CAPSULE, COATED PELLETS ORAL at 17:20

## 2020-01-01 RX ADMIN — DIVALPROEX SODIUM 500 MG: 125 CAPSULE, COATED PELLETS ORAL at 09:32

## 2020-01-01 RX ADMIN — DEXTROMETHORPHAN HYDROBROMIDE AND QUINIDINE SULFATE 1 CAPSULE: 20; 10 CAPSULE, GELATIN COATED ORAL at 11:09

## 2020-01-01 RX ADMIN — Medication 5 MG: at 18:36

## 2020-01-01 RX ADMIN — Medication 5 MG: at 17:11

## 2020-01-01 RX ADMIN — DIVALPROEX SODIUM 500 MG: 125 CAPSULE, COATED PELLETS ORAL at 17:54

## 2020-01-01 RX ADMIN — LORAZEPAM 0.5 MG: 0.5 TABLET ORAL at 10:44

## 2020-01-01 RX ADMIN — DIVALPROEX SODIUM 500 MG: 125 CAPSULE, COATED PELLETS ORAL at 20:43

## 2020-01-01 RX ADMIN — INSULIN LISPRO 2 UNITS: 100 INJECTION, SOLUTION INTRAVENOUS; SUBCUTANEOUS at 12:51

## 2020-01-01 RX ADMIN — DIVALPROEX SODIUM 500 MG: 125 CAPSULE, COATED PELLETS ORAL at 21:05

## 2020-01-01 RX ADMIN — INSULIN LISPRO 1 UNITS: 100 INJECTION, SOLUTION INTRAVENOUS; SUBCUTANEOUS at 20:19

## 2020-01-01 RX ADMIN — INSULIN GLARGINE 10 UNITS: 100 INJECTION, SOLUTION SUBCUTANEOUS at 10:53

## 2020-01-01 RX ADMIN — Medication 3 MG: at 17:40

## 2020-01-01 RX ADMIN — INSULIN LISPRO 3 UNITS: 100 INJECTION, SOLUTION INTRAVENOUS; SUBCUTANEOUS at 09:01

## 2020-01-01 RX ADMIN — SODIUM CHLORIDE 750 MG: 900 INJECTION, SOLUTION INTRAVENOUS at 10:16

## 2020-01-01 RX ADMIN — Medication 3 MG: at 19:09

## 2020-01-01 RX ADMIN — DIVALPROEX SODIUM 500 MG: 125 CAPSULE, COATED PELLETS ORAL at 22:26

## 2020-01-01 RX ADMIN — INSULIN LISPRO 3 UNITS: 100 INJECTION, SOLUTION INTRAVENOUS; SUBCUTANEOUS at 15:13

## 2020-01-01 RX ADMIN — DIVALPROEX SODIUM 500 MG: 125 CAPSULE, COATED PELLETS ORAL at 17:32

## 2020-01-01 RX ADMIN — INSULIN LISPRO 1 UNITS: 100 INJECTION, SOLUTION INTRAVENOUS; SUBCUTANEOUS at 12:00

## 2020-01-01 RX ADMIN — DIVALPROEX SODIUM 500 MG: 125 CAPSULE, COATED PELLETS ORAL at 12:35

## 2020-01-01 RX ADMIN — METOPROLOL TARTRATE 12.5 MG: 25 TABLET ORAL at 08:58

## 2020-01-01 RX ADMIN — INSULIN LISPRO 6 UNITS: 100 INJECTION, SOLUTION INTRAVENOUS; SUBCUTANEOUS at 17:03

## 2020-01-01 RX ADMIN — INSULIN LISPRO 3 UNITS: 100 INJECTION, SOLUTION INTRAVENOUS; SUBCUTANEOUS at 12:59

## 2020-01-01 RX ADMIN — INSULIN LISPRO 3 UNITS: 100 INJECTION, SOLUTION INTRAVENOUS; SUBCUTANEOUS at 10:06

## 2020-01-01 RX ADMIN — LORAZEPAM 1 MG: 1 TABLET ORAL at 22:27

## 2020-01-01 RX ADMIN — Medication 3 MG: at 17:08

## 2020-01-01 RX ADMIN — METOPROLOL TARTRATE 12.5 MG: 25 TABLET ORAL at 08:41

## 2020-01-01 RX ADMIN — METOPROLOL TARTRATE 12.5 MG: 25 TABLET ORAL at 10:07

## 2020-01-01 RX ADMIN — AMLODIPINE BESYLATE 5 MG: 5 TABLET ORAL at 15:02

## 2020-01-01 RX ADMIN — INSULIN LISPRO 3 UNITS: 100 INJECTION, SOLUTION INTRAVENOUS; SUBCUTANEOUS at 13:23

## 2020-01-01 RX ADMIN — INSULIN LISPRO 1 UNITS: 100 INJECTION, SOLUTION INTRAVENOUS; SUBCUTANEOUS at 20:13

## 2020-01-01 RX ADMIN — OLANZAPINE 20 MG: 7.5 TABLET ORAL at 20:17

## 2020-01-01 RX ADMIN — LORAZEPAM 1 MG: 1 TABLET ORAL at 17:39

## 2020-01-01 RX ADMIN — INSULIN LISPRO 3 UNITS: 100 INJECTION, SOLUTION INTRAVENOUS; SUBCUTANEOUS at 08:17

## 2020-01-01 RX ADMIN — HALOPERIDOL 5 MG: 5 TABLET ORAL at 20:28

## 2020-01-01 RX ADMIN — LORAZEPAM 0.5 MG: 0.5 TABLET ORAL at 08:46

## 2020-01-01 RX ADMIN — INSULIN GLARGINE 10 UNITS: 100 INJECTION, SOLUTION SUBCUTANEOUS at 12:38

## 2020-01-01 RX ADMIN — LORAZEPAM 1 MG: 1 TABLET ORAL at 20:48

## 2020-01-01 RX ADMIN — DIVALPROEX SODIUM 500 MG: 125 CAPSULE, COATED PELLETS ORAL at 16:05

## 2020-01-01 RX ADMIN — DEXTROMETHORPHAN HYDROBROMIDE AND QUINIDINE SULFATE 1 CAPSULE: 20; 10 CAPSULE, GELATIN COATED ORAL at 10:29

## 2020-01-01 RX ADMIN — MORPHINE SULFATE 4 MG: 2 INJECTION, SOLUTION INTRAMUSCULAR; INTRAVENOUS at 02:34

## 2020-01-01 RX ADMIN — OLANZAPINE 10 MG: 10 TABLET, FILM COATED ORAL at 20:46

## 2020-01-01 RX ADMIN — DEXTROMETHORPHAN HYDROBROMIDE AND QUINIDINE SULFATE 1 CAPSULE: 20; 10 CAPSULE, GELATIN COATED ORAL at 20:04

## 2020-01-01 RX ADMIN — DIVALPROEX SODIUM 500 MG: 125 CAPSULE, COATED PELLETS ORAL at 09:20

## 2020-01-01 RX ADMIN — ACETAMINOPHEN 650 MG: 325 TABLET, FILM COATED ORAL at 21:09

## 2020-01-01 RX ADMIN — INSULIN LISPRO 1 UNITS: 100 INJECTION, SOLUTION INTRAVENOUS; SUBCUTANEOUS at 13:23

## 2020-01-01 RX ADMIN — OLANZAPINE 10 MG: 10 TABLET, FILM COATED ORAL at 18:27

## 2020-01-01 RX ADMIN — DIVALPROEX SODIUM 500 MG: 125 CAPSULE, COATED PELLETS ORAL at 20:02

## 2020-01-01 RX ADMIN — INSULIN LISPRO 3 UNITS: 100 INJECTION, SOLUTION INTRAVENOUS; SUBCUTANEOUS at 12:51

## 2020-01-01 RX ADMIN — METOPROLOL TARTRATE 12.5 MG: 25 TABLET ORAL at 09:22

## 2020-01-01 RX ADMIN — HALOPERIDOL 5 MG: 5 TABLET ORAL at 20:20

## 2020-01-01 RX ADMIN — AMLODIPINE BESYLATE 5 MG: 5 TABLET ORAL at 09:52

## 2020-01-01 RX ADMIN — DIVALPROEX SODIUM 500 MG: 125 CAPSULE, COATED PELLETS ORAL at 09:16

## 2020-01-01 RX ADMIN — LORAZEPAM 1 MG: 1 TABLET ORAL at 08:28

## 2020-01-01 RX ADMIN — DEXTROMETHORPHAN HYDROBROMIDE AND QUINIDINE SULFATE 1 CAPSULE: 20; 10 CAPSULE, GELATIN COATED ORAL at 08:20

## 2020-01-01 RX ADMIN — HALOPERIDOL 5 MG: 5 TABLET ORAL at 20:13

## 2020-01-01 RX ADMIN — INSULIN LISPRO 1 UNITS: 100 INJECTION, SOLUTION INTRAVENOUS; SUBCUTANEOUS at 17:28

## 2020-01-01 RX ADMIN — INSULIN GLARGINE 10 UNITS: 100 INJECTION, SOLUTION SUBCUTANEOUS at 09:32

## 2020-01-01 RX ADMIN — OLANZAPINE 20 MG: 7.5 TABLET ORAL at 21:16

## 2020-01-01 RX ADMIN — INSULIN LISPRO 1 UNITS: 100 INJECTION, SOLUTION INTRAVENOUS; SUBCUTANEOUS at 17:02

## 2020-01-01 RX ADMIN — Medication 3 MG: at 17:19

## 2020-01-01 RX ADMIN — INSULIN LISPRO 3 UNITS: 100 INJECTION, SOLUTION INTRAVENOUS; SUBCUTANEOUS at 08:19

## 2020-01-01 RX ADMIN — INSULIN GLARGINE 15 UNITS: 100 INJECTION, SOLUTION SUBCUTANEOUS at 10:58

## 2020-01-01 RX ADMIN — AMLODIPINE BESYLATE 5 MG: 5 TABLET ORAL at 13:40

## 2020-01-01 RX ADMIN — GABAPENTIN 100 MG: 100 CAPSULE ORAL at 17:31

## 2020-01-01 RX ADMIN — OLANZAPINE 20 MG: 7.5 TABLET ORAL at 20:49

## 2020-01-01 RX ADMIN — INSULIN GLARGINE 10 UNITS: 100 INJECTION, SOLUTION SUBCUTANEOUS at 10:05

## 2020-01-01 RX ADMIN — DIVALPROEX SODIUM 500 MG: 125 CAPSULE, COATED PELLETS ORAL at 10:10

## 2020-01-01 RX ADMIN — INSULIN GLARGINE 10 UNITS: 100 INJECTION, SOLUTION SUBCUTANEOUS at 10:06

## 2020-01-01 RX ADMIN — Medication 3 MG: at 18:43

## 2020-01-01 RX ADMIN — LORAZEPAM 0.5 MG: 0.5 TABLET ORAL at 20:52

## 2020-01-01 RX ADMIN — HALOPERIDOL 5 MG: 5 TABLET ORAL at 21:17

## 2020-01-01 RX ADMIN — OLANZAPINE 10 MG: 10 TABLET, FILM COATED ORAL at 16:28

## 2020-01-01 RX ADMIN — INSULIN LISPRO 3 UNITS: 100 INJECTION, SOLUTION INTRAVENOUS; SUBCUTANEOUS at 21:24

## 2020-01-01 RX ADMIN — LORAZEPAM 1 MG: 1 TABLET ORAL at 09:21

## 2020-01-01 RX ADMIN — LORAZEPAM 0.5 MG: 0.5 TABLET ORAL at 10:41

## 2020-01-12 PROBLEM — I73.9 PAD (PERIPHERAL ARTERY DISEASE) (HCC): Status: ACTIVE | Noted: 2020-01-01

## 2020-02-15 PROBLEM — R35.0 URINARY FREQUENCY: Status: ACTIVE | Noted: 2020-01-01

## 2020-02-28 PROBLEM — A41.9 SEPSIS (HCC): Status: ACTIVE | Noted: 2020-01-01

## 2020-02-29 PROBLEM — E87.0 HYPERNATREMIA: Status: ACTIVE | Noted: 2020-01-01

## 2020-03-02 NOTE — PROGRESS NOTES
Case Management Discharge Note      Final Note: The patient  on 3/1/2020 @ 05:11. KRISTEN Jones Rn, CCP.          Destination      No service has been selected for the patient.      Durable Medical Equipment      No service has been selected for the patient.      Dialysis/Infusion      No service has been selected for the patient.      Home Medical Care      No service has been selected for the patient.      Therapy      No service has been selected for the patient.      Community Resources      No service has been selected for the patient.             Final Discharge Disposition Code: 20 -

## 2020-03-02 NOTE — PROGRESS NOTES
Discharge Planning Assessment  [facility]     Patient Name: Yuriy Bennett  MRN: [id number]  Today's Date: 3/2/2020    Admit Date: 2020    Discharge Needs Assessment    No documentation.       Discharge Plan     Row Name 20 1643       Plan    Plan Comments  The patient tranferred to [facility] from [address] [place] [address] on 2020. The patient was palliative. The patient  on 3/1/2020 @ 05:11. BGasper Jones Rn, CCP.     Final Discharge Disposition Code  20 -     Final Note  The patient  on 3/1/2020 @ 05:11. DUARTE. Bonnie Jones, CCP.         Destination      Coordination has not been started for this encounter.      Durable Medical Equipment      Coordination has not been started for this encounter.      Dialysis/Infusion      Coordination has not been started for this encounter.      Home Medical Care      Coordination has not been started for this encounter.      Therapy      Coordination has not been started for this encounter.      Community Resources      Coordination has not been started for this encounter.        Expected Discharge Date and Time     Expected Discharge Date Expected Discharge Time    Mar 1, 2020         Demographic Summary    No documentation.       Functional Status    No documentation.       Psychosocial    No documentation.       Abuse/Neglect    No documentation.       Legal    No documentation.       Substance Abuse    No documentation.       Patient Forms    No documentation.           Daja Jones RN

## 2020-03-03 NOTE — DISCHARGE SUMMARY
Discharge As      Date of Admisssion:  2020  Date of Death:  3/1/2020  Time of Death:  5:11 AM    Patient Care Team:  Carolina Tran FNP as PCP - General (Family Medicine)    Final Diagnosis:   End Stage Dementia        Presenting Problem/History of Present Illness  Sepsis (CMS/[facility]) [A41.9]    Mr. Bennett is a 79 y.o. male with a history of end stage dementia and urethral stricture/UTI who we followed inpatient and then transferred to the [facility] for psychiatric stabilization after he completed hospital course for UTI. He had stricture and required carlton placement by urology during that stay. Carlton is out. He had continued dementia and poor responsiveness in [facility]. Plan was for discharge to facility and followed by hospice but he was not palliative care. He had pronounced fever and hypotension today. I was colled. Discussed with rapid response team. Tried to call family but no response from eith number listed. Transfer to telemetry, DNR/DNI noted but not comfort care at this time.     Patient minimally responsive. He is protecting airway and pupils are equal. IV access obtained and he is getting initial bolus now.       Hospital Course  Patient was a 79 y.o. male who had a prolonged stay in the [facility] for psychiatric stabilization of end-stage dementia after he had been treated here for acute urinary tract infection/sepsis and urethral stricture.  Prior to possible discharge from the [facility] patient became acutely septic with fever and hypotension.  He was transferred back to telemetry and given supportive care and underwent infectious work-up.  Likely source of his sepsis was pneumonia, aspiration, versus recurrence of urinary tract infection.  While infectious work-up was ongoing he was placed on empiric antibiotic therapy and cultures were obtained.  Chest x-ray did show left lower lobe pneumonia and his pro calcitonin was grossly elevated.  He was also severely hypernatremic.  I discussed the case with  nephrology and his fluids were changed to D5W.  Urology also was consulted in case the patient needed placement of a Sinha because the last urinary catheter he had needed to be placed in the OR.  Despite antibiotic therapy and supportive care the patient continued to decline with additional fevers and no improvement in his mental status.  He began to have tachypnea and had increasing oxygen requirements.  I had additional goals of care discussion with his wife over the phone, Mrs. Bennett, and at that time she elected to pursue comfort and palliative care.  He was transitioned to comfort and palliative goals of care and was transferred to our palliative care floor.  There he continued to rapidly decline and  on 3/1/2020.  I do feel the ultimate cause of his death was end-stage dementia and he was having recurrent infections urinary and now documented respiratory because of his cognitive decline.    Eric Milton MD  20  4:22 PM    Dictated portions using Dragon dictation software.

## 2020-03-04 LAB
BACTERIA SPEC AEROBE CULT: NORMAL
BACTERIA SPEC AEROBE CULT: NORMAL

## 2022-03-11 NOTE — PLAN OF CARE
Problem: Patient Care Overview  Goal: Plan of Care Review  Outcome: Ongoing (interventions implemented as appropriate)      KJ patient via telephone for EGD/CS. Scheduled 04/25/2022 with arrival time 10:30am. Prep packet mailed to verified address on file. Also advised arrival time may change based on [facility] guidelines. KJ Smith--Eileen
